# Patient Record
Sex: FEMALE | Race: WHITE | NOT HISPANIC OR LATINO | Employment: FULL TIME | URBAN - METROPOLITAN AREA
[De-identification: names, ages, dates, MRNs, and addresses within clinical notes are randomized per-mention and may not be internally consistent; named-entity substitution may affect disease eponyms.]

---

## 2017-02-24 ENCOUNTER — TRANSCRIBE ORDERS (OUTPATIENT)
Dept: ADMINISTRATIVE | Facility: HOSPITAL | Age: 22
End: 2017-02-24

## 2017-02-24 DIAGNOSIS — R10.11 ABDOMINAL PAIN, RIGHT UPPER QUADRANT: Primary | ICD-10-CM

## 2017-02-24 DIAGNOSIS — R10.12 ABDOMINAL PAIN, LEFT UPPER QUADRANT: ICD-10-CM

## 2017-02-27 ENCOUNTER — HOSPITAL ENCOUNTER (OUTPATIENT)
Dept: RADIOLOGY | Age: 22
Discharge: HOME | End: 2017-02-27
Payer: COMMERCIAL

## 2017-02-27 DIAGNOSIS — R10.12 ABDOMINAL PAIN, LEFT UPPER QUADRANT: ICD-10-CM

## 2017-02-27 DIAGNOSIS — R10.11 ABDOMINAL PAIN, RIGHT UPPER QUADRANT: ICD-10-CM

## 2017-02-27 PROCEDURE — 76700 US EXAM ABDOM COMPLETE: CPT

## 2017-02-27 PROCEDURE — 74000 HB X-RAY EXAM OF ABDOMEN (SINGLE ANTEROPOSTERIOR VIEW): CPT

## 2017-06-22 ENCOUNTER — TRANSCRIBE ORDERS (OUTPATIENT)
Dept: LAB | Facility: HOSPITAL | Age: 22
End: 2017-06-22

## 2017-06-22 ENCOUNTER — APPOINTMENT (OUTPATIENT)
Dept: LAB | Facility: HOSPITAL | Age: 22
End: 2017-06-22
Attending: INTERNAL MEDICINE
Payer: COMMERCIAL

## 2017-06-22 DIAGNOSIS — R10.12 ABDOMINAL PAIN, LEFT UPPER QUADRANT: ICD-10-CM

## 2017-06-22 DIAGNOSIS — K76.0 FATTY METAMORPHOSIS OF LIVER: ICD-10-CM

## 2017-06-22 DIAGNOSIS — R10.12 ABDOMINAL PAIN, LEFT UPPER QUADRANT: Primary | ICD-10-CM

## 2017-06-22 LAB
ALBUMIN SERPL BCP-MCNC: 3.7 G/DL (ref 3.5–5)
ALP SERPL-CCNC: 69 U/L (ref 46–116)
ALT SERPL W P-5'-P-CCNC: 108 U/L (ref 12–78)
AMYLASE SERPL-CCNC: 38 IU/L (ref 25–115)
ANION GAP SERPL CALCULATED.3IONS-SCNC: 10 MMOL/L (ref 4–13)
AST SERPL W P-5'-P-CCNC: 81 U/L (ref 5–45)
BASOPHILS # BLD AUTO: 0.02 THOUSANDS/ΜL (ref 0–0.1)
BASOPHILS NFR BLD AUTO: 0 % (ref 0–1)
BILIRUB SERPL-MCNC: 0.47 MG/DL (ref 0.2–1)
BUN SERPL-MCNC: 11 MG/DL (ref 5–25)
CALCIUM SERPL-MCNC: 9.2 MG/DL (ref 8.3–10.1)
CHLORIDE SERPL-SCNC: 103 MMOL/L (ref 100–108)
CHOLEST SERPL-MCNC: 214 MG/DL (ref 50–200)
CO2 SERPL-SCNC: 26 MMOL/L (ref 21–32)
CREAT SERPL-MCNC: 0.77 MG/DL (ref 0.6–1.3)
EOSINOPHIL # BLD AUTO: 0.17 THOUSAND/ΜL (ref 0–0.61)
EOSINOPHIL NFR BLD AUTO: 2 % (ref 0–6)
ERYTHROCYTE [DISTWIDTH] IN BLOOD BY AUTOMATED COUNT: 13.2 % (ref 11.6–15.1)
GFR SERPL CREATININE-BSD FRML MDRD: >60 ML/MIN/1.73SQ M
GLUCOSE P FAST SERPL-MCNC: 95 MG/DL (ref 65–99)
HCT VFR BLD AUTO: 39.8 % (ref 34.8–46.1)
HDLC SERPL-MCNC: 33 MG/DL (ref 40–60)
HGB BLD-MCNC: 13.6 G/DL (ref 11.5–15.4)
LDLC SERPL CALC-MCNC: 101 MG/DL (ref 0–100)
LIPASE SERPL-CCNC: 113 U/L (ref 73–393)
LYMPHOCYTES # BLD AUTO: 3.79 THOUSANDS/ΜL (ref 0.6–4.47)
LYMPHOCYTES NFR BLD AUTO: 52 % (ref 14–44)
MCH RBC QN AUTO: 30.8 PG (ref 26.8–34.3)
MCHC RBC AUTO-ENTMCNC: 34.2 G/DL (ref 31.4–37.4)
MCV RBC AUTO: 90 FL (ref 82–98)
MONOCYTES # BLD AUTO: 0.57 THOUSAND/ΜL (ref 0.17–1.22)
MONOCYTES NFR BLD AUTO: 8 % (ref 4–12)
NEUTROPHILS # BLD AUTO: 2.77 THOUSANDS/ΜL (ref 1.85–7.62)
NEUTS SEG NFR BLD AUTO: 38 % (ref 43–75)
NRBC BLD AUTO-RTO: 0 /100 WBCS
PLATELET # BLD AUTO: 290 THOUSANDS/UL (ref 149–390)
PMV BLD AUTO: 9 FL (ref 8.9–12.7)
POTASSIUM SERPL-SCNC: 3.8 MMOL/L (ref 3.5–5.3)
PROT SERPL-MCNC: 7.8 G/DL (ref 6.4–8.2)
RBC # BLD AUTO: 4.42 MILLION/UL (ref 3.81–5.12)
SODIUM SERPL-SCNC: 139 MMOL/L (ref 136–145)
TRIGL SERPL-MCNC: 400 MG/DL
WBC # BLD AUTO: 7.33 THOUSAND/UL (ref 4.31–10.16)

## 2017-06-22 PROCEDURE — 82150 ASSAY OF AMYLASE: CPT

## 2017-06-22 PROCEDURE — 80061 LIPID PANEL: CPT

## 2017-06-22 PROCEDURE — 83690 ASSAY OF LIPASE: CPT

## 2017-06-22 PROCEDURE — 36415 COLL VENOUS BLD VENIPUNCTURE: CPT

## 2017-06-22 PROCEDURE — 80053 COMPREHEN METABOLIC PANEL: CPT

## 2017-06-22 PROCEDURE — 85025 COMPLETE CBC W/AUTO DIFF WBC: CPT

## 2017-10-02 ENCOUNTER — ALLSCRIPTS OFFICE VISIT (OUTPATIENT)
Dept: OTHER | Facility: OTHER | Age: 22
End: 2017-10-02

## 2017-10-11 ENCOUNTER — ALLSCRIPTS OFFICE VISIT (OUTPATIENT)
Dept: OTHER | Facility: OTHER | Age: 22
End: 2017-10-11

## 2017-10-19 ENCOUNTER — GENERIC CONVERSION - ENCOUNTER (OUTPATIENT)
Dept: OTHER | Facility: OTHER | Age: 22
End: 2017-10-19

## 2017-10-30 ENCOUNTER — ALLSCRIPTS OFFICE VISIT (OUTPATIENT)
Dept: OTHER | Facility: OTHER | Age: 22
End: 2017-10-30

## 2017-11-02 ENCOUNTER — GENERIC CONVERSION - ENCOUNTER (OUTPATIENT)
Dept: OTHER | Facility: OTHER | Age: 22
End: 2017-11-02

## 2017-11-09 ENCOUNTER — GENERIC CONVERSION - ENCOUNTER (OUTPATIENT)
Dept: OTHER | Facility: OTHER | Age: 22
End: 2017-11-09

## 2017-11-16 ENCOUNTER — GENERIC CONVERSION - ENCOUNTER (OUTPATIENT)
Dept: OTHER | Facility: OTHER | Age: 22
End: 2017-11-16

## 2017-11-20 ENCOUNTER — GENERIC CONVERSION - ENCOUNTER (OUTPATIENT)
Dept: OTHER | Facility: OTHER | Age: 22
End: 2017-11-20

## 2017-12-07 ENCOUNTER — GENERIC CONVERSION - ENCOUNTER (OUTPATIENT)
Dept: OTHER | Facility: OTHER | Age: 22
End: 2017-12-07

## 2018-01-11 NOTE — PROGRESS NOTES
History of Present Illness  HPI: Fenton is here for f/u  Current wt: 139 7 lbs  Loss of 1 9 lbs x 2 1/2 wks  Tracking and consuming 800-1000 kcal/day  Feels she has more energy with incorporation of snacks  Questions addressed re: dining out, protein bars and upcoming Thanksgiving holiday  Bariatric MNT M[facility]:   Weight Assessment: IBW: 95 lbs, ABW: 106 2 lbs, Weight Change: 1 9 lb loss x 2 wks, Highest Weight: 156 lbs, Lowest Weight: 130 lbs, Goal Weight: 122 lbs  Weight loss attempts: Weight Watchers: lbs  Excess calories come from large portions at mealtimes  Dietary Recall:   Breakfast: oatmeal + skim milk OR egg whites (2-3), veggies, sometimes toast    Snack: cheese stick + fruit  Lunch: salad w/chicken OR ham & cheese on wheat bread  Snack: nuts  Dinner: ~3 oz lean protein, 1/2 cup carb, veggies  Snack: nonfat yogurt   Beverages: water, coffee w/cream & sugar  Estimated fluid intake: 67 oz  Alcohol consumption: rare  Food allergies/intolerances: n/a but must use low or fat free items due to difficulty absorbing fat  Cooking: self & mom  Shopping: self  She dines out 1-2 times per week  Exercise Frequency:  She exercises walking 20k steps/day  Nutrition Prescription: Estimated calories for weight loss: eCalc BMR 1286, wt loss w/o exericse 543-1043, Estimated daily protein needs: 53-65 gm (1 2-1 5 gm/kg IBW) and Estimated daily fluid needs: 50 oz (35 cc/kg IBW)  Nutrition Intervention: Counseling provided with emphasis on portion sizes, healthy snack choices, food journaling and dining out, protein bars  Education materials provided: [employer] manual and dining out, protein bars  Comprehension: good  Expected Compliance: good     Goals: follow meal plan prescribed, reduce portion sizes at mealtimes, plan meals and snacks daily, Choose lower-calorie, lower-fat meal options at home and when dining out, food journal, do not skip meals or snacks and 825-1000 calories  Monitor/Evaluation: weekly weight, food journal, fluid intake and exercise level  Active Problems    1  Fatty liver (571 8) (K76 0)   2  Obesity due to excess calories, unspecified obesity severity (278 00) (E66 09)   3  Overweight (BMI 25 0-29 9) (278 02) (E66 3)   4  Weight gain (783 1) (R63 5)    Past Medical History    1  History of muscle weakness (V13 59) (Z87 39)   2  History of renal calculi (V13 01) (C05 944)    Surgical History    1  History of Gallbladder Surgery   2  History of Oral Surgery Tooth Extraction    Family History  Mother    1  Family history of Unobtainable family history due to adoption  Father    2  Family history of Unobtainable family history due to adoption    Social History    · Consumes alcohol occasionally (V49 89) (Z78 9)   · Never a smoker   · No illicit drug use    Current Meds   1  Junel FE 1 5/30 1 5-30 MG-MCG Oral Tablet; Therapy: (Recorded:97Tik2831) to Recorded   2  [facility] Colon Health Oral Capsule; Therapy: (Recorded:80Hca8467) to Recorded   3  Vitamin D 1000 UNIT Oral Tablet; Therapy: (Recorded:34Vhj4381) to Recorded    Allergies    1  No Known Drug Allergies    Vitals  Signs   Recorded: 34JSE2691 09:21AM   Height: 4 ft 10 5 in  Weight: 139 lb 11 2 oz  BMI Calculated: 28 7  BSA Calculated: 1 57    Future Appointments    Date/Time Provider Specialty Site   01/16/2018 08:00 AM MAYRA Tovar  Bariatric Medicine [facility]     Signatures   Electronically signed by : Joey White, ; Oct 30 2017  9:33AM EST                       (Author)    Electronically signed by :  Joey White, ; Oct 30 2017  9:50AM EST                       (Author)    Electronically signed by : MAYRA Soria ; Nov 6 2017 12:29PM EST                       (Co-author)

## 2018-01-13 VITALS — HEIGHT: 59 IN | BODY MASS INDEX: 27.94 KG/M2 | WEIGHT: 138.6 LBS

## 2018-01-15 VITALS — HEIGHT: 59 IN | BODY MASS INDEX: 28.16 KG/M2 | WEIGHT: 139.7 LBS

## 2018-01-16 ENCOUNTER — GENERIC CONVERSION - ENCOUNTER (OUTPATIENT)
Dept: OTHER | Facility: OTHER | Age: 23
End: 2018-01-16

## 2018-01-17 NOTE — CONSULTS
Chief Complaint  Chief Complaint Free Text Note Form: New patient here for MWM consult; Stop Bang 1/8  History of Present Illness  Free Text HPI: Excess weight:  Severity: mild  Onset: 2-3 years  Modifiers: college-gained 20+lbs, eating healthier, less fried food  goes to gym 3-4 times per week, recently lost 15 pounds with diet and exercise  Assoc conditions: abd pain, fatty liver    B-yogurt and fruit  S-Ensure  L-salad-no fat ranch and some cheese and veggies or sandwich-lunchmeat on wheat  D-soups- w/ bread  S-little bit of ice cream  Drinks: water-2-3 large bottles of water, coffee 2-3 cups w/ no fat creamer or milk and a bit of sugar  Dines out: 1-2 times per week    Exercise: 2-3 times per week 45-60 min  Past Medical History    1  History of muscle weakness (V13 59) (Z87 39)   2  History of renal calculi (V13 01) (N26 206)  Active Problems And Past Medical History Reviewed: The active problems and past medical history were reviewed and updated today  Assessment    1  Weight gain (783 1) (R63 5)   2  Fatty liver (571 8) (K76 0)   3  Overweight (BMI 25 0-29 9) (278 02) (E66 3)    Discussion/Summary  Discussion Summary:     20-year-old female with fatty liver disease and excess weight here to pursue medical weight management to improve weight and health  Excess weight/weight gain/overweight:  -discussed options of conservative vs LARRY program +/- meal replacement vs VLCD  -discuss the role of weight loss medications  -initial focus of 5-10% weight loss over 3-6 mos for improved health  -screening labs-request    Fatty liver:  -should improve with 5-10 percent weight loss  -ALT-108, AST-81    Patient wishes to start healthy core intensive left-sided mention program  We will set up a visit with dietitian and I will see her at program end in about 12-16 weeks  Patient's Capacity to Self-Care: Patient is able to Self-Care  Understands and agrees with treatment plan:  The treatment plan was reviewed with the patient/guardian  The patient/guardian understands and agrees with the treatment plan   Self Referrals:   Self Referrals: No      Review of Systems  Focused-Female:   Constitutional: no fever  ENT: no sore throat  Cardiovascular: no chest pain and no palpitations  Respiratory: no shortness of breath and no wheezing  Gastrointestinal: constipation and +heartburn, but no abdominal pain  Genitourinary: no dysuria  Musculoskeletal: arthralgias  Integumentary: no rashes  Neurological: no headache  Other Symptoms: Psych:denies depression/anxiety  Active Problems    1  Fatty liver (571 8) (K76 0)   2  Obesity due to excess calories, unspecified obesity severity (278 00) (E66 09)    Surgical History    1  History of Gallbladder Surgery   2  History of Oral Surgery Tooth Extraction  Surgical History Reviewed: The surgical history was reviewed and updated today  Family History  Mother    1  Family history of Unobtainable family history due to adoption  Father    2  Family history of Unobtainable family history due to adoption  Family History Reviewed: The family history was reviewed and updated today  Social History    · Consumes alcohol occasionally (V49 89) (Z78 9)   · Never a smoker   · No illicit drug use  Social History Reviewed: The social history was reviewed and updated today  Current Meds   1  Junel FE 1 5/30 1 5-30 MG-MCG Oral Tablet; Therapy: (Recorded:46Hgm5703) to Recorded   2  [facility] Colon Health Oral Capsule; Therapy: (Recorded:80Oec6752) to Recorded   3  Vitamin D 1000 UNIT Oral Tablet; Therapy: (031 [id number] 63 15) to Recorded  Medication List Reviewed: The medication list was reviewed and updated today  Allergies    1   No Known Drug Allergies    Vitals  Vital Signs    Recorded: 29JRJ2212 02:07PM   Temperature 99 F   Heart Rate 104   Respiration 15   Systolic 924   Diastolic 68   Height 4 ft 10 5 in   Weight 143 lb 6 4 oz   BMI Calculated 29 46   BSA Calculated 1 59   Waist Circumference 36   Neck Circumference 15     Physical Exam    Constitutional   General appearance: Abnormal   well developed and overweight  Eyes No conjunctival pallor  Ears, Nose, Mouth, and Throat Moist oral mucosa  Pulmonary   Respiratory effort: No increased work of breathing or signs of respiratory distress  Auscultation of lungs: Clear to auscultation  Cardiovascular   Auscultation of heart: Normal rate and rhythm, normal S1 and S2, without murmurs  Abdomen   Abdomen: Abnormal   The abdomen was obese  The abdomen was soft and nontender     Musculoskeletal   Gait and station: Normal     Psychiatric   Orientation to person, place, and time: Normal          Results/Data  STOP BANG Questionnaire 02Oct2017 02:14PM User, s     Test Name Result Flag Reference   STOP BANG Questionnaire Risk of ALLISON Low Risk     Snoring: Yes  Tired: No  Observed: No  Blood Pressure: No  BMI: No  Age: No  Neck Circumference: No  Gender: No   STOP BANG Questionnaire ALLISON Total Score 1     Snoring: Yes  Tired: No  Observed: No  Blood Pressure: No  BMI: No  Age: No  Neck Circumference: No  Gender: No       Signatures   Electronically signed by : MAYRA Martini ; Oct  2 2017  2:59PM EST                       (Author)    Electronically signed by : MAYRA Martini ; Oct  2 2017  3:00PM EST                       (Author)

## 2018-01-18 NOTE — PROGRESS NOTES
History of Present Illness  HPI: Jennifer De Leon is here for initial RD session for Healthy Core  Current wt 141 6 lbs  Wanting to lose weight to a healthier range due to fatty liver  She has difficulty absorbing fat and does choose all low fat or fat free items  Currently with no routine exercise however with her job she is very mobile, walking about 20k steps per day  Based on food recall she appears to have long stretches between meals and lacking in protein  She did not wish to use replacements at this time but samples were provided  Bariatric MNT [facility]:   Weight Assessment: IBW: 95 lbs, ABW: 106 6 lbs, Highest Weight: 156 lbs, Lowest Weight: 130 lbs, Goal Weight: 122 lbs  Weight loss attempts: Weight Watchers: lbs  Excess calories come from large portions at mealtimes  Dietary Recall:   Breakfast: wake 6:30-7  eat 7:30: Nonfat Yoplait yogurt (90, 5) + fruit (strawberries, melon)  Snack: skip  Lunch: 12-1: salad w/nonfat ranch, veggies, ~3 oz chicken, sometimes cheese  Snack: skip  Dinner: 6:00: 3 oz lean protein, veggies, 1/2 cup carb  Snack: skip or yogurt or freeze pop  bed at 11   Beverages: water, coffee w/skim&sugar  Estimated fluid intake: 80 oz  Alcohol consumption: rare  Food allergies/intolerances: n/a  Cooking: self & mom  Shopping: self  She dines out 1-2 times per week  Exercise Frequency:  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)  Nutrition Prescription: Estimated calories for weight loss: Hand 1297x1  4-2250=514-7167, [place] BMR 1295x1  4-5126=161-9177, Estimated daily protein needs: 53-65 gm (1 2-1 5 gm/kg IBW) and Estimated daily fluid needs: 50 oz (35 cc/kg IBW)  Breakfast: 150-200, 10-15: 3/4 cup high pro cereal + 4 oz skim OR yoplait nonfat, fruit, 2 egg white OR yopalit, fruit, 2 tbsp pb powder  Snack: food snack  Lunch: 220-280: salad w/2 TBSP FF dressing, 1-2 tbsp cheese, 3 oz chicken, fruit  Snack: food snack     Dinner: 522-262, 21: 3 oz lean pro, 1/3 -1/2 cup carb, veggies, 1 fat  Snack: skip or food snack   Nutrition Intervention: Counseling provided with emphasis on meal planning, portion sizes, healthy snack choices, hydration, fiber intake, protein intake, exercise and food journaling  Education materials provided: [employer] manual and calorie controlled menus  Comprehension: good  Expected Compliance: good  Goals: follow meal plan prescribed, reduce portion sizes at mealtimes, plan meals and snacks daily, Choose lower-calorie, lower-fat meal options at home and when dining out, food journal, do not skip meals or snacks and 825-1210 calories  Monitor/Evaluation: weekly weight, food journal, fluid intake and exercise level  Active Problems    1  Fatty liver (571 8) (K76 0)   2  Obesity due to excess calories, unspecified obesity severity (278 00) (E66 09)   3  Overweight (BMI 25 0-29 9) (278 02) (E66 3)   4  Weight gain (783 1) (R63 5)    Past Medical History    1  History of muscle weakness (V13 59) (Z87 39)   2  History of renal calculi (V13 01) (V47 392)    Surgical History    1  History of Gallbladder Surgery   2  History of Oral Surgery Tooth Extraction    Family History  Mother    1  Family history of Unobtainable family history due to adoption  Father    2  Family history of Unobtainable family history due to adoption    Social History    · Consumes alcohol occasionally (V49 89) (Z78 9)   · Never a smoker   · No illicit drug use    Current Meds   1  Junel FE 1 5/30 1 5-30 MG-MCG Oral Tablet; Therapy: (Recorded:91Hen0348) to Recorded   2  [facility] Colon Health Oral Capsule; Therapy: (Recorded:02Oct2017) to Recorded   3  Vitamin D 1000 UNIT Oral Tablet; Therapy: (Recorded:02Oct2017) to Recorded    Allergies    1   No Known Drug Allergies    Vitals  Signs   Recorded: 32FDR2653 08:55AM   Height: 4 ft 10 5 in  Weight: 141 lb 9 6 oz  BMI Calculated: 29 09  BSA Calculated: 1 58    Results/Data  [facility] Flowsheet [id number] 08:56AM Perfecto Craze     Test Name Result Flag Reference   Height 58 5     Weight 141 6     Body Fat % 37 3     Fat Mass 52 8     FFM ( Fat Free Mass) 88 8     Muscle Mass 84 2     TBW ( Total Body Water) 61 0     TBW % 43 1     Bone Mass 4 6     BMR ( Basal Metabolic Rate) 0673     Metabolic Age 43     Visceral Fat Rating 5     BMI 29 1         Future Appointments    Date/Time Provider Specialty Site   10/30/2017 09:00 AM Kasi Omalley  [facility]   01/16/2018 08:00 AM MAYRA Holt  Bariatric Medicine [facility]     Signatures   Electronically signed by :  Brendon Phillips, ; Oct 11 2017  8:55AM EST                       (Author)    Electronically signed by : MAYRA Desir ; Oct 11 2017  9:52AM EST                       (Co-author)

## 2018-01-22 VITALS — WEIGHT: 140.4 LBS | HEIGHT: 59 IN | BODY MASS INDEX: 28.3 KG/M2

## 2018-01-22 VITALS
TEMPERATURE: 99 F | HEIGHT: 59 IN | BODY MASS INDEX: 28.91 KG/M2 | DIASTOLIC BLOOD PRESSURE: 68 MMHG | SYSTOLIC BLOOD PRESSURE: 114 MMHG | HEART RATE: 104 BPM | WEIGHT: 143.4 LBS | RESPIRATION RATE: 15 BRPM

## 2018-01-22 VITALS — HEIGHT: 59 IN | WEIGHT: 141.6 LBS | BODY MASS INDEX: 28.55 KG/M2

## 2018-01-22 VITALS — WEIGHT: 137 LBS | BODY MASS INDEX: 27.62 KG/M2 | HEIGHT: 59 IN

## 2018-01-22 VITALS — HEIGHT: 59 IN | BODY MASS INDEX: 27.58 KG/M2 | WEIGHT: 136.8 LBS

## 2018-01-22 VITALS — WEIGHT: 137.8 LBS | BODY MASS INDEX: 27.78 KG/M2 | HEIGHT: 59 IN

## 2018-01-22 VITALS — BODY MASS INDEX: 27.78 KG/M2 | WEIGHT: 137.8 LBS | HEIGHT: 59 IN

## 2018-01-23 NOTE — MISCELLANEOUS
Provider Comments  Provider Comments:   Dear Susan Villanueva,    We called you about your scheduled appointment for today but were unable to reach you  It is very important that you follow up with us so that we can assess your physical and nutritional safety  Please call our office at [phone] to reschedule your appointment       Sincerely,     Ji Baker [facility]          Signatures   Electronically signed by : Tacho Mercado, ; Jan 16 2018  8:34AM EST                       (Author)

## 2018-02-19 ENCOUNTER — HOSPITAL ENCOUNTER (EMERGENCY)
Facility: HOSPITAL | Age: 23
Discharge: HOME | End: 2018-02-19
Attending: EMERGENCY MEDICINE | Admitting: EMERGENCY MEDICINE
Payer: COMMERCIAL

## 2018-02-19 ENCOUNTER — APPOINTMENT (EMERGENCY)
Dept: RADIOLOGY | Facility: HOSPITAL | Age: 23
End: 2018-02-19
Payer: COMMERCIAL

## 2018-02-19 VITALS
WEIGHT: 130 LBS | BODY MASS INDEX: 26.21 KG/M2 | HEART RATE: 79 BPM | OXYGEN SATURATION: 97 % | HEIGHT: 59 IN | RESPIRATION RATE: 18 BRPM | SYSTOLIC BLOOD PRESSURE: 123 MMHG | TEMPERATURE: 98 F | DIASTOLIC BLOOD PRESSURE: 75 MMHG

## 2018-02-19 DIAGNOSIS — R10.9 ABDOMINAL PAIN: Primary | ICD-10-CM

## 2018-02-19 DIAGNOSIS — N39.0 UTI (URINARY TRACT INFECTION): ICD-10-CM

## 2018-02-19 LAB
ALBUMIN SERPL BCP-MCNC: 3.7 G/DL (ref 3.5–5)
ALP SERPL-CCNC: 76 U/L (ref 46–116)
ALT SERPL W P-5'-P-CCNC: 81 U/L (ref 12–78)
ANION GAP SERPL CALCULATED.3IONS-SCNC: 8 MMOL/L (ref 4–13)
AST SERPL W P-5'-P-CCNC: 35 U/L (ref 5–45)
BACTERIA UR QL AUTO: ABNORMAL /HPF
BASOPHILS # BLD AUTO: 0.03 THOUSANDS/ΜL (ref 0–0.1)
BASOPHILS NFR BLD AUTO: 0 % (ref 0–1)
BILIRUB SERPL-MCNC: 0.31 MG/DL (ref 0.2–1)
BILIRUB UR QL STRIP: NEGATIVE
BUN SERPL-MCNC: 7 MG/DL (ref 5–25)
CALCIUM SERPL-MCNC: 8.6 MG/DL (ref 8.3–10.1)
CHLORIDE SERPL-SCNC: 108 MMOL/L (ref 100–108)
CLARITY UR: CLEAR
CO2 SERPL-SCNC: 24 MMOL/L (ref 21–32)
COLOR UR: YELLOW
COLOR, POC: NORMAL
CREAT SERPL-MCNC: 0.7 MG/DL (ref 0.6–1.3)
EOSINOPHIL # BLD AUTO: 0.07 THOUSAND/ΜL (ref 0–0.61)
EOSINOPHIL NFR BLD AUTO: 1 % (ref 0–6)
ERYTHROCYTE [DISTWIDTH] IN BLOOD BY AUTOMATED COUNT: 12.7 % (ref 11.6–15.1)
EXT PREG TEST URINE: NEGATIVE
GFR SERPL CREATININE-BSD FRML MDRD: 122 ML/MIN/1.73SQ M
GLUCOSE SERPL-MCNC: 95 MG/DL (ref 65–140)
GLUCOSE UR STRIP-MCNC: NEGATIVE MG/DL
HCT VFR BLD AUTO: 37.3 % (ref 34.8–46.1)
HGB BLD-MCNC: 13.1 G/DL (ref 11.5–15.4)
HGB UR QL STRIP.AUTO: ABNORMAL
HYALINE CASTS #/AREA URNS LPF: ABNORMAL /LPF
KETONES UR STRIP-MCNC: NEGATIVE MG/DL
LEUKOCYTE ESTERASE UR QL STRIP: NEGATIVE
LIPASE SERPL-CCNC: 138 U/L (ref 73–393)
LYMPHOCYTES # BLD AUTO: 4.87 THOUSANDS/ΜL (ref 0.6–4.47)
LYMPHOCYTES NFR BLD AUTO: 58 % (ref 14–44)
MCH RBC QN AUTO: 30.4 PG (ref 26.8–34.3)
MCHC RBC AUTO-ENTMCNC: 35.1 G/DL (ref 31.4–37.4)
MCV RBC AUTO: 87 FL (ref 82–98)
MONOCYTES # BLD AUTO: 0.45 THOUSAND/ΜL (ref 0.17–1.22)
MONOCYTES NFR BLD AUTO: 5 % (ref 4–12)
NEUTROPHILS # BLD AUTO: 2.99 THOUSANDS/ΜL (ref 1.85–7.62)
NEUTS SEG NFR BLD AUTO: 36 % (ref 43–75)
NITRITE UR QL STRIP: NEGATIVE
NON-SQ EPI CELLS URNS QL MICRO: ABNORMAL /HPF
NRBC BLD AUTO-RTO: 0 /100 WBCS
PH UR STRIP.AUTO: 5.5 [PH] (ref 4.5–8)
PLATELET # BLD AUTO: 309 THOUSANDS/UL (ref 149–390)
PMV BLD AUTO: 8.9 FL (ref 8.9–12.7)
POTASSIUM SERPL-SCNC: 3.8 MMOL/L (ref 3.5–5.3)
PROT SERPL-MCNC: 7.6 G/DL (ref 6.4–8.2)
PROT UR STRIP-MCNC: NEGATIVE MG/DL
RBC # BLD AUTO: 4.31 MILLION/UL (ref 3.81–5.12)
RBC #/AREA URNS AUTO: ABNORMAL /HPF
SODIUM SERPL-SCNC: 140 MMOL/L (ref 136–145)
SP GR UR STRIP.AUTO: 1.02 (ref 1–1.03)
UROBILINOGEN UR QL STRIP.AUTO: 0.2 E.U./DL
WBC # BLD AUTO: 8.43 THOUSAND/UL (ref 4.31–10.16)
WBC #/AREA URNS AUTO: ABNORMAL /HPF

## 2018-02-19 PROCEDURE — 36415 COLL VENOUS BLD VENIPUNCTURE: CPT | Performed by: EMERGENCY MEDICINE

## 2018-02-19 PROCEDURE — 81002 URINALYSIS NONAUTO W/O SCOPE: CPT | Performed by: EMERGENCY MEDICINE

## 2018-02-19 PROCEDURE — 80053 COMPREHEN METABOLIC PANEL: CPT | Performed by: EMERGENCY MEDICINE

## 2018-02-19 PROCEDURE — 99284 EMERGENCY DEPT VISIT MOD MDM: CPT

## 2018-02-19 PROCEDURE — 85025 COMPLETE CBC W/AUTO DIFF WBC: CPT | Performed by: EMERGENCY MEDICINE

## 2018-02-19 PROCEDURE — 74177 CT ABD & PELVIS W/CONTRAST: CPT

## 2018-02-19 PROCEDURE — 96374 THER/PROPH/DIAG INJ IV PUSH: CPT

## 2018-02-19 PROCEDURE — 83690 ASSAY OF LIPASE: CPT | Performed by: EMERGENCY MEDICINE

## 2018-02-19 PROCEDURE — 81001 URINALYSIS AUTO W/SCOPE: CPT

## 2018-02-19 PROCEDURE — 81025 URINE PREGNANCY TEST: CPT | Performed by: EMERGENCY MEDICINE

## 2018-02-19 PROCEDURE — 96361 HYDRATE IV INFUSION ADD-ON: CPT

## 2018-02-19 RX ORDER — NORETHINDRONE ACETATE AND ETHINYL ESTRADIOL 1.5-30(21)
1 KIT ORAL DAILY
COMMUNITY

## 2018-02-19 RX ORDER — DICYCLOMINE HCL 20 MG
20 TABLET ORAL 2 TIMES DAILY
Qty: 20 TABLET | Refills: 0 | Status: SHIPPED | OUTPATIENT
Start: 2018-02-19

## 2018-02-19 RX ORDER — CEPHALEXIN 250 MG/1
500 CAPSULE ORAL EVERY 12 HOURS SCHEDULED
Qty: 12 CAPSULE | Refills: 0 | Status: SHIPPED | OUTPATIENT
Start: 2018-02-19 | End: 2018-02-22

## 2018-02-19 RX ORDER — CEPHALEXIN 250 MG/1
500 CAPSULE ORAL ONCE
Status: COMPLETED | OUTPATIENT
Start: 2018-02-19 | End: 2018-02-19

## 2018-02-19 RX ORDER — KETOROLAC TROMETHAMINE 30 MG/ML
15 INJECTION, SOLUTION INTRAMUSCULAR; INTRAVENOUS ONCE
Status: COMPLETED | OUTPATIENT
Start: 2018-02-19 | End: 2018-02-19

## 2018-02-19 RX ADMIN — KETOROLAC TROMETHAMINE 15 MG: 30 INJECTION, SOLUTION INTRAMUSCULAR at 07:52

## 2018-02-19 RX ADMIN — CEPHALEXIN 500 MG: 250 CAPSULE ORAL at 10:27

## 2018-02-19 RX ADMIN — SODIUM CHLORIDE 1000 ML: 0.9 INJECTION, SOLUTION INTRAVENOUS at 07:47

## 2018-02-19 RX ADMIN — IOHEXOL 100 ML: 350 INJECTION, SOLUTION INTRAVENOUS at 09:26

## 2018-02-19 NOTE — DISCHARGE INSTRUCTIONS

## 2018-02-19 NOTE — ED ATTENDING ATTESTATION
Ibrahima Rodriguez MD, saw and evaluated the patient  I have discussed the patient with the resident/non-physician practitioner and agree with the resident's/non-physician practitioner's findings, Plan of Care, and MDM as documented in the resident's/non-physician practitioner's note, except where noted  All available labs and Radiology studies were reviewed  At this point I agree with the current assessment done in the Emergency Department  I have conducted an independent evaluation of this patient a history and physical is as follows:      Critical Care Time  CritCare Time  OA: 22 y/o f with h/o IBS and previous renal stone who presents with abdominal pain since Tuesday  Pain is sharp and stabbing and radiates from umbilical region to RLQ  + nausea/vomiting  + loose, non-bloody stools and now with no BM since Friday  Decreased PO intake  No urinary sxms  No vaginal dc  No fevers/chills  Previous cholecystectomy, no other surgeries  PE, well developed f in NAD, VSS, NC/AT, MMM, anicteric sclera/conjunctiva, neck supple/FROM, RR, lungs CTAB, abd soft, +ttp over RUQ/LLQ with voluntary guarding, - LE edema/calf ttp, + distal pulses and capillary refill < 2 sec, AAOx3   A/p abd pain with h/o IBS, labs, u/a, urine preg, imaging, sxm control and re-eval    Procedures

## 2018-02-19 NOTE — ED PROVIDER NOTES
History  Chief Complaint   Patient presents with    Abdominal Pain     Pt c/o NVD tuesday, worsening abdominal pain RLQ to umbilicus       History provided by:  Patient   used: No    Abdominal Pain   Pain location:  Periumbilical and RLQ  Pain radiates to:  RLQ  Pain severity:  Mild  Duration:  2 days  Timing:  Constant  Progression:  Worsening  Chronicity:  New  Relieved by:  Nothing  Worsened by:  Eating  Ineffective treatments:  None tried  Associated symptoms: diarrhea, nausea and vomiting    Associated symptoms: no chills, no constipation, no dysuria, no fatigue, no fever, no hematuria, no vaginal bleeding and no vaginal discharge    Risk factors: multiple surgeries and obesity    Risk factors: no alcohol abuse, no aspirin use, not elderly, no NSAID use, not pregnant and no recent hospitalization        Prior to Admission Medications   Prescriptions Last Dose Informant Patient Reported? Taking?   norethindrone-ethinyl estradiol-iron (Cristina Nicki FE 1 5/30) 1 5-30 MG-MCG tablet   Yes No   Sig: Take 1 tablet by mouth daily      Facility-Administered Medications: None       Past Medical History:   Diagnosis Date    IBS (irritable bowel syndrome)     Kidney stones        Past Surgical History:   Procedure Laterality Date    CHOLECYSTECTOMY         History reviewed  No pertinent family history  I have reviewed and agree with the history as documented  Social History   Substance Use Topics    Smoking status: Never Smoker    Smokeless tobacco: Never Used    Alcohol use Yes      Comment: very little        Review of Systems   Constitutional: Negative  Negative for appetite change, chills, diaphoresis, fatigue and fever  HENT: Negative  Eyes: Negative  Respiratory: Negative  Cardiovascular: Negative  Gastrointestinal: Positive for abdominal pain, diarrhea, nausea and vomiting  Negative for blood in stool and constipation  Endocrine: Negative      Genitourinary: Negative for decreased urine volume, difficulty urinating, dyspareunia, dysuria, flank pain, frequency, hematuria, pelvic pain, urgency, vaginal bleeding, vaginal discharge and vaginal pain  Musculoskeletal: Negative  Skin: Negative  Allergic/Immunologic: Negative  Neurological: Negative  Psychiatric/Behavioral: Negative  All other systems reviewed and are negative  Physical Exam  ED Triage Vitals [02/19/18 0715]   Temperature Pulse Respirations Blood Pressure SpO2   98 °F (36 7 °C) 88 16 131/89 98 %      Temp Source Heart Rate Source Patient Position - Orthostatic VS BP Location FiO2 (%)   Oral Monitor Lying Right arm --      Pain Score       4           Orthostatic Vital Signs  Vitals:    02/19/18 0815 02/19/18 0838 02/19/18 0900 02/19/18 1040   BP:  117/75 113/74 123/75   Pulse: 88 80 82 79   Patient Position - Orthostatic VS:    Standing       Physical Exam   Constitutional: She is oriented to person, place, and time  She appears well-developed and well-nourished  HENT:   Head: Normocephalic and atraumatic  Right Ear: External ear normal    Left Ear: External ear normal    Nose: Nose normal    Mouth/Throat: Oropharynx is clear and moist    Eyes: Conjunctivae and EOM are normal  Pupils are equal, round, and reactive to light  Neck: Normal range of motion  Neck supple  No JVD present  No tracheal deviation present  No thyromegaly present  Cardiovascular: Normal rate, regular rhythm, normal heart sounds and intact distal pulses  Exam reveals no gallop and no friction rub  No murmur heard  Pulmonary/Chest: Effort normal and breath sounds normal  No stridor  No respiratory distress  She has no wheezes  She has no rales  She exhibits no tenderness  Abdominal: Soft  Bowel sounds are normal  She exhibits no distension and no mass  There is tenderness in the right lower quadrant, epigastric area, suprapubic area and left lower quadrant   There is no rigidity, no rebound, no guarding and no CVA tenderness  No hernia  Musculoskeletal: Normal range of motion  She exhibits no edema, tenderness or deformity  Lymphadenopathy:     She has no cervical adenopathy  Neurological: She is alert and oriented to person, place, and time  She has normal reflexes  She displays normal reflexes  No cranial nerve deficit  She exhibits normal muscle tone  Coordination normal    Skin: Skin is warm  No rash noted  No erythema  No pallor  Psychiatric: She has a normal mood and affect  Her behavior is normal  Judgment and thought content normal    Nursing note and vitals reviewed        ED Medications  Medications   sodium chloride 0 9 % bolus 1,000 mL (0 mL Intravenous Stopped 2/19/18 1028)   ketorolac (TORADOL) injection 15 mg (15 mg Intravenous Given 2/19/18 0752)   iohexol (OMNIPAQUE) 350 MG/ML injection (MULTI-DOSE) 100 mL (100 mL Intravenous Given 2/19/18 0926)   cephalexin (KEFLEX) capsule 500 mg (500 mg Oral Given 2/19/18 1027)       Diagnostic Studies  Results Reviewed     Procedure Component Value Units Date/Time    Urine Microscopic [[id number]]  (Abnormal) Collected:  02/19/18 0841    Lab Status:  Final result Specimen:  Urine from Urine, Clean Catch Updated:  02/19/18 0903     RBC, UA 2-4 (A) /hpf      WBC, UA 2-4 (A) /hpf      Epithelial Cells None Seen /hpf      Bacteria, UA Occasional /hpf      Hyaline Casts, UA None Seen /lpf     POCT urinalysis dipstick [[id number]]  (Normal) Resulted:  02/19/18 0835    Lab Status:  Final result Specimen:  Urine Updated:  02/19/18 0859     Color, UA -    POCT pregnancy, urine [[id number]]  (Normal) Resulted:  02/19/18 0835    Lab Status:  Final result Updated:  02/19/18 0859     EXT PREG TEST UR (Ref: Negative) NEGATIVE    ED Urine Macroscopic [[id number]]  (Abnormal) Collected:  02/19/18 0841    Lab Status:  Final result Specimen:  Urine Updated:  02/19/18 0835     Color, UA Yellow     Clarity, UA Clear     pH, UA 5 5     Leukocytes, UA Negative     Nitrite, UA Negative Protein, UA Negative mg/dl      Glucose, UA Negative mg/dl      Ketones, UA Negative mg/dl      Urobilinogen, UA 0 2 E U /dl      Bilirubin, UA Negative     Blood, UA Trace (A)     Specific [place], UA 1 020    Narrative:       CLINITEK RESULT    Comprehensive metabolic panel [[id number]]  (Abnormal) Collected:  02/19/18 0752    Lab Status:  Final result Specimen:  Blood from Arm, Right Updated:  02/19/18 1275     Sodium 140 mmol/L      Potassium 3 8 mmol/L      Chloride 108 mmol/L      CO2 24 mmol/L      Anion Gap 8 mmol/L      BUN 7 mg/dL      Creatinine 0 70 mg/dL      Glucose 95 mg/dL      Calcium 8 6 mg/dL      AST 35 U/L      ALT 81 (H) U/L      Alkaline Phosphatase 76 U/L      Total Protein 7 6 g/dL      Albumin 3 7 g/dL      Total Bilirubin 0 31 mg/dL      eGFR 122 ml/min/1 73sq m     Narrative:         National Kidney Disease Education Program recommendations are as follows:  GFR calculation is accurate only with a steady state creatinine  Chronic Kidney disease less than 60 ml/min/1 73 sq  meters  Kidney failure less than 15 ml/min/1 73 sq  meters      Lipase [[id number]]  (Normal) Collected:  02/19/18 0752    Lab Status:  Final result Specimen:  Blood from Arm, Right Updated:  02/19/18 0819     Lipase 138 u/L     CBC and differential [[id number]]  (Abnormal) Collected:  02/19/18 0752    Lab Status:  Final result Specimen:  Blood from Arm, Right Updated:  02/19/18 0759     WBC 8 43 Thousand/uL      RBC 4 31 Million/uL      Hemoglobin 13 1 g/dL      Hematocrit 37 3 %      MCV 87 fL      MCH 30 4 pg      MCHC 35 1 g/dL      RDW 12 7 %      MPV 8 9 fL      Platelets 524 Thousands/uL      nRBC 0 /100 WBCs      Neutrophils Relative 36 (L) %      Lymphocytes Relative 58 (H) %      Monocytes Relative 5 %      Eosinophils Relative 1 %      Basophils Relative 0 %      Neutrophils Absolute 2 99 Thousands/µL      Lymphocytes Absolute 4 87 (H) Thousands/µL      Monocytes Absolute 0 45 Thousand/µL      Eosinophils Absolute 0 07 Thousand/µL      Basophils Absolute 0 03 Thousands/µL                  CT abdomen pelvis with contrast   Final Result by Antonio Wright MD (02/19 3980)      No acute intra-abdominal abnormality  No free air or free fluid  Normal appendix visualized  Fatty infiltration of the liver  Workstation performed: [id number]               Procedures  Procedures      Phone Consults  ED Phone Contact    ED Course  ED Course as of Feb 20 0858   Mon Feb 19, 2018   0908 Bacteria, UA: Occasional   9093 Will give Keflex       7043 On re-evaluation Patient states her pain is improved  No tenderness in the lower abdominal quadrants is noted now  Will p o  Challenge and re-evaluate/     1024  Patient tolerated [place] [employer] crackers  Continues to have benign exam   Will discharge at this time                                MDM  Number of Diagnoses or Management Options  Abdominal pain: new and requires workup  UTI (urinary tract infection): new and requires workup  Diagnosis management comments: Assessment:  -Abdominal pain  -TTP RLQ, LLQ, Epigastric   -Hx IBS  -Hx of cholecystectomy  -hx of fatty liver disease  Plan:   -Differential includes    -appendicitis is high on differential given pain in the epigastrium radiating to the RLQ       -diverticulitis, also possible given LLQ pain to palpation   -cholecystitis, although this is unlikely given normal LFTs and no right upper quadrant pain, additionally hx of cholecystectomy    -pancreatitis is a possibility given epigastric tenderness but unlikely given normal lipase   -cystitis/pyelonephritis, however unlikely given normal urinalysis   -ectopic pregnancy, unlikely given negative beta-hCG   -less likely ovarian cyst or torsion  -will obtain CBC throughout leukocytosis and anemia  -will obtain CMP to rule out electrolyte abnormality, to assess renal function, to assess for hepatobiliary dysfunction  -urinalysis to assess for UTI to assess for pregnancy status  -will treat patient's symptoms with Zofran, IV or p o  pain medication  -Imaging in the form of ultrasound or CT scan       Amount and/or Complexity of Data Reviewed  Clinical lab tests: ordered and reviewed  Tests in the radiology section of CPT®: ordered and reviewed  Tests in the medicine section of CPT®: reviewed and ordered  Decide to obtain previous medical records or to obtain history from someone other than the patient: yes  Independent visualization of images, tracings, or specimens: yes    Patient Progress  Patient progress: stable    CritCare Time    Disposition  Final diagnoses:   Abdominal pain   UTI (urinary tract infection)     Time reflects when diagnosis was documented in both MDM as applicable and the Disposition within this note     Time User Action Codes Description Comment    2/19/2018 10:25 AM Carron Begun Add [R10 9] Abdominal pain     2/19/2018 10:25 AM Elliot Garnett Add [N39 0] UTI (urinary tract infection)       ED Disposition     ED Disposition Condition Comment    Discharge  BERTHA Walker discharge to home/self care  Condition at discharge: Good        Follow-up Information     Follow up With Specialties Details Why Senia Hamilton MD Pediatrics Schedule an appointment as soon as possible for a visit  [address]  [address] [place] [address]          Discharge Medication List as of 2/19/2018 10:32 AM      START taking these medications    Details   cephalexin (KEFLEX) 250 mg capsule Take 2 capsules (500 mg total) by mouth every 12 (twelve) hours for 3 days, Starting Mon 2/19/2018, Until Thu 2/22/2018, Print      dicyclomine (BENTYL) 20 mg tablet Take 1 tablet (20 mg total) by mouth 2 (two) times a day, Starting Mon 2/19/2018, Print           No discharge procedures on file  ED Provider  Attending physically available and evaluated BERTHA Mendenhall 99  I managed the patient along with the ED Attending      Electronically Signed by         Aga Chamber, DO  02/20/18 7837

## 2018-12-28 ENCOUNTER — TRANSCRIBE ORDERS (OUTPATIENT)
Dept: ADMINISTRATIVE | Facility: HOSPITAL | Age: 23
End: 2018-12-28

## 2018-12-28 ENCOUNTER — TRANSCRIBE ORDERS (OUTPATIENT)
Dept: LAB | Facility: HOSPITAL | Age: 23
End: 2018-12-28

## 2018-12-28 ENCOUNTER — APPOINTMENT (OUTPATIENT)
Dept: LAB | Facility: HOSPITAL | Age: 23
End: 2018-12-28
Attending: INTERNAL MEDICINE
Payer: COMMERCIAL

## 2018-12-28 DIAGNOSIS — R10.12 ABDOMINAL PAIN, LEFT UPPER QUADRANT: ICD-10-CM

## 2018-12-28 DIAGNOSIS — R10.12 ABDOMINAL PAIN, LEFT UPPER QUADRANT: Primary | ICD-10-CM

## 2018-12-28 DIAGNOSIS — K58.8 OTHER IRRITABLE BOWEL SYNDROME: ICD-10-CM

## 2018-12-28 DIAGNOSIS — K58.9 IRRITABLE BOWEL SYNDROME, UNSPECIFIED TYPE: ICD-10-CM

## 2018-12-28 LAB — CRP SERPL QL: 7.9 MG/L

## 2018-12-28 PROCEDURE — 36415 COLL VENOUS BLD VENIPUNCTURE: CPT

## 2018-12-28 PROCEDURE — 86140 C-REACTIVE PROTEIN: CPT

## 2019-01-04 ENCOUNTER — HOSPITAL ENCOUNTER (OUTPATIENT)
Dept: RADIOLOGY | Facility: HOSPITAL | Age: 24
Discharge: HOME | End: 2019-01-04
Attending: INTERNAL MEDICINE
Payer: COMMERCIAL

## 2019-01-04 DIAGNOSIS — K58.9 IRRITABLE BOWEL SYNDROME, UNSPECIFIED TYPE: ICD-10-CM

## 2019-01-04 DIAGNOSIS — R10.12 ABDOMINAL PAIN, LEFT UPPER QUADRANT: ICD-10-CM

## 2019-01-04 PROCEDURE — 74150 CT ABDOMEN W/O CONTRAST: CPT

## 2019-02-28 ENCOUNTER — HOSPITAL ENCOUNTER (EMERGENCY)
Facility: HOSPITAL | Age: 24
Discharge: HOME | End: 2019-02-28
Attending: EMERGENCY MEDICINE | Admitting: EMERGENCY MEDICINE
Payer: COMMERCIAL

## 2019-02-28 VITALS
DIASTOLIC BLOOD PRESSURE: 90 MMHG | TEMPERATURE: 98 F | SYSTOLIC BLOOD PRESSURE: 133 MMHG | HEART RATE: 101 BPM | WEIGHT: 141 LBS | OXYGEN SATURATION: 98 % | BODY MASS INDEX: 28.43 KG/M2 | RESPIRATION RATE: 18 BRPM | HEIGHT: 59 IN

## 2019-02-28 DIAGNOSIS — R19.7 DIARRHEA: ICD-10-CM

## 2019-02-28 DIAGNOSIS — R10.10 PAIN OF UPPER ABDOMEN: Primary | ICD-10-CM

## 2019-02-28 DIAGNOSIS — R11.0 NAUSEA: ICD-10-CM

## 2019-02-28 LAB
ALBUMIN SERPL BCP-MCNC: 4 G/DL (ref 3.5–5)
ALP SERPL-CCNC: 75 U/L (ref 46–116)
ALT SERPL W P-5'-P-CCNC: 51 U/L (ref 12–78)
ANION GAP SERPL CALCULATED.3IONS-SCNC: 6 MMOL/L (ref 4–13)
AST SERPL W P-5'-P-CCNC: 20 U/L (ref 5–45)
BACTERIA UR QL AUTO: ABNORMAL /HPF
BASOPHILS # BLD AUTO: 0.02 THOUSANDS/ΜL (ref 0–0.1)
BASOPHILS NFR BLD AUTO: 0 % (ref 0–1)
BILIRUB SERPL-MCNC: 0.43 MG/DL (ref 0.2–1)
BILIRUB UR QL STRIP: NEGATIVE
BUN SERPL-MCNC: 11 MG/DL (ref 5–25)
CALCIUM SERPL-MCNC: 8.7 MG/DL (ref 8.3–10.1)
CHLORIDE SERPL-SCNC: 106 MMOL/L (ref 100–108)
CLARITY UR: CLEAR
CLARITY, POC: CLEAR
CO2 SERPL-SCNC: 24 MMOL/L (ref 21–32)
COLOR UR: YELLOW
COLOR, POC: YELLOW
CREAT SERPL-MCNC: 0.74 MG/DL (ref 0.6–1.3)
EOSINOPHIL # BLD AUTO: 0.08 THOUSAND/ΜL (ref 0–0.61)
EOSINOPHIL NFR BLD AUTO: 1 % (ref 0–6)
ERYTHROCYTE [DISTWIDTH] IN BLOOD BY AUTOMATED COUNT: 12.4 % (ref 11.6–15.1)
EXT PREG TEST URINE: NEGATIVE
GFR SERPL CREATININE-BSD FRML MDRD: 113 ML/MIN/1.73SQ M
GLUCOSE SERPL-MCNC: 98 MG/DL (ref 65–140)
GLUCOSE UR STRIP-MCNC: NEGATIVE MG/DL
HCT VFR BLD AUTO: 41 % (ref 34.8–46.1)
HGB BLD-MCNC: 13.7 G/DL (ref 11.5–15.4)
HGB UR QL STRIP.AUTO: ABNORMAL
IMM GRANULOCYTES # BLD AUTO: 0.02 THOUSAND/UL (ref 0–0.2)
IMM GRANULOCYTES NFR BLD AUTO: 0 % (ref 0–2)
KETONES UR STRIP-MCNC: NEGATIVE MG/DL
LEUKOCYTE ESTERASE UR QL STRIP: NEGATIVE
LIPASE SERPL-CCNC: 93 U/L (ref 73–393)
LYMPHOCYTES # BLD AUTO: 3.4 THOUSANDS/ΜL (ref 0.6–4.47)
LYMPHOCYTES NFR BLD AUTO: 34 % (ref 14–44)
MCH RBC QN AUTO: 30.1 PG (ref 26.8–34.3)
MCHC RBC AUTO-ENTMCNC: 33.4 G/DL (ref 31.4–37.4)
MCV RBC AUTO: 90 FL (ref 82–98)
MONOCYTES # BLD AUTO: 0.67 THOUSAND/ΜL (ref 0.17–1.22)
MONOCYTES NFR BLD AUTO: 7 % (ref 4–12)
MUCOUS THREADS UR QL AUTO: ABNORMAL
NEUTROPHILS # BLD AUTO: 5.89 THOUSANDS/ΜL (ref 1.85–7.62)
NEUTS SEG NFR BLD AUTO: 58 % (ref 43–75)
NITRITE UR QL STRIP: NEGATIVE
NON-SQ EPI CELLS URNS QL MICRO: ABNORMAL /HPF
NRBC BLD AUTO-RTO: 0 /100 WBCS
PH UR STRIP.AUTO: 5.5 [PH] (ref 4.5–8)
PLATELET # BLD AUTO: 356 THOUSANDS/UL (ref 149–390)
PMV BLD AUTO: 9 FL (ref 8.9–12.7)
POTASSIUM SERPL-SCNC: 3.7 MMOL/L (ref 3.5–5.3)
PROT SERPL-MCNC: 8.1 G/DL (ref 6.4–8.2)
PROT UR STRIP-MCNC: NEGATIVE MG/DL
RBC # BLD AUTO: 4.55 MILLION/UL (ref 3.81–5.12)
RBC #/AREA URNS AUTO: ABNORMAL /HPF
SODIUM SERPL-SCNC: 136 MMOL/L (ref 136–145)
SP GR UR STRIP.AUTO: >=1.03 (ref 1–1.03)
UROBILINOGEN UR QL STRIP.AUTO: 0.2 E.U./DL
WBC # BLD AUTO: 10.08 THOUSAND/UL (ref 4.31–10.16)
WBC #/AREA URNS AUTO: ABNORMAL /HPF

## 2019-02-28 PROCEDURE — 81003 URINALYSIS AUTO W/O SCOPE: CPT

## 2019-02-28 PROCEDURE — 85025 COMPLETE CBC W/AUTO DIFF WBC: CPT | Performed by: EMERGENCY MEDICINE

## 2019-02-28 PROCEDURE — 36415 COLL VENOUS BLD VENIPUNCTURE: CPT | Performed by: EMERGENCY MEDICINE

## 2019-02-28 PROCEDURE — 96374 THER/PROPH/DIAG INJ IV PUSH: CPT

## 2019-02-28 PROCEDURE — 83690 ASSAY OF LIPASE: CPT | Performed by: EMERGENCY MEDICINE

## 2019-02-28 PROCEDURE — 81001 URINALYSIS AUTO W/SCOPE: CPT

## 2019-02-28 PROCEDURE — 80053 COMPREHEN METABOLIC PANEL: CPT | Performed by: EMERGENCY MEDICINE

## 2019-02-28 PROCEDURE — 81025 URINE PREGNANCY TEST: CPT | Performed by: EMERGENCY MEDICINE

## 2019-02-28 PROCEDURE — 96361 HYDRATE IV INFUSION ADD-ON: CPT

## 2019-02-28 PROCEDURE — 99284 EMERGENCY DEPT VISIT MOD MDM: CPT

## 2019-02-28 RX ORDER — DICYCLOMINE HCL 20 MG
20 TABLET ORAL ONCE
Status: COMPLETED | OUTPATIENT
Start: 2019-02-28 | End: 2019-02-28

## 2019-02-28 RX ORDER — ONDANSETRON 2 MG/ML
4 INJECTION INTRAMUSCULAR; INTRAVENOUS ONCE
Status: COMPLETED | OUTPATIENT
Start: 2019-02-28 | End: 2019-02-28

## 2019-02-28 RX ORDER — ONDANSETRON 4 MG/1
4 TABLET, FILM COATED ORAL EVERY 8 HOURS PRN
Qty: 8 TABLET | Refills: 0 | Status: SHIPPED | OUTPATIENT
Start: 2019-02-28 | End: 2021-09-28

## 2019-02-28 RX ORDER — DICYCLOMINE HCL 20 MG
20 TABLET ORAL 2 TIMES DAILY
Qty: 14 TABLET | Refills: 0 | Status: SHIPPED | OUTPATIENT
Start: 2019-02-28 | End: 2019-03-07

## 2019-02-28 RX ORDER — MAGNESIUM HYDROXIDE/ALUMINUM HYDROXICE/SIMETHICONE 120; 1200; 1200 MG/30ML; MG/30ML; MG/30ML
30 SUSPENSION ORAL ONCE
Status: COMPLETED | OUTPATIENT
Start: 2019-02-28 | End: 2019-02-28

## 2019-02-28 RX ADMIN — ALUMINUM HYDROXIDE, MAGNESIUM HYDROXIDE, AND SIMETHICONE 30 ML: 200; 200; 20 SUSPENSION ORAL at 05:45

## 2019-02-28 RX ADMIN — LIDOCAINE HYDROCHLORIDE 15 ML: 20 SOLUTION ORAL; TOPICAL at 05:45

## 2019-02-28 RX ADMIN — DICYCLOMINE HYDROCHLORIDE 20 MG: 20 TABLET ORAL at 05:47

## 2019-02-28 RX ADMIN — SODIUM CHLORIDE 1000 ML: 0.9 INJECTION, SOLUTION INTRAVENOUS at 05:45

## 2019-02-28 RX ADMIN — ONDANSETRON 4 MG: 2 INJECTION INTRAMUSCULAR; INTRAVENOUS at 05:46

## 2019-02-28 NOTE — ED ATTENDING ATTESTATION
Cece Noe DO, saw and evaluated the patient  I have discussed the patient with the resident/non-physician practitioner and agree with the resident's/non-physician practitioner's findings, Plan of Care, and MDM as documented in the resident's/non-physician practitioner's note, except where noted  All available labs and Radiology studies were reviewed  I was present for key portions of any procedure(s) performed by the resident/non-physician practitioner and I was immediately available to provide assistance  At this point I agree with the current assessment done in the Emergency Department  I have conducted an independent evaluation of this patient a history and physical is as follows: The patient is a 68-year-old female with history of irritable bowel syndrome presenting with generalized abdominal pain that began approximately 8 hours ago  The no other sick contacts at home  No recent travel or recent antibiotics  Patient states pain began in her right lower quadrant but is brought across the lower abdomen in the left lower quadrant  Mild nausea but she states that is associated when the pain increases  No zoster rash present  Denies any chest pain, shortness of breath, of fever, chills, headaches, dysuria, hematuria, urinary frequency, vaginal bleeding  Patient states her last menses was at the beginning of the month and she normally gets her menses in the beginning of each month  Patient states she had previous history of gallstones status post cholecystectomy  Patient is afebrile, anicteric sclera, moist mucous membranes, heart is regular without murmurs rubs gallops, lungs are clear, abdomen has mild subjective tenderness diffusely but no rebound rigidity or guarding, no rash, no lower extremity edema or calf tenderness  Will check labs, urinalysis, administer GI cocktail, Zofran, IV fluids, p o  Bentyl and reassess    Labs and urinalysis are unremarkable, repeat exam is improved there is no abdominal tenderness on repeat exam   Patient remains afebrile  Reviewed return precautions with patient  Will give prescription for Zofran  [place] diet and advanced gently as tolerated, return if worsens      Critical Care Time  Procedures

## 2019-02-28 NOTE — ED PROVIDER NOTES
History  Chief Complaint   Patient presents with    Abdominal Pain     Patient reports abdominal pain and diarrhea beginning last night at 2200  Patient reports that the pain now starts on the RLQ and radiates to LLQ  Patient is a 40-year-old female with a past medical history significant for IBS, history of kidney stones, s/p cholecystectomy who presents with abdominal pain that started at 10:00 p m  Aga Emelia Patient reports that she started with right upper quadrant abdominal pain radiating to the epigastrium and the left upper quadrant, constant with waxing and waning, progressively worsening that is now 10/10 intensity, crampy in nature associated with a few episodes of nonbloody, nonmucoid diarrhea when the pain first started as well as nausea  Denies any fevers, chills, dysuria, hematuria, vaginal discharge, vaginal bleeding, vomiting  No recent travel or antibiotic use  No known sick contacts  Prior to Admission Medications   Prescriptions Last Dose Informant Patient Reported? Taking?   dicyclomine (BENTYL) 20 mg tablet   No Yes   Sig: Take 1 tablet (20 mg total) by mouth 2 (two) times a day   norethindrone-ethinyl estradiol-iron (JUNEL FE 1 5/30) 1 5-30 MG-MCG tablet   Yes Yes   Sig: Take 1 tablet by mouth daily      Facility-Administered Medications: None       Past Medical History:   Diagnosis Date    IBS (irritable bowel syndrome)     Kidney stones        Past Surgical History:   Procedure Laterality Date    CHOLECYSTECTOMY         History reviewed  No pertinent family history  I have reviewed and agree with the history as documented  Social History     Tobacco Use    Smoking status: Never Smoker    Smokeless tobacco: Never Used   Substance Use Topics    Alcohol use: Yes     Comment: social    Drug use: Never        Review of Systems   Constitutional: Negative for chills and fever  HENT: Negative for congestion and rhinorrhea      Eyes: Negative for photophobia and visual disturbance  Respiratory: Negative for cough and shortness of breath  Cardiovascular: Negative for chest pain and palpitations  Gastrointestinal: Positive for abdominal pain, diarrhea and nausea  Negative for abdominal distention, blood in stool, constipation and vomiting  Genitourinary: Negative for dysuria, hematuria, vaginal bleeding and vaginal discharge  Musculoskeletal: Negative for back pain and neck pain  Skin: Negative for pallor and rash  Neurological: Negative for dizziness, light-headedness and headaches  Physical Exam  ED Triage Vitals [02/28/19 0525]   Temperature Pulse Respirations Blood Pressure SpO2   98 °F (36 7 °C) 101 18 133/90 98 %      Temp Source Heart Rate Source Patient Position - Orthostatic VS BP Location FiO2 (%)   Oral Monitor Sitting Left arm --      Pain Score       Worst Possible Pain           Orthostatic Vital Signs  Vitals:    02/28/19 0525   BP: 133/90   Pulse: 101   Patient Position - Orthostatic VS: Sitting       Physical Exam   Constitutional: She is oriented to person, place, and time  She appears well-developed and well-nourished  Non-toxic appearance  She does not appear ill  No distress  HENT:   Head: Normocephalic and atraumatic  Right Ear: External ear normal    Left Ear: External ear normal    Nose: Nose normal    Mouth/Throat: Oropharynx is clear and moist    Eyes: Pupils are equal, round, and reactive to light  Conjunctivae and EOM are normal    Neck: Normal range of motion  Neck supple  No JVD present  No tracheal deviation present  Cardiovascular: Regular rhythm and normal heart sounds  No murmur heard  Mildly tachycardic   Pulmonary/Chest: Effort normal and breath sounds normal  No stridor  No respiratory distress  She has no wheezes  She has no rhonchi  She has no rales  She exhibits no tenderness  Abdominal: Soft  Normal appearance and bowel sounds are normal  She exhibits no distension and no mass   There is tenderness in the right upper quadrant, epigastric area and left upper quadrant  There is no rigidity, no rebound, no guarding and no tenderness at McBurney's point  Musculoskeletal: Normal range of motion  She exhibits no edema  Lymphadenopathy:     She has no cervical adenopathy  Neurological: She is alert and oriented to person, place, and time  No cranial nerve deficit  Coordination normal    Skin: Skin is warm and dry  No rash noted  No erythema  No pallor  Psychiatric: She has a normal mood and affect  Her behavior is normal    Nursing note and vitals reviewed        ED Medications  Medications   ondansetron (ZOFRAN) injection 4 mg (4 mg Intravenous Given 2/28/19 0546)   sodium chloride 0 9 % bolus 1,000 mL (0 mL Intravenous Stopped 2/28/19 0631)   lidocaine viscous (XYLOCAINE) 2 % mucosal solution 15 mL (15 mL Swish & Spit Given 2/28/19 0545)   aluminum-magnesium hydroxide-simethicone (MYLANTA) 200-200-20 mg/5 mL oral suspension 30 mL (30 mL Oral Given 2/28/19 0545)   dicyclomine (BENTYL) tablet 20 mg (20 mg Oral Given 2/28/19 0547)       Diagnostic Studies  Results Reviewed     Procedure Component Value Units Date/Time    Urine Microscopic [[id number]] Collected:  02/28/19 0628    Lab Status:  No result Specimen:  Urine, Clean Catch     POCT pregnancy, urine [[id number]]  (Normal) Resulted:  02/28/19 0627    Lab Status:  Final result Updated:  02/28/19 0627     EXT PREG TEST UR (Ref: Negative) negative    POCT urinalysis dipstick [[id number]]  (Normal) Resulted:  02/28/19 0626    Lab Status:  Final result Specimen:  Urine Updated:  02/28/19 0627     Color, UA yellow     Clarity, UA clear    ED Urine Macroscopic [[id number]]  (Abnormal) Collected:  02/28/19 0628    Lab Status:  Final result Specimen:  Urine Updated:  02/28/19 0625     Color, UA Yellow     Clarity, UA Clear     pH, UA 5 5     Leukocytes, UA Negative     Nitrite, UA Negative     Protein, UA Negative mg/dl      Glucose, UA Negative mg/dl      Ketones, UA Negative mg/dl      Urobilinogen, UA 0 2 E U /dl      Bilirubin, UA Negative     Blood, UA Trace     Specific Gravity, UA >=1 030    Narrative:       CLINITEK RESULT    Comprehensive metabolic panel [[id number]] Collected:  02/28/19 0541    Lab Status:  Final result Specimen:  Blood from Arm, Right Updated:  02/28/19 1466     Sodium 136 mmol/L      Potassium 3 7 mmol/L      Chloride 106 mmol/L      CO2 24 mmol/L      ANION GAP 6 mmol/L      BUN 11 mg/dL      Creatinine 0 74 mg/dL      Glucose 98 mg/dL      Calcium 8 7 mg/dL      AST 20 U/L      ALT 51 U/L      Alkaline Phosphatase 75 U/L      Total Protein 8 1 g/dL      Albumin 4 0 g/dL      Total Bilirubin 0 43 mg/dL      eGFR 113 ml/min/1 73sq m     Narrative:       National Kidney Disease Education Program recommendations are as follows:  GFR calculation is accurate only with a steady state creatinine  Chronic Kidney disease less than 60 ml/min/1 73 sq  meters  Kidney failure less than 15 ml/min/1 73 sq  meters      Lipase [[id number]]  (Normal) Collected:  02/28/19 0541    Lab Status:  Final result Specimen:  Blood from Arm, Right Updated:  02/28/19 0621     Lipase 93 u/L     CBC and differential [[id number]] Collected:  02/28/19 0541    Lab Status:  Final result Specimen:  Blood from Arm, Right Updated:  02/28/19 0557     WBC 10 08 Thousand/uL      RBC 4 55 Million/uL      Hemoglobin 13 7 g/dL      Hematocrit 41 0 %      MCV 90 fL      MCH 30 1 pg      MCHC 33 4 g/dL      RDW 12 4 %      MPV 9 0 fL      Platelets 077 Thousands/uL      nRBC 0 /100 WBCs      Neutrophils Relative 58 %      Immat GRANS % 0 %      Lymphocytes Relative 34 %      Monocytes Relative 7 %      Eosinophils Relative 1 %      Basophils Relative 0 %      Neutrophils Absolute 5 89 Thousands/µL      Immature Grans Absolute 0 02 Thousand/uL      Lymphocytes Absolute 3 40 Thousands/µL      Monocytes Absolute 0 67 Thousand/µL      Eosinophils Absolute 0 08 Thousand/µL      Basophils Absolute 0 02 Thousands/µL No orders to display         Procedures  Procedures      Phone Consults  ED Phone Contact    ED Course  ED Course as of Feb 28 0632   Thu Feb 28, 2019   0607 Patient reassessed s/p med administration- feels much improved, still some residual pain, but almost completely resolved  MDM  Number of Diagnoses or Management Options  Diagnosis management comments: Assessment and Plan:   Upper abdominal pain w/ nausea and diarrhea  Likely viral- will treat symptomatically, check labs, reassess  Disposition  Final diagnoses:   Pain of upper abdomen   Diarrhea   Nausea     Time reflects when diagnosis was documented in both MDM as applicable and the Disposition within this note     Time User Action Codes Description Comment    2/28/2019  6:25 AM Laurell Billing Add [R10 10] Pain of upper abdomen     2/28/2019  6:25 AM Laurell Billing Add [R19 7] Diarrhea     2/28/2019  6:25 AM Laurell Billing Add [R11 0] Nausea       ED Disposition     ED Disposition Condition Date/Time Comment    Discharge Stable Thu Feb 28, 2019  6:25 AM Bandar Catherine discharge to home/self care              Follow-up Information     Follow up With Specialties Details Why Contact Info Additional Information    Johanna Ballard MD Pediatrics Schedule an appointment as soon as possible for a visit in 3 days for re-evaluation [address]  [facility] Emergency Department Emergency Medicine Go to  As needed, If symptoms worsen, for re-evaluation [address] [place] [address], [address] [place] [address], [id number]          Patient's Medications   Discharge Prescriptions    DICYCLOMINE (BENTYL) 20 MG TABLET    Take 1 tablet (20 mg total) by mouth 2 (two) times a day for 7 days       Start Date: 2/28/2019 End Date: 3/7/2019       Order Dose: 20 mg       Quantity: 14 tablet    Refills: 0 ONDANSETRON (ZOFRAN) 4 MG TABLET    Take 1 tablet (4 mg total) by mouth every 8 (eight) hours as needed for nausea or vomiting for up to 5 days       Start Date: 2/28/2019 End Date: 3/5/2019       Order Dose: 4 mg       Quantity: 8 tablet    Refills: 0     No discharge procedures on file  ED Provider  Attending physically available and evaluated Chandler Mojica I managed the patient along with the ED Attending      Electronically Signed by         Epi Kulkarni DO  02/28/19 5210

## 2019-05-20 ENCOUNTER — TRANSCRIBE ORDERS (OUTPATIENT)
Dept: ADMINISTRATIVE | Facility: HOSPITAL | Age: 24
End: 2019-05-20

## 2019-05-20 ENCOUNTER — APPOINTMENT (OUTPATIENT)
Dept: RADIOLOGY | Facility: CLINIC | Age: 24
End: 2019-05-20
Payer: COMMERCIAL

## 2019-05-20 DIAGNOSIS — Z00.00 ROUTINE GENERAL MEDICAL EXAMINATION AT A HEALTH CARE FACILITY: ICD-10-CM

## 2019-05-20 DIAGNOSIS — Z00.00 ROUTINE GENERAL MEDICAL EXAMINATION AT A HEALTH CARE FACILITY: Primary | ICD-10-CM

## 2019-05-20 PROCEDURE — 71046 X-RAY EXAM CHEST 2 VIEWS: CPT

## 2019-08-30 ENCOUNTER — APPOINTMENT (OUTPATIENT)
Dept: RADIOLOGY | Facility: CLINIC | Age: 24
End: 2019-08-30
Payer: OTHER MISCELLANEOUS

## 2019-08-30 ENCOUNTER — APPOINTMENT (OUTPATIENT)
Dept: URGENT CARE | Facility: CLINIC | Age: 24
End: 2019-08-30
Payer: OTHER MISCELLANEOUS

## 2019-08-30 DIAGNOSIS — S69.92XA INJURY OF LEFT WRIST, INITIAL ENCOUNTER: Primary | ICD-10-CM

## 2019-08-30 DIAGNOSIS — S69.92XA INJURY OF LEFT WRIST, INITIAL ENCOUNTER: ICD-10-CM

## 2019-08-30 PROCEDURE — 73110 X-RAY EXAM OF WRIST: CPT

## 2019-08-30 PROCEDURE — G0382 LEV 3 HOSP TYPE B ED VISIT: HCPCS

## 2019-08-30 PROCEDURE — 99283 EMERGENCY DEPT VISIT LOW MDM: CPT

## 2019-09-04 ENCOUNTER — APPOINTMENT (OUTPATIENT)
Dept: URGENT CARE | Facility: CLINIC | Age: 24
End: 2019-09-04
Payer: OTHER MISCELLANEOUS

## 2019-09-04 PROCEDURE — 99213 OFFICE O/P EST LOW 20 MIN: CPT

## 2019-09-17 ENCOUNTER — APPOINTMENT (OUTPATIENT)
Dept: URGENT CARE | Facility: CLINIC | Age: 24
End: 2019-09-17
Payer: OTHER MISCELLANEOUS

## 2019-09-17 PROCEDURE — 99213 OFFICE O/P EST LOW 20 MIN: CPT | Performed by: PHYSICIAN ASSISTANT

## 2019-10-24 ENCOUNTER — APPOINTMENT (OUTPATIENT)
Dept: URGENT CARE | Facility: CLINIC | Age: 24
End: 2019-10-24
Payer: OTHER MISCELLANEOUS

## 2019-10-24 PROCEDURE — 99213 OFFICE O/P EST LOW 20 MIN: CPT | Performed by: NURSE PRACTITIONER

## 2019-11-04 ENCOUNTER — OCCMED (OUTPATIENT)
Dept: URGENT CARE | Facility: CLINIC | Age: 24
End: 2019-11-04
Payer: OTHER MISCELLANEOUS

## 2019-11-04 DIAGNOSIS — S63.502D SPRAIN OF LEFT FOREARM, SUBSEQUENT ENCOUNTER: Primary | ICD-10-CM

## 2019-11-04 PROCEDURE — 99213 OFFICE O/P EST LOW 20 MIN: CPT | Performed by: PHYSICIAN ASSISTANT

## 2019-12-20 ENCOUNTER — OCCMED (OUTPATIENT)
Dept: URGENT CARE | Facility: CLINIC | Age: 24
End: 2019-12-20
Payer: OTHER MISCELLANEOUS

## 2019-12-20 ENCOUNTER — APPOINTMENT (OUTPATIENT)
Dept: RADIOLOGY | Facility: CLINIC | Age: 24
End: 2019-12-20
Payer: OTHER MISCELLANEOUS

## 2019-12-20 DIAGNOSIS — R07.81 RIB PAIN ON RIGHT SIDE: ICD-10-CM

## 2019-12-20 DIAGNOSIS — R07.81 RIB PAIN ON RIGHT SIDE: Primary | ICD-10-CM

## 2019-12-20 PROCEDURE — 99283 EMERGENCY DEPT VISIT LOW MDM: CPT | Performed by: FAMILY MEDICINE

## 2019-12-20 PROCEDURE — 71101 X-RAY EXAM UNILAT RIBS/CHEST: CPT

## 2019-12-20 PROCEDURE — G0382 LEV 3 HOSP TYPE B ED VISIT: HCPCS | Performed by: FAMILY MEDICINE

## 2019-12-24 ENCOUNTER — APPOINTMENT (OUTPATIENT)
Dept: URGENT CARE | Facility: CLINIC | Age: 24
End: 2019-12-24
Payer: OTHER MISCELLANEOUS

## 2019-12-24 PROCEDURE — 99213 OFFICE O/P EST LOW 20 MIN: CPT | Performed by: NURSE PRACTITIONER

## 2019-12-30 ENCOUNTER — APPOINTMENT (OUTPATIENT)
Dept: URGENT CARE | Facility: CLINIC | Age: 24
End: 2019-12-30
Payer: OTHER MISCELLANEOUS

## 2019-12-30 PROCEDURE — 99213 OFFICE O/P EST LOW 20 MIN: CPT | Performed by: PHYSICIAN ASSISTANT

## 2020-02-07 ENCOUNTER — APPOINTMENT (OUTPATIENT)
Dept: URGENT CARE | Facility: CLINIC | Age: 25
End: 2020-02-07
Payer: OTHER MISCELLANEOUS

## 2020-02-07 ENCOUNTER — APPOINTMENT (OUTPATIENT)
Dept: RADIOLOGY | Facility: CLINIC | Age: 25
End: 2020-02-07
Payer: OTHER MISCELLANEOUS

## 2020-02-07 DIAGNOSIS — S39.92XA INJURY OF LOW BACK, INITIAL ENCOUNTER: Primary | ICD-10-CM

## 2020-02-07 DIAGNOSIS — S39.92XA INJURY OF LOW BACK, INITIAL ENCOUNTER: ICD-10-CM

## 2020-02-07 PROCEDURE — 72100 X-RAY EXAM L-S SPINE 2/3 VWS: CPT

## 2020-02-07 PROCEDURE — G0382 LEV 3 HOSP TYPE B ED VISIT: HCPCS | Performed by: PHYSICIAN ASSISTANT

## 2020-02-07 PROCEDURE — 99283 EMERGENCY DEPT VISIT LOW MDM: CPT | Performed by: PHYSICIAN ASSISTANT

## 2020-02-13 ENCOUNTER — APPOINTMENT (OUTPATIENT)
Dept: URGENT CARE | Facility: CLINIC | Age: 25
End: 2020-02-13
Payer: OTHER MISCELLANEOUS

## 2020-02-13 PROCEDURE — 99213 OFFICE O/P EST LOW 20 MIN: CPT | Performed by: PHYSICIAN ASSISTANT

## 2021-01-22 DIAGNOSIS — Z23 ENCOUNTER FOR IMMUNIZATION: ICD-10-CM

## 2021-01-26 ENCOUNTER — IMMUNIZATIONS (OUTPATIENT)
Dept: FAMILY MEDICINE CLINIC | Facility: HOSPITAL | Age: 26
End: 2021-01-26

## 2021-01-26 DIAGNOSIS — Z23 ENCOUNTER FOR IMMUNIZATION: Primary | ICD-10-CM

## 2021-01-26 PROCEDURE — 91301 SARS-COV-2 / COVID-19 MRNA VACCINE (MODERNA) 100 MCG: CPT

## 2021-01-26 PROCEDURE — 0011A SARS-COV-2 / COVID-19 MRNA VACCINE (MODERNA) 100 MCG: CPT

## 2021-02-22 ENCOUNTER — IMMUNIZATIONS (OUTPATIENT)
Dept: FAMILY MEDICINE CLINIC | Facility: HOSPITAL | Age: 26
End: 2021-02-22

## 2021-02-22 DIAGNOSIS — Z23 ENCOUNTER FOR IMMUNIZATION: Primary | ICD-10-CM

## 2021-02-22 PROCEDURE — 91301 SARS-COV-2 / COVID-19 MRNA VACCINE (MODERNA) 100 MCG: CPT

## 2021-02-22 PROCEDURE — 0012A SARS-COV-2 / COVID-19 MRNA VACCINE (MODERNA) 100 MCG: CPT

## 2021-03-23 ENCOUNTER — TELEPHONE (OUTPATIENT)
Dept: NEUROLOGY | Facility: CLINIC | Age: 26
End: 2021-03-23

## 2021-03-23 NOTE — TELEPHONE ENCOUNTER
Best contact number for patient: [phone]    Emergency Contact name and number:  Alexis Tee [phone]       Referring provider and telephone number:  Klarissa Hopper [phone]    Primary Care Provider Name and if affiliated with [facility]:   NO    Reason for Appointment/Dx:  ABNORMAL FINDINGS IN RADIOLOGY EXAM    Have you seen and followed up with a pediatric Neurologist for this disease in the past? NO  Neurology Location patient would like to be seen: Xenia Butt received? YES                                                Records Received? YES    Have you ever seen another Neurologist?     [address] [place] [address] Name: Deb Coffman    ID/Policy #: [id number]          Workman's Comp/ Accident/ School  Information      Workman's Comp/Accident/School related?    NO               Appointment date:   05/04/2021   8AM  DR HILARIO

## 2021-03-24 ENCOUNTER — TELEPHONE (OUTPATIENT)
Dept: NEUROLOGY | Facility: CLINIC | Age: 26
End: 2021-03-24

## 2021-03-24 ENCOUNTER — CONSULT (OUTPATIENT)
Dept: NEUROLOGY | Facility: CLINIC | Age: 26
End: 2021-03-24
Payer: COMMERCIAL

## 2021-03-24 VITALS
BODY MASS INDEX: 21.93 KG/M2 | HEIGHT: 59 IN | WEIGHT: 108.8 LBS | DIASTOLIC BLOOD PRESSURE: 74 MMHG | HEART RATE: 80 BPM | SYSTOLIC BLOOD PRESSURE: 123 MMHG

## 2021-03-24 DIAGNOSIS — I95.9 HYPOTENSION, UNSPECIFIED HYPOTENSION TYPE: ICD-10-CM

## 2021-03-24 DIAGNOSIS — R00.0 TACHYCARDIA: ICD-10-CM

## 2021-03-24 DIAGNOSIS — G62.9 SMALL FIBER NEUROPATHY: Primary | ICD-10-CM

## 2021-03-24 DIAGNOSIS — E23.6 PITUITARY CYST (HCC): ICD-10-CM

## 2021-03-24 PROCEDURE — 99245 OFF/OP CONSLTJ NEW/EST HI 55: CPT | Performed by: PSYCHIATRY & NEUROLOGY

## 2021-03-24 PROCEDURE — 3008F BODY MASS INDEX DOCD: CPT | Performed by: PSYCHIATRY & NEUROLOGY

## 2021-03-24 PROCEDURE — 1036F TOBACCO NON-USER: CPT | Performed by: PSYCHIATRY & NEUROLOGY

## 2021-03-24 RX ORDER — L.RHAMNOSUS/B.ANIMALIS(LACTIS) 3B CELL
CAPSULE ORAL
COMMUNITY

## 2021-03-24 RX ORDER — METOCLOPRAMIDE 5 MG/1
5 TABLET ORAL 4 TIMES DAILY
COMMUNITY
Start: 2021-03-09

## 2021-03-24 RX ORDER — DICYCLOMINE HYDROCHLORIDE 10 MG/1
10 CAPSULE ORAL
COMMUNITY
Start: 2020-08-21 | End: 2021-08-21

## 2021-03-24 RX ORDER — POLYETHYLENE GLYCOL 3350 17 G/17G
17 POWDER, FOR SOLUTION ORAL AS NEEDED
COMMUNITY

## 2021-03-24 RX ORDER — ESOMEPRAZOLE MAGNESIUM 20 MG/1
40 TABLET, DELAYED RELEASE ORAL
COMMUNITY

## 2021-03-24 RX ORDER — GABAPENTIN 100 MG/1
100 CAPSULE ORAL EVERY EVENING
COMMUNITY
Start: 2021-02-03 | End: 2022-06-29 | Stop reason: SDUPTHER

## 2021-03-24 NOTE — PROGRESS NOTES
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[address] )    Relevant Orders    MRI brain pituitary wo and w contrast    BUN    Creatinine, serum       Cardiovascular and Mediastinum    Hypotension       Nervous and Auditory    Small fiber neuropathy - Primary    Relevant Orders    MISCELLANEOUS LAB TEST       Other    Tachycardia         Mrs Srinivasa Harris was referred to [facility] sclerosis [facility] to rule out multiple sclerosis  We reviewed patient reports of the imaging involving brain and cervical spine, with no signs of CNS demyelination has been appreciated  Patient will bring CD with her imaging for my personal review  Considering constellation of patient clinical presentation including gastric paresis, intermittent tachycardia with hypotension, visual blurriness, and diffuse sensory dysfunction involving upper extremities in the face, considerations come for autonomic nervous dysfunction  Small fiber neuropathy might be explaining patient tingling sensation in the face as well as pains involving her arms and legs  Patient has preserved reflexes involving upper lower extremity, no large fiber neuropathy has been noted  Patient will be advised to do very basic blood work, prior serological workup completed at [facility] been reviewed with the patient, result discussed  Patient is to continue gabapentin to sensory dysfunction  She is to follow with Cardiology for blood pressure and heart rate dysfunction  Patient imaging studies suggested patient likely has pituitary cyst, we will consider repeating imaging with pituitary protocol in 3 months to comment on patient radiographic findings    Patient is to follow with primary care physician for pituitary gland hormonal panel, with treatment required if pathology noted  Patient is to follow with [facility] multiple sclerosis Center on as-needed basis  Patient is to continue practicing safety measures during the novel coronovirus public health emergency  Subjective:  Sensory dysfunction of the face and upper extremities, headaches, blurriness  HPI  Mrs Rolan Tavarez is a 25-year-old female who presented to [facility] multiple [address] for evaluation of multiple neurological complaints  Patient described intermittent headaches predominantly in right occipital region current on all of 2- 3 times a day, with longus headache lasting 45 minutes and shortness 1 is up to 10 minutes  Patient also reported blurriness every morning upon awaking and her blurriness last up to 2 minutes and then completely resolves with no visual dysfunction reported for the day  Patient described no double vision or vision loss  Patient also recognized sensory dysfunction involving predominantly right side of her face on an off, with single event of tingling sensation left side of her face has been noted  Patient also stated she has sensory disturbances involving her extremities from the elbows down to her fingers, in addition to sensory dysfunction in the legs in the form of 'legs are falling asleep', as per the patient  Patient recently was evaluated in the hospital for dehydration with known history of gastric paresis  Patient has inflammatory bowel syndrome since young age and recently evaluation was concluded gastric paresis, started 1 year ago  Patient has no remote history of diabetes or celiac disease  Patient also recalls having pains in the arms legs including muscles and nerves previously described as fibromyalgia  Patient has been taking probiotics  Patient had completed imaging studies of the brain and cervical spine we personally reviewed her report, patient has no signs of ischemic or hemorrhagic changes, no signs of CNS demyelination, or stroke    Patient also follows with cardiology for hypertension and tachycardia  No focal weakness has been noted on today's examination  No family history available, patient is adopted, as per the patient words  The following portions of the patient's history were reviewed and updated as appropriate: She  has a past medical history of IBS (irritable bowel syndrome) and Kidney stones  She   Patient Active Problem List    Diagnosis Date Noted    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[address] ) 03/24/2021    Tachycardia 03/24/2021    Hypotension 03/24/2021    Small fiber neuropathy 03/24/2021     She  has a past surgical history that includes Cholecystectomy  Her family history is not on file  She  reports that she has never smoked  She has never used smokeless tobacco  She reports current alcohol use  She reports that she does not use drugs  Current Outpatient Medications   Medication Sig Dispense Refill    dicyclomine (BENTYL) 10 mg capsule Take 10 mg by mouth      Esomeprazole Magnesium 20 MG TBEC Take 40 mg by mouth      gabapentin (NEURONTIN) 100 mg capsule Take 100 mg by mouth every evening      hyoscyamine (LEVSIN/SL) 0 125 mg SL tablet Take 0 125 mg by mouth 4 (four) times a day as needed      metoclopramide (REGLAN) 5 mg tablet Take 5 mg by mouth 2 (two) times a day      Motegrity 1 MG TABS Take 1 tablet by mouth daily      norethindrone-ethinyl estradiol-iron (JUNEL FE 1 5/30) 1 5-30 MG-MCG tablet Take 1 tablet by mouth daily      ondansetron (ZOFRAN) 4 mg tablet Take 1 tablet (4 mg total) by mouth every 8 (eight) hours as needed for nausea or vomiting for up to 5 days 8 tablet 0    polyethylene glycol (MIRALAX) 17 g packet Take 17 g by mouth daily      Probiotic Product ([facility]) CAPS Take by mouth      dicyclomine (BENTYL) 20 mg tablet Take 1 tablet (20 mg total) by mouth 2 (two) times a day (Patient not taking: Reported on 3/24/2021) 20 tablet 0     No current facility-administered medications for this visit  Current Outpatient Medications on File Prior to Visit   Medication Sig    dicyclomine (BENTYL) 10 mg capsule Take 10 mg by mouth    Esomeprazole Magnesium 20 MG TBEC Take 40 mg by mouth    gabapentin (NEURONTIN) 100 mg capsule Take 100 mg by mouth every evening    hyoscyamine (LEVSIN/SL) 0 125 mg SL tablet Take 0 125 mg by mouth 4 (four) times a day as needed    metoclopramide (REGLAN) 5 mg tablet Take 5 mg by mouth 2 (two) times a day    Motegrity 1 MG TABS Take 1 tablet by mouth daily    norethindrone-ethinyl estradiol-iron (JUNEL FE 1 5/30) 1 5-30 MG-MCG tablet Take 1 tablet by mouth daily    ondansetron (ZOFRAN) 4 mg tablet Take 1 tablet (4 mg total) by mouth every 8 (eight) hours as needed for nausea or vomiting for up to 5 days    polyethylene glycol (MIRALAX) 17 g packet Take 17 g by mouth daily    Probiotic Product ([address] [place] [address]) CAPS Take by mouth    dicyclomine (BENTYL) 20 mg tablet Take 1 tablet (20 mg total) by mouth 2 (two) times a day (Patient not taking: Reported on 3/24/2021)     No current facility-administered medications on file prior to visit  She is allergic to cyclobenzaprine; diazepam; fentanyl; medical tape; and ginger            Objective:    Blood pressure 123/74, pulse 80, height 4' 11" (1 499 m), weight 49 4 kg (108 lb 12 8 oz)  Physical Exam    Neurological Exam  CONSTITUTIONAL: NAD, pleasant  NECK: supple, no lymphadenopathy, no thyromegaly, no JVD  CARDIOVASCULAR: RRR, normal S1S2, no murmurs, no rubs  RESP: clear to auscultation bilaterally, no wheezes/rhonchi/rales  ABDOMEN: soft, non tender, non distended  SKIN: no rash or skin lesions  EXTREMITIES: no edema, pulses 2+bilaterally  PSYCH: appropriate mood and affect  NEUROLOGIC COMPREHENSIVE EXAM: Patient is oriented to person, place and time, NAD; appropriate affect   CN II, III, IV, V, VI, VII,VIII,IX,X,XI-XII intact with EOMI, PERRLA, OKN intact, VF grossly intact, fundi poorly visualized secondary to pupillary constriction; symmetric face noted  Motor: 5/5 UE/LE bilateral symmetric; Sensory: intact to light touch and pinprick bilaterally; normal vibration sensation feet bilaterally; Coordination within normal limits on FTN and XIOMARA testing; DTR: 2+/4 through, no Babinski, no clonus  Tandem gait is intact  Romberg: positive  ROS:  12 points of review of system was reviewed with the patient and was unremarkable with exception: see HPI  Review of Systems   Constitutional: Positive for fatigue  Negative for appetite change and fever  HENT: Negative  Negative for hearing loss, tinnitus, trouble swallowing and voice change  Eyes: Positive for visual disturbance (right worse than left)  Negative for photophobia and pain  Respiratory: Negative  Negative for shortness of breath  Cardiovascular: Negative  Negative for palpitations  Gastrointestinal: Negative  Negative for nausea and vomiting  Endocrine: Negative  Negative for cold intolerance  Genitourinary: Negative  Negative for dysuria, frequency and urgency  Musculoskeletal: Negative  Negative for myalgias and neck pain  Skin: Negative  Negative for rash  Allergic/Immunologic: Negative  Neurological: Positive for weakness (arms and legs), numbness (tingling, pins and needles right side of face) and headaches  Negative for dizziness, tremors, seizures, syncope, facial asymmetry, speech difficulty and light-headedness  Hematological: Negative  Does not bruise/bleed easily  Psychiatric/Behavioral: Positive for sleep disturbance  Negative for confusion and hallucinations

## 2021-03-24 NOTE — TELEPHONE ENCOUNTER
Spoke with patient to see if she wanted to come in today at 1pm with Dr Winsome Domínguez  Patient accepted

## 2021-03-30 ENCOUNTER — APPOINTMENT (OUTPATIENT)
Dept: LAB | Facility: CLINIC | Age: 26
End: 2021-03-30
Payer: COMMERCIAL

## 2021-03-30 DIAGNOSIS — G62.9 SMALL FIBER NEUROPATHY: ICD-10-CM

## 2021-03-30 DIAGNOSIS — E23.6 PITUITARY CYST (HCC): ICD-10-CM

## 2021-03-30 LAB
BUN SERPL-MCNC: 11 MG/DL (ref 5–25)
CREAT SERPL-MCNC: 0.76 MG/DL (ref 0.6–1.3)
GFR SERPL CREATININE-BSD FRML MDRD: 108 ML/MIN/1.73SQ M

## 2021-03-30 PROCEDURE — 82542 COL CHROMOTOGRAPHY QUAL/QUAN: CPT

## 2021-03-30 PROCEDURE — 84520 ASSAY OF UREA NITROGEN: CPT

## 2021-03-30 PROCEDURE — 36415 COLL VENOUS BLD VENIPUNCTURE: CPT

## 2021-03-30 PROCEDURE — 82565 ASSAY OF CREATININE: CPT

## 2021-04-08 ENCOUNTER — HOSPITAL ENCOUNTER (OUTPATIENT)
Dept: MRI IMAGING | Facility: HOSPITAL | Age: 26
Discharge: HOME | End: 2021-04-08
Attending: PSYCHIATRY & NEUROLOGY
Payer: COMMERCIAL

## 2021-04-08 DIAGNOSIS — E23.6 PITUITARY CYST (HCC): ICD-10-CM

## 2021-04-08 PROCEDURE — 70553 MRI BRAIN STEM W/O & W/DYE: CPT

## 2021-04-08 PROCEDURE — G1004 CDSM NDSC: HCPCS

## 2021-04-08 PROCEDURE — A9585 GADOBUTROL INJECTION: HCPCS | Performed by: PSYCHIATRY & NEUROLOGY

## 2021-04-08 RX ADMIN — GADOBUTROL 5 ML: 604.72 INJECTION INTRAVENOUS at 10:51

## 2021-04-09 ENCOUNTER — TELEPHONE (OUTPATIENT)
Dept: NEUROLOGY | Facility: CLINIC | Age: 26
End: 2021-04-09

## 2021-04-09 NOTE — TELEPHONE ENCOUNTER
If sensory dysfunction comes with headaches, then yes, Aki Dye will be likely triggering this neurologic condition and we advise progesterone only BCP

## 2021-04-09 NOTE — TELEPHONE ENCOUNTER
Pt left voicemail asking for call regarding recent lab results  Called pt, reviewed Cr and BUN results  Pt then states she saw her gynecologist and they wanted her to confirm if her facial tingling and blurred vision could be related to her birth control pill Junel? Pt notes her gynecologist said it is unlikely but it does rarely occur  Pt notes she has been on Junel for 10 years  Please advise  [phone]  Okay to leave detailed message

## 2021-04-12 ENCOUNTER — TELEPHONE (OUTPATIENT)
Dept: NEUROLOGY | Facility: CLINIC | Age: 26
End: 2021-04-12

## 2021-04-12 NOTE — TELEPHONE ENCOUNTER
Patient called to report a "seizure" this morning  Patient states this is the first time anything like this has happened  Patient states her whole body stiffened, her arms and legs were shaking and she could not talk  Patient tried to speak but she states only grunts came out  Patient reports she had head pain after this occurrence for appx 5 minutes and then it stopped  Patient states the seizure lasted for 30 seconds to 1 minutes  She did not go to the ER  Patient had consult on 3/24 for a pituitary cyst      MRI performed on 4/8  Results are still pending  Follow up eliane/Altagracia 7/6     [phone] to leave detailed msg  I advised patient to call PCP  Dr Leo Feldman - Please advise

## 2021-04-12 NOTE — TELEPHONE ENCOUNTER
Patient imaging reviewed and patient has normal findings, no CVA/ICH/MS/neoplasm ( will wait for a formal reports)  Patient will be advised to follow with ER  Dr Garett Frazier - do you have protocol or guidelines your team has been using in circumstances first "seizure" episode? If I am ordering EEG - who will be the best person to see the patient for follow up?   Thank you in advance

## 2021-04-12 NOTE — TELEPHONE ENCOUNTER
With new onset seizure, she should go to the ED for urgent evaluation for provoking causes  If this seizure was deemed to be unprovoked, then start with routine EEG, if normal then 24 hours ambulatory EEG study  She already has an MRI brain study  Generally, first seizure evaluation should include a history of other prior episodes of altered awareness, loss of consciousness, or recurrent motor activity that would be suspicious for seizure  If no other seizure is identified, I hold off on starting an AED (unless I am convinced that there is a strong risk for recurrent unprovoked seizures)

## 2021-04-19 LAB — MISCELLANEOUS LAB TEST RESULT: NORMAL

## 2021-04-22 NOTE — TELEPHONE ENCOUNTER
Pt made aware, she verbalizes understanding  Pt reports she was recently admitted to [facility] for new onset seizures  She has a referral to see neuro for seizures  Advised her to contact scheduling to be scheduled with a provider specializing in this  She is agreeable

## 2021-05-17 ENCOUNTER — TELEPHONE (OUTPATIENT)
Dept: NEUROLOGY | Facility: CLINIC | Age: 26
End: 2021-05-17

## 2021-05-17 NOTE — TELEPHONE ENCOUNTER
Pt calling, states she was originally referred to our office by her endocrinologist for something on her pituitary  States She has a repeat MRI and was told this was normal  States her endocrinologist is again stating she needs to see neurology to r/o MS  States her blurry vision is getting worse  Pt did clarify that she is not interested in continuing to see [facility] neurology  States the upcoming appts on 5/19 and 5/20 are for an EEG so she will keep these  When attempting to discuss pt's plan of care or questions she may have, she states "I just want an appointment, this is ridiculous " Pt is requesting an appt sooner than current appt with Nilton Rojo in July  Offered pt appt with Nilton Rojo on 5/20/21, pt accepted this  Transferred call to Sammie Chaudhary to confirm pt's information

## 2021-05-20 ENCOUNTER — OFFICE VISIT (OUTPATIENT)
Dept: NEUROLOGY | Facility: CLINIC | Age: 26
End: 2021-05-20
Payer: COMMERCIAL

## 2021-05-20 VITALS
WEIGHT: 115.6 LBS | BODY MASS INDEX: 23.31 KG/M2 | HEIGHT: 59 IN | HEART RATE: 77 BPM | SYSTOLIC BLOOD PRESSURE: 120 MMHG | DIASTOLIC BLOOD PRESSURE: 78 MMHG

## 2021-05-20 DIAGNOSIS — R56.9 SEIZURE-LIKE ACTIVITY (HCC): ICD-10-CM

## 2021-05-20 DIAGNOSIS — H53.8 BLURRED VISION, BILATERAL: ICD-10-CM

## 2021-05-20 DIAGNOSIS — R90.89 ABNORMAL BRAIN MRI: ICD-10-CM

## 2021-05-20 DIAGNOSIS — R20.2 PARESTHESIAS: Primary | ICD-10-CM

## 2021-05-20 PROCEDURE — 3008F BODY MASS INDEX DOCD: CPT | Performed by: PHYSICIAN ASSISTANT

## 2021-05-20 PROCEDURE — 1036F TOBACCO NON-USER: CPT | Performed by: PHYSICIAN ASSISTANT

## 2021-05-20 PROCEDURE — 99215 OFFICE O/P EST HI 40 MIN: CPT | Performed by: PHYSICIAN ASSISTANT

## 2021-05-20 RX ORDER — GABAPENTIN 100 MG/1
CAPSULE ORAL
COMMUNITY
Start: 2021-04-22 | End: 2021-08-31 | Stop reason: SDUPTHER

## 2021-05-20 RX ORDER — ONDANSETRON 4 MG/1
TABLET, FILM COATED ORAL
COMMUNITY
Start: 2021-04-01

## 2021-05-20 RX ORDER — ACETAMINOPHEN AND CODEINE PHOSPHATE 120; 12 MG/5ML; MG/5ML
SOLUTION ORAL DAILY
COMMUNITY
Start: 2021-04-22

## 2021-05-20 NOTE — PROGRESS NOTES
Patient ID: Joya Wilkinson is a 30 y o  female  Assessment/Plan:    Paresthesias  · Reviewed labs with pt  · Vitamin B12 was on the lower end  She will take vitamin B12 1000mcg and see if she has any improvement  · SPEP and UPEP will be ordered  · An EMG will be ordered as well  I have spent 45 minutes with Patient  today in which greater than 50% of this time was spent in counseling/coordination of care regarding Diagnostic results, Prognosis, Risks and benefits of tx options, Intructions for management, Patient and family education, Importance of tx compliance, Risk factor reductions and Impressions  She will follow-up in 4 months  Abnormal brain MRI  · Pt had a normal brain MRI at [facility]  She was then seen at [facility] and a repeated brain MRI again showed a pituitary abnormality  · She will bring the CD to the office so it can be reviewed  Seizure-like activity ([facility] [address] )  · Pt had a normal routine EEG and 24 hour EEG  · She did have a second 24 hour EEG through [facility] which is pending  · No further events  · Observe off AEDs  Blurred vision, bilateral  · Pt will be seeing an eye specialist in July  Problem List Items Addressed This Visit        Other    Paresthesias - Primary     · Reviewed labs with pt  · Vitamin B12 was on the lower end  She will take vitamin B12 1000mcg and see if she has any improvement  · SPEP and UPEP will be ordered  · An EMG will be ordered as well  I have spent 45 minutes with Patient  today in which greater than 50% of this time was spent in counseling/coordination of care regarding Diagnostic results, Prognosis, Risks and benefits of tx options, Intructions for management, Patient and family education, Importance of tx compliance, Risk factor reductions and Impressions  She will follow-up in 4 months           Relevant Orders    EMG 1 Upper/1 Lower Neuropathy    Protein electrophoresis, serum    Protein electrophoresis, urine    Blurred vision, bilateral · Pt will be seeing an eye specialist in July  Seizure-like activity ([facility] [address] )     · Pt had a normal routine EEG and 24 hour EEG  · She did have a second 24 hour EEG through [facility] which is pending  · No further events  · Observe off AEDs  Abnormal brain MRI     · Pt had a normal brain MRI at [facility]  She was then seen at [facility] and a repeated brain MRI again showed a pituitary abnormality  · She will bring the CD to the office so it can be reviewed  Subjective:    ALISON Guerra is a 41-year-old female, with IBS, who follows in the office due to paresthesias and blurred vision  There was a question of a pituitary cyst but repeat imaging at [place] [address] did not show any abnormality  She presented to [facility] in April due to limb shaking and hallucinations  History from [facility]: Patient states the event started around 2AM, when she woke up, she saw a dark shadow passing through the doorway and went into the other room, she thought she was hallucinating, for her  checked on the other room and found nothing  She didn't fell asleep, but rather, she had an abnormal sensation in the back of the head, followed immediately w/ whole body tightening up, and rhythmic shaking of the arms  She was trying to move but unable to move her arms or body, she was not able to call her , only making grunts  She was fully coherent during the event, but unable to control her body  The event lasted for about 30 seconds and resolved, she felt completely drained after that, and had a posterior headache that lasted about 5 minutes  She denies tongue biting or urinary incontinence  She had another MRI at [facility] which again showed a question of an abnormality  A routine EEG and 24 hour EEG were negative  She had a 24 hour EEG repeated which is pending  She has not had any further events  Patient continues to report blurred vision every morning upon awaking  It can return throughout the day   Patient described no double vision or vision loss  She did see an Ophthalmologist and no abnormalities were found  She has an appt with a specialist in June  She continues to report paresthesias in her legs and arms  She has also now been having the same paresthesias in the face  She reports carry a diagnosis of fibromyalgia but was told these symptoms were not due to fibromyalgia  The following portions of the patient's history were reviewed and updated as appropriate: allergies, current medications, past family history, past medical history, past social history, past surgical history and problem list          Objective:    Blood pressure 120/78, pulse 77, height 4' 11" (1 499 m), weight 52 4 kg (115 lb 9 6 oz)  Physical Exam  Vitals signs reviewed  Eyes:      General: Lids are normal       Extraocular Movements: Extraocular movements intact  Pupils: Pupils are equal, round, and reactive to light  Neurological:      Coordination: Coordination is intact  Deep Tendon Reflexes: Strength normal and reflexes are normal and symmetric  Psychiatric:         Speech: Speech normal          Neurological Exam  Mental Status   Oriented to person, place, time and situation  Recent and remote memory are intact  Speech is normal  Language is fluent with no aphasia  Attention and concentration are normal  Fund of knowledge is appropriate for level of education  Apraxia absent  Cranial Nerves  CN II: Visual acuity is normal  Visual fields full to confrontation  CN III, IV, VI: Extraocular movements intact bilaterally  Normal lids and orbits bilaterally  Pupils equal round and reactive to light bilaterally  CN V: Facial sensation is normal   CN VII: Full and symmetric facial movement  CN VIII: Hearing is normal   CN IX, X: Palate elevates symmetrically  Normal gag reflex  CN XI: Shoulder shrug strength is normal   CN XII: Tongue midline without atrophy or fasciculations      Motor   Strength is 5/5 throughout all four extremities  Sensory  Sensation is intact to light touch, pinprick, vibration and proprioception in all four extremities  Reflexes  Deep tendon reflexes are 2+ and symmetric in all four extremities with downgoing toes bilaterally  Coordination  Finger-to-nose, rapid alternating movements and heel-to-shin normal bilaterally without dysmetria  Gait  Normal casual, toe, heel and tandem gait  ROS:    Review of Systems   Constitutional: Negative  Negative for appetite change and fever  HENT: Negative  Negative for hearing loss, tinnitus, trouble swallowing and voice change  Eyes: Positive for visual disturbance (blurr vision)  Negative for photophobia and pain  Respiratory: Negative  Negative for shortness of breath  Cardiovascular: Negative  Negative for palpitations  Gastrointestinal: Negative  Negative for nausea and vomiting  Endocrine: Negative  Negative for cold intolerance  Genitourinary: Negative  Negative for dysuria, frequency and urgency  Musculoskeletal: Negative  Negative for myalgias and neck pain  Skin: Negative  Negative for rash  Allergic/Immunologic: Negative  Neurological: Negative  Negative for dizziness, tremors, seizures, syncope, facial asymmetry, speech difficulty, weakness, light-headedness, numbness and headaches  Patient states sxs facial tingles  Low blood pressure on and off   Hematological: Negative  Does not bruise/bleed easily  Psychiatric/Behavioral: Negative  Negative for confusion, hallucinations and sleep disturbance  Review of systems personally reviewed

## 2021-05-20 NOTE — PATIENT INSTRUCTIONS
Paresthesias  · Reviewed labs with pt  · Vitamin B12 was on the lower end  She will take vitamin B12 1000mcg and see if she has any improvement  · SPEP and UPEP will be ordered  · An EMG will be ordered as well  I have spent 45 minutes with Patient  today in which greater than 50% of this time was spent in counseling/coordination of care regarding Diagnostic results, Prognosis, Risks and benefits of tx options, Intructions for management, Patient and family education, Importance of tx compliance, Risk factor reductions and Impressions  She will follow-up in 4 months  Abnormal brain MRI  · Pt had a [facility] brain MRI at [facility]  She was then seen at [facility] and a repeated brain MRI again showed a pituitary abnormality  · She will bring the CD to the office so it can be reviewed  Seizure-like activity ([facility] [address] )  · Pt had a normal routine EEG and 24 hour EEG  · She did have a second 24 hour EEG through [facility] which is pending  · No further events  · Observe off AEDs  Blurred vision, bilateral  · Pt will be seeing an eye specialist in July

## 2021-05-20 NOTE — ASSESSMENT & PLAN NOTE
· Pt had a normal routine EEG and 24 hour EEG  · She did have a second 24 hour EEG through [facility] which is pending  · No further events  · Observe off AEDs

## 2021-05-20 NOTE — ASSESSMENT & PLAN NOTE
· Reviewed labs with pt  · Vitamin B12 was on the lower end  She will take vitamin B12 1000mcg and see if she has any improvement  · SPEP and UPEP will be ordered  · An EMG will be ordered as well  I have spent 45 minutes with Patient  today in which greater than 50% of this time was spent in counseling/coordination of care regarding Diagnostic results, Prognosis, Risks and benefits of tx options, Intructions for management, Patient and family education, Importance of tx compliance, Risk factor reductions and Impressions  She will follow-up in 4 months

## 2021-05-20 NOTE — ASSESSMENT & PLAN NOTE
· Pt had a normal brain MRI at [facility]  She was then seen at [facility] and a repeated brain MRI again showed a pituitary abnormality  · She will bring the CD to the office so it can be reviewed

## 2021-08-27 ENCOUNTER — TELEPHONE (OUTPATIENT)
Dept: NEUROLOGY | Facility: CLINIC | Age: 26
End: 2021-08-27

## 2021-08-27 NOTE — TELEPHONE ENCOUNTER
LMOM to confirm appointment for 08/31/2021 @ 8am with Dr Zhang Drilling in the [facility] location

## 2021-08-31 ENCOUNTER — OFFICE VISIT (OUTPATIENT)
Dept: NEUROLOGY | Facility: CLINIC | Age: 26
End: 2021-08-31
Payer: COMMERCIAL

## 2021-08-31 VITALS
HEART RATE: 77 BPM | DIASTOLIC BLOOD PRESSURE: 61 MMHG | BODY MASS INDEX: 25.12 KG/M2 | WEIGHT: 124.6 LBS | SYSTOLIC BLOOD PRESSURE: 97 MMHG | HEIGHT: 59 IN

## 2021-08-31 DIAGNOSIS — I95.9 HYPOTENSION, UNSPECIFIED HYPOTENSION TYPE: ICD-10-CM

## 2021-08-31 DIAGNOSIS — E23.6 PITUITARY CYST (HCC): ICD-10-CM

## 2021-08-31 DIAGNOSIS — G62.9 SMALL FIBER NEUROPATHY: Primary | ICD-10-CM

## 2021-08-31 DIAGNOSIS — H90.3 SENSORINEURAL HEARING LOSS (SNHL) OF BOTH EARS: ICD-10-CM

## 2021-08-31 DIAGNOSIS — H53.2 DOUBLE VISION WITH BOTH EYES OPEN: ICD-10-CM

## 2021-08-31 DIAGNOSIS — R42 VERTIGO: ICD-10-CM

## 2021-08-31 DIAGNOSIS — G43.809 VESTIBULAR MIGRAINE: ICD-10-CM

## 2021-08-31 PROCEDURE — 1036F TOBACCO NON-USER: CPT | Performed by: PSYCHIATRY & NEUROLOGY

## 2021-08-31 PROCEDURE — 99215 OFFICE O/P EST HI 40 MIN: CPT | Performed by: PSYCHIATRY & NEUROLOGY

## 2021-08-31 PROCEDURE — 3008F BODY MASS INDEX DOCD: CPT | Performed by: PSYCHIATRY & NEUROLOGY

## 2021-08-31 RX ORDER — TOPIRAMATE 25 MG/1
TABLET ORAL
Qty: 120 TABLET | Refills: 2 | Status: SHIPPED | OUTPATIENT
Start: 2021-08-31 | End: 2021-09-28

## 2021-08-31 RX ORDER — MECLIZINE HCL 12.5 MG/1
12.5 TABLET ORAL EVERY 12 HOURS PRN
Qty: 60 TABLET | Refills: 1 | Status: SHIPPED | OUTPATIENT
Start: 2021-08-31 | End: 2022-01-30

## 2021-08-31 NOTE — PROGRESS NOTES
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[facility])       Cardiovascular and Mediastinum    Hypotension    Vestibular migraine    Relevant Medications    topiramate (TOPAMAX) 25 mg tablet    meclizine (ANTIVERT) 12 5 MG tablet    Other Relevant Orders    MRI angiogram head without contrast    Sedimentation rate, automated    C-reactive protein       Nervous and Auditory    Small fiber neuropathy - Primary    Sensorineural hearing loss (SNHL) of both ears       Other    Vertigo    Double vision with both eyes open    Relevant Orders    MRI angiogram head without contrast    Sedimentation rate, automated    C-reactive protein          Ms Ann Marie Fisher has presented for follow-up on blurred vision and paresthesias with no new focal neurological deficit has been described:  - patient described vertigo started in July and Vertigo has been triggering headaches - ENT evaluation concluded patient has vestibular migraine; Patient has been performing Epley maneuver with her vertigo improves after the procedure at home; Patient has SNHL b/l which has progressed   - topamax 25 mg and meclizine 12 5 mg were provided for 1 month trial;  - Cardiology and Ophthalmology evaluation has been completed  - patient has left eye weakness in adduction with MRA head advised in the light of SNHL and recent vertigo  ESR/CRP advised   - known small fiber neuropathy is stable, may worsen with initiation of topamax    - no new focal neurologic dysfunction reported    FU in 4 weeks     Subjective:  Bouts of vertigo    HPI    Mrs Ann Marie Fisher was referred to [address] [place] [address] multiple sclerosis Center to rule out multiple sclerosis    We reviewed patient reports completing imaging involving brain and cervical spine, with no signs of CNS demyelination has been appreciated, no consideration of MS at this point  Patient will bring CD with her imaging for my personal review    Kelsi Haynes described having developing abruptly vertigo in July 2021 when she was lying in the bed has required changing position to the sitting up which prompted room spinning sensation, with event lasted for hours  Patient had 5 other bouts of vertigo and with using neck and had maneuvers seen in her father, vertigo has been subsiding within 1 hour  Patient also described ringing in the ears and pressure with the left side predominantly, patient has longstanding history of sensorineural hearing loss  Patient was seen by Cardiology, and Ophthalmology  Patient also described having headache in the right occipital region starts usually after bout of vertigo and photophobia is usually seen with her headaches  No focal neurological dysfunction has been reported during those events  Considering constellation of patient clinical presentation including gastric paresis, intermittent tachycardia with hypotension, visual blurriness, and diffuse sensory dysfunction involving upper extremities in the face, considerations come for autonomic nervous dysfunction  Small fiber neuropathy might be explaining patient tingling sensation in the face as well as pains involving her arms and legs  Patient has preserved reflexes involving upper lower extremity, no large fiber neuropathy has been noted  Patient will be advised to do very basic blood work, prior serological workup completed at [facility] been reviewed with the patient, result discussed  Patient is to continue gabapentin to sensory dysfunction  She is to follow with Cardiology for blood pressure and heart rate dysfunction  Patient imaging studies suggested patient likely has pituitary cyst, we will consider repeating imaging with pituitary protocol in 3 months to comment on patient radiographic findings    Patient is to follow with primary care physician for pituitary gland hormonal panel, with treatment required if pathology noted  The following portions of the patient's history were reviewed and updated as appropriate:   She  has a past medical history of IBS (irritable bowel syndrome) and Kidney stones  She   Patient Active Problem List    Diagnosis Date Noted    Sensorineural hearing loss (SNHL) of both ears 08/31/2021    Vertigo 08/31/2021    Vestibular migraine 08/31/2021    Double vision with both eyes open 08/31/2021    Paresthesias 05/20/2021    Blurred vision, bilateral 05/20/2021    Seizure-like activity ([facility] [address] ) 05/20/2021    Abnormal brain MRI 05/20/2021    Pituitary cyst ([facility] [address] ) 03/24/2021    Tachycardia 03/24/2021    Hypotension 03/24/2021    Small fiber neuropathy 03/24/2021     She  has a past surgical history that includes Cholecystectomy  Her family history is not on file  She  reports that she has never smoked  She has never used smokeless tobacco  She reports current alcohol use  She reports that she does not use drugs  Current Outpatient Medications   Medication Sig Dispense Refill    ACIDOPHILUS LACTOBACILLUS PO Take by mouth prn      Esomeprazole Magnesium 20 MG TBEC Take 40 mg by mouth      gabapentin (NEURONTIN) 100 mg capsule Take 100 mg by mouth every evening      hyoscyamine (LEVSIN/SL) 0 125 mg SL tablet Take 0 125 mg by mouth 4 (four) times a day as needed      metoclopramide (REGLAN) 5 mg tablet Take 5 mg by mouth 2 (two) times a day Takes 4 times a day   Motegrity 1 MG TABS Take 1 tablet by mouth daily      norethindrone (Incassia) 0 35 MG tablet daily      ondansetron (ZOFRAN) 4 mg tablet TAKE 1 TABLET (4 MG TOTAL) BY MOUTH NIGHTLY AS NEEDED FOR NAUSEA OR VOMITING        polyethylene glycol (MIRALAX) 17 g packet Take 17 g by mouth as needed       Probiotic Product ([address]) CAPS Take by mouth      dicyclomine (BENTYL) 10 mg capsule Take 10 mg by mouth      dicyclomine (BENTYL) 20 mg tablet Take 1 tablet (20 mg total) by mouth 2 (two) times a day (Patient not taking: Reported on 3/24/2021) 20 tablet 0    meclizine (ANTIVERT) 12 5 MG tablet Take 1 tablet (12 5 mg total) by mouth every 12 (twelve) hours as needed for dizziness (headache) 60 tablet 1    norethindrone-ethinyl estradiol-iron (JUNEL FE 1 5/30) 1 5-30 MG-MCG tablet Take 1 tablet by mouth daily (Patient not taking: Reported on 8/31/2021)      ondansetron (ZOFRAN) 4 mg tablet Take 1 tablet (4 mg total) by mouth every 8 (eight) hours as needed for nausea or vomiting for up to 5 days 8 tablet 0    topiramate (TOPAMAX) 25 mg tablet Take 1 tab at night for 3 nights, then 1 tab bid x 3days, then 1 tab in am and 2 tabs at night x 3 nights, then 2 tabs bid 120 tablet 2     No current facility-administered medications for this visit  Current Outpatient Medications on File Prior to Visit   Medication Sig    ACIDOPHILUS LACTOBACILLUS PO Take by mouth prn    Esomeprazole Magnesium 20 MG TBEC Take 40 mg by mouth    gabapentin (NEURONTIN) 100 mg capsule Take 100 mg by mouth every evening    hyoscyamine (LEVSIN/SL) 0 125 mg SL tablet Take 0 125 mg by mouth 4 (four) times a day as needed    metoclopramide (REGLAN) 5 mg tablet Take 5 mg by mouth 2 (two) times a day Takes 4 times a day   Motegrity 1 MG TABS Take 1 tablet by mouth daily    norethindrone (Incassia) 0 35 MG tablet daily    ondansetron (ZOFRAN) 4 mg tablet TAKE 1 TABLET (4 MG TOTAL) BY MOUTH NIGHTLY AS NEEDED FOR NAUSEA OR VOMITING      polyethylene glycol (MIRALAX) 17 g packet Take 17 g by mouth as needed     Probiotic Product ([facility]) CAPS Take by mouth    dicyclomine (BENTYL) 10 mg capsule Take 10 mg by mouth    dicyclomine (BENTYL) 20 mg tablet Take 1 tablet (20 mg total) by mouth 2 (two) times a day (Patient not taking: Reported on 3/24/2021)    norethindrone-ethinyl estradiol-iron (JUNEL FE 1 5/30) 1 5-30 MG-MCG tablet Take 1 tablet by mouth daily (Patient not taking: Reported on 8/31/2021)    ondansetron (ZOFRAN) 4 mg tablet Take 1 tablet (4 mg total) by mouth every 8 (eight) hours as needed for nausea or vomiting for up to 5 days    [DISCONTINUED] gabapentin (NEURONTIN) 100 mg capsule TAKE 1 CAPSULE BY MOUTH EVERY DAY IN THE EVENING (Patient not taking: Reported on 8/31/2021)     No current facility-administered medications on file prior to visit  She is allergic to cyclobenzaprine, diazepam, fentanyl, medical tape, prednisone, and ginger - food allergy            Objective:    Blood pressure 97/61, pulse 77, height 4' 11" (1 499 m), weight 56 5 kg (124 lb 9 6 oz)  Physical Exam    Neurological Exam  CONSTITUTIONAL: NAD, pleasant  NECK: supple, no lymphadenopathy, no thyromegaly, no JVD  CARDIOVASCULAR: RRR, normal S1S2, no murmurs, no rubs  RESP: clear to auscultation bilaterally, no wheezes/rhonchi/rales  ABDOMEN: soft, non tender, non distended  SKIN: no rash or skin lesions  EXTREMITIES: no edema, pulses 2+bilaterally  PSYCH: appropriate mood and affect  NEUROLOGIC COMPREHENSIVE EXAM: Patient is oriented to person, place and time, NAD; appropriate affect  CN II, III, IV, V, VI, VII,VIII,IX,X,XI-XII intact with EOMI with double vision on horizontal gaze to the right with limited movement of OS, PERRLA, OKN intact, VF grossly intact, fundi poorly visualized secondary to pupillary constriction; symmetric face noted  Motor: 5/5 UE/LE bilateral symmetric; Sensory: intact to light touch and pinprick bilaterally; normal vibration sensation feet bilaterally; Coordination within normal limits on FTN and XIOMARA testing; DTR: 2/4 through, no Babinski, no clonus  Tandem gait is intact  Romberg: negative  ROS:  12 points of review of system was reviewed with the patient and was unremarkable with exception: see HPI  Review of Systems   Constitutional: Negative  Negative for appetite change and fever  HENT: Positive for tinnitus (from vertigo pt has been having )   Negative for hearing loss, trouble swallowing and voice change  Eyes: Positive for pain (feels like lighting bolt coming through her eye  )  Negative for photophobia  Respiratory: Negative  Negative for shortness of breath  Cardiovascular: Negative  Negative for palpitations  Gastrointestinal: Positive for constipation  Negative for nausea and vomiting  Endocrine: Negative  Negative for cold intolerance  Genitourinary: Negative  Negative for dysuria, frequency and urgency  Musculoskeletal: Positive for neck pain and neck stiffness  Negative for myalgias  Skin: Negative  Negative for rash  Neurological: Positive for dizziness and numbness (tingles down her arms and hands, sometimes the legs  )  Negative for tremors, seizures, syncope, facial asymmetry, speech difficulty, weakness, light-headedness and headaches  Hematological: Negative  Does not bruise/bleed easily  Psychiatric/Behavioral: Negative  Negative for confusion, hallucinations and sleep disturbance

## 2021-09-10 ENCOUNTER — TELEPHONE (OUTPATIENT)
Dept: NEUROLOGY | Facility: CLINIC | Age: 26
End: 2021-09-10

## 2021-09-10 NOTE — TELEPHONE ENCOUNTER
Received VM from patient @ 110 pm reporting a reaction to topiramate  Called patient, states she has not had any issues with meclizine since starting it, though she started Topamax today at 12 pm (before taking her lunch)  Approx 30 min after taking the med, she developed face and tough numbness and tingling  No throat or tongue swelling, no trouble with swallowing  Able to move her face/not fixed or drooping  She states that will no longer be taking this medication  Best call back [phone] okay with detailed messages

## 2021-09-14 ENCOUNTER — HOSPITAL ENCOUNTER (OUTPATIENT)
Dept: MRI IMAGING | Facility: HOSPITAL | Age: 26
Discharge: HOME | End: 2021-09-14
Attending: PSYCHIATRY & NEUROLOGY
Payer: COMMERCIAL

## 2021-09-14 DIAGNOSIS — G43.809 VESTIBULAR MIGRAINE: ICD-10-CM

## 2021-09-14 DIAGNOSIS — H53.2 DOUBLE VISION WITH BOTH EYES OPEN: ICD-10-CM

## 2021-09-14 PROCEDURE — G1004 CDSM NDSC: HCPCS

## 2021-09-14 PROCEDURE — 70544 MR ANGIOGRAPHY HEAD W/O DYE: CPT

## 2021-09-21 ENCOUNTER — APPOINTMENT (OUTPATIENT)
Dept: LAB | Facility: CLINIC | Age: 26
End: 2021-09-21
Payer: COMMERCIAL

## 2021-09-21 DIAGNOSIS — H53.2 DOUBLE VISION WITH BOTH EYES OPEN: ICD-10-CM

## 2021-09-21 DIAGNOSIS — G43.809 VESTIBULAR MIGRAINE: ICD-10-CM

## 2021-09-21 DIAGNOSIS — R20.2 PARESTHESIAS: ICD-10-CM

## 2021-09-21 LAB
CRP SERPL QL: <3 MG/L
ERYTHROCYTE [SEDIMENTATION RATE] IN BLOOD: 16 MM/HOUR (ref 0–19)

## 2021-09-21 PROCEDURE — 84165 PROTEIN E-PHORESIS SERUM: CPT

## 2021-09-21 PROCEDURE — 86140 C-REACTIVE PROTEIN: CPT

## 2021-09-21 PROCEDURE — 84165 PROTEIN E-PHORESIS SERUM: CPT | Performed by: PATHOLOGY

## 2021-09-21 PROCEDURE — 84166 PROTEIN E-PHORESIS/URINE/CSF: CPT | Performed by: PATHOLOGY

## 2021-09-21 PROCEDURE — 85652 RBC SED RATE AUTOMATED: CPT

## 2021-09-21 PROCEDURE — 36415 COLL VENOUS BLD VENIPUNCTURE: CPT

## 2021-09-21 PROCEDURE — 84166 PROTEIN E-PHORESIS/URINE/CSF: CPT | Performed by: PHYSICIAN ASSISTANT

## 2021-09-22 LAB
ALBUMIN SERPL ELPH-MCNC: 4.56 G/DL (ref 3.5–5)
ALBUMIN SERPL ELPH-MCNC: 60.8 % (ref 52–65)
ALBUMIN UR ELPH-MCNC: 100 %
ALPHA1 GLOB MFR UR ELPH: 0 %
ALPHA1 GLOB SERPL ELPH-MCNC: 0.25 G/DL (ref 0.1–0.4)
ALPHA1 GLOB SERPL ELPH-MCNC: 3.3 % (ref 2.5–5)
ALPHA2 GLOB MFR UR ELPH: 0 %
ALPHA2 GLOB SERPL ELPH-MCNC: 0.74 G/DL (ref 0.4–1.2)
ALPHA2 GLOB SERPL ELPH-MCNC: 9.8 % (ref 7–13)
B-GLOBULIN MFR UR ELPH: 0 %
BETA GLOB ABNORMAL SERPL ELPH-MCNC: 0.44 G/DL (ref 0.4–0.8)
BETA1 GLOB SERPL ELPH-MCNC: 5.8 % (ref 5–13)
BETA2 GLOB SERPL ELPH-MCNC: 5 % (ref 2–8)
BETA2+GAMMA GLOB SERPL ELPH-MCNC: 0.38 G/DL (ref 0.2–0.5)
GAMMA GLOB ABNORMAL SERPL ELPH-MCNC: 1.15 G/DL (ref 0.5–1.6)
GAMMA GLOB MFR UR ELPH: 0 %
GAMMA GLOB SERPL ELPH-MCNC: 15.3 % (ref 12–22)
IGG/ALB SER: 1.55 {RATIO} (ref 1.1–1.8)
PROT PATTERN SERPL ELPH-IMP: NORMAL
PROT PATTERN UR ELPH-IMP: NORMAL
PROT SERPL-MCNC: 7.5 G/DL (ref 6.4–8.2)
PROT UR-MCNC: 8 MG/DL

## 2021-09-28 ENCOUNTER — TELEMEDICINE (OUTPATIENT)
Dept: NEUROLOGY | Facility: CLINIC | Age: 26
End: 2021-09-28
Payer: COMMERCIAL

## 2021-09-28 ENCOUNTER — TELEPHONE (OUTPATIENT)
Dept: NEUROLOGY | Facility: CLINIC | Age: 26
End: 2021-09-28

## 2021-09-28 DIAGNOSIS — G43.809 VESTIBULAR MIGRAINE: Primary | ICD-10-CM

## 2021-09-28 DIAGNOSIS — H53.2 DOUBLE VISION WITH BOTH EYES OPEN: ICD-10-CM

## 2021-09-28 PROCEDURE — 99214 OFFICE O/P EST MOD 30 MIN: CPT | Performed by: PSYCHIATRY & NEUROLOGY

## 2021-09-28 RX ORDER — ZONISAMIDE 25 MG/1
25 CAPSULE ORAL DAILY
Qty: 14 CAPSULE | Refills: 0 | Status: SHIPPED | OUTPATIENT
Start: 2021-09-28 | End: 2022-06-29

## 2021-09-28 NOTE — PROGRESS NOTES
Patient ID: Dylan Jolley is a 30 y o  female  Assessment/Plan:    No problem-specific Assessment & Plan notes found for this encounter  {Assess/PlanSmartLinks:60708}       Subjective:    HPI    {[facility] HPI texts:35542}    {Common ambulatory SmartLinks:46570}         Objective: There were no vitals taken for this visit  Physical Exam    Neurological Exam      ROS:    Review of Systems   Constitutional: Negative  Negative for appetite change and fever  HENT: Negative  Negative for hearing loss, tinnitus, trouble swallowing and voice change  Eyes: Positive for pain  Negative for photophobia  Respiratory: Negative  Negative for shortness of breath  Cardiovascular: Negative  Negative for palpitations  Gastrointestinal: Positive for constipation  Negative for nausea and vomiting  Endocrine: Negative  Negative for cold intolerance  Genitourinary: Negative  Negative for dysuria, frequency and urgency  Musculoskeletal: Positive for neck pain  Negative for myalgias  Skin: Negative  Negative for rash  Neurological: Positive for dizziness and headaches  Negative for tremors, seizures, syncope, facial asymmetry, speech difficulty, weakness, light-headedness and numbness  Hematological: Negative  Does not bruise/bleed easily  Psychiatric/Behavioral: Negative  Negative for confusion, hallucinations and sleep disturbance

## 2021-09-28 NOTE — PROGRESS NOTES
Virtual Regular Visit    Verification of patient location:    Patient is located in the following state in which I hold an active license [place]      Assessment/Plan:    Problem List Items Addressed This Visit        Cardiovascular and Mediastinum    Vestibular migraine - Primary    Relevant Medications    zonisamide (Zonegran) 25 mg capsule    Other Relevant Orders    Acetylcholine receptor, binding    Acetylcholine receptor, blocking    Acetylcholine receptor, modulating       Other    Double vision with both eyes open    Relevant Orders    Voltage Gated Calcium Channel (VGCC) Ab Assay    MUSK Antibody               Reason for visit is Follow up visit   Chief Complaint   Patient presents with    Virtual Regular Visit        Encounter provider Alexander Mendez MD    Provider located at [address] [facility]  [place] [id number]      Recent Visits  Date Type Provider Dept   09/28/21 Telephone Alexander Mendez MD [facility]   09/28/21 Telemedicine Alexander Mendez MD [facility] [place] [facility]   Showing recent visits within past 7 days and meeting all other requirements  Future Appointments  No visits were found meeting these conditions  Showing future appointments within next 150 days and meeting all other requirements       The patient was identified by name and date of birth  Josseline Poon was informed that this is a telemedicine visit and that the visit is being conducted through Telephone  My office door was closed  No one else was in the room  She acknowledged consent and understanding of privacy and security of the video platform  The patient has agreed to participate and understands they can discontinue the visit at any time  Patient is aware this is a billable service  Subjective  Josseline Poon is a 30 y o  female with visual dysfunction  HPI   Ms   Reba Hubbard has presented for follow-up on blurred vision and paresthesias with no new focal neurological deficit has been described:  Patient described vertigo started in July and Vertigo has been triggering headaches - ENT evaluation concluded patient has vestibular migraine; Patient has been performing Epley maneuver with her vertigo improves after the procedure at home; Patient has SNHL b/l which has progressed  Topamax 25 mg and meclizine 12 5 mg were provided for 1 month trial, patient was not able to tolerate topamax;  Cardiology and Ophthalmology evaluation has been completed; Patient has left eye weakness in adduction with MRA head advised in the light of SNHL and recent vertigo  ESR/CRP advised and negative  Patienknown small fiber neuropathy is stable, may worsen with initiation of topamax, and side effects overwhelmed the patient   - no new focal neurologic dysfunction reported    ASSESSMENT PLAN:    Mr Uday Kan has presented for follow up on headaches and visual dysfunction:   - evaluation for double vision was completed with no brainstem pathology, normal MRA head and negative ESR/CRP; work up for myasthenia gravis advised; Vision therapy might be offered    - transient bouts of paresthesia - MRI brain with no intracranial pathology SPEP was negative;  - zonisamide 25 mg HS will be further advised for headache prevention - if no side effects reported, will be offering increasing dose to 50 mgHS in 7 days;  - patient is to continue meclizine   - patient is to continue working with [employer] neurology to complete her work up for epilepsy  Follow up with [facility] neurology is on as needed bases       Past Medical History:   Diagnosis Date    IBS (irritable bowel syndrome)     Kidney stones        Past Surgical History:   Procedure Laterality Date    CHOLECYSTECTOMY         Current Outpatient Medications   Medication Sig Dispense Refill    ACIDOPHILUS LACTOBACILLUS PO Take by mouth prn      Esomeprazole Magnesium 20 MG TBEC Take 40 mg by mouth      gabapentin (NEURONTIN) 100 mg capsule Take 100 mg by mouth every evening      hyoscyamine (LEVSIN/SL) 0 125 mg SL tablet Take 0 125 mg by mouth 4 (four) times a day as needed      meclizine (ANTIVERT) 12 5 MG tablet Take 1 tablet (12 5 mg total) by mouth every 12 (twelve) hours as needed for dizziness (headache) 60 tablet 1    metoclopramide (REGLAN) 5 mg tablet Take 5 mg by mouth 4 (four) times a day Takes 4 times a day   Motegrity 1 MG TABS Take 1 tablet by mouth daily      norethindrone (Incassia) 0 35 MG tablet daily      ondansetron (ZOFRAN) 4 mg tablet Take 1 tablet (4 mg total) by mouth every 8 (eight) hours as needed for nausea or vomiting for up to 5 days 8 tablet 0    ondansetron (ZOFRAN) 4 mg tablet TAKE 1 TABLET (4 MG TOTAL) BY MOUTH NIGHTLY AS NEEDED FOR NAUSEA OR VOMITING   polyethylene glycol (MIRALAX) 17 g packet Take 17 g by mouth as needed       Probiotic Product ([address] [place] [address]) CAPS Take by mouth      dicyclomine (BENTYL) 10 mg capsule Take 10 mg by mouth      dicyclomine (BENTYL) 20 mg tablet Take 1 tablet (20 mg total) by mouth 2 (two) times a day (Patient not taking: Reported on 3/24/2021) 20 tablet 0    norethindrone-ethinyl estradiol-iron (JUNEL FE 1 5/30) 1 5-30 MG-MCG tablet Take 1 tablet by mouth daily (Patient not taking: Reported on 8/31/2021)      zonisamide (Zonegran) 25 mg capsule Take 1 capsule (25 mg total) by mouth daily 14 capsule 0     No current facility-administered medications for this visit  Allergies   Allergen Reactions    Cyclobenzaprine Other (See Comments)    Diazepam GI Intolerance, Abdominal Pain and Other (See Comments)     Other reaction(s): Dyspepsia    Fentanyl Tachycardia    Medical Tape Other (See Comments)     ekg electrodes- blisters    Prednisone Other (See Comments)     Uncontrolled bladder   Ginger - Food Allergy Rash       Review of Systems   Constitutional: Negative  Negative for appetite change and fever     HENT: Negative  Negative for hearing loss, tinnitus, trouble swallowing and voice change  Eyes: Positive for pain (with migraines)  Negative for photophobia  Respiratory: Negative  Negative for shortness of breath  Cardiovascular: Negative  Negative for palpitations  Gastrointestinal: Positive for constipation  Negative for nausea and vomiting  Endocrine: Negative  Negative for cold intolerance  Genitourinary: Negative  Negative for dysuria, frequency and urgency  Musculoskeletal: Positive for neck pain  Negative for myalgias  Skin: Negative  Negative for rash  Neurological: Positive for dizziness and headaches  Negative for tremors, seizures, syncope, facial asymmetry, speech difficulty, weakness, light-headedness and numbness  Hematological: Negative  Does not bruise/bleed easily  Psychiatric/Behavioral: Negative  Negative for confusion, hallucinations and sleep disturbance  Video Exam    There were no vitals filed for this visit  Physical Exam     I spent 15 minutes with patient today in which greater than 50% of the time was spent in counseling/coordination of care regarding pathophysiology of underlying neurologic dysfunction    VIRTUAL VISIT DISCLAIMER      Chanel Thompson verbally agrees to participate in [employer]  Pt is aware that [employer] could be limited without vital signs or the ability to perform a full hands-on physical exam  Renetta Womack understands she or the provider may request at any time to terminate the video visit and request the patient to seek care or treatment in person

## 2021-11-08 ENCOUNTER — OFFICE VISIT (OUTPATIENT)
Dept: URGENT CARE | Facility: CLINIC | Age: 26
End: 2021-11-08
Payer: COMMERCIAL

## 2021-11-08 ENCOUNTER — APPOINTMENT (OUTPATIENT)
Dept: RADIOLOGY | Facility: CLINIC | Age: 26
End: 2021-11-08
Payer: COMMERCIAL

## 2021-11-08 VITALS
HEART RATE: 66 BPM | DIASTOLIC BLOOD PRESSURE: 68 MMHG | BODY MASS INDEX: 25.2 KG/M2 | HEIGHT: 59 IN | RESPIRATION RATE: 18 BRPM | TEMPERATURE: 97.9 F | SYSTOLIC BLOOD PRESSURE: 106 MMHG | WEIGHT: 125 LBS

## 2021-11-08 DIAGNOSIS — S39.012A STRAIN OF LUMBAR REGION, INITIAL ENCOUNTER: ICD-10-CM

## 2021-11-08 DIAGNOSIS — M54.9 BACK PAIN, UNSPECIFIED BACK LOCATION, UNSPECIFIED BACK PAIN LATERALITY, UNSPECIFIED CHRONICITY: ICD-10-CM

## 2021-11-08 DIAGNOSIS — S39.012A STRAIN OF LUMBAR REGION, INITIAL ENCOUNTER: Primary | ICD-10-CM

## 2021-11-08 PROCEDURE — 72100 X-RAY EXAM L-S SPINE 2/3 VWS: CPT

## 2021-11-08 RX ORDER — SENNOSIDES 8.6 MG
TABLET ORAL
COMMUNITY
Start: 2021-09-13

## 2021-11-09 ENCOUNTER — NURSE TRIAGE (OUTPATIENT)
Dept: PHYSICAL THERAPY | Facility: OTHER | Age: 26
End: 2021-11-09

## 2021-11-09 DIAGNOSIS — M54.50 ACUTE MIDLINE LOW BACK PAIN WITHOUT SCIATICA: Primary | ICD-10-CM

## 2022-01-27 DIAGNOSIS — G43.809 VESTIBULAR MIGRAINE: ICD-10-CM

## 2022-01-30 RX ORDER — MECLIZINE HCL 12.5 MG/1
12.5 TABLET ORAL EVERY 12 HOURS PRN
Qty: 60 TABLET | Refills: 1 | Status: SHIPPED | OUTPATIENT
Start: 2022-01-30

## 2022-05-28 ENCOUNTER — OFFICE VISIT (OUTPATIENT)
Dept: URGENT CARE | Facility: CLINIC | Age: 27
End: 2022-05-28
Payer: COMMERCIAL

## 2022-05-28 VITALS
HEART RATE: 77 BPM | WEIGHT: 145 LBS | HEIGHT: 59 IN | SYSTOLIC BLOOD PRESSURE: 108 MMHG | OXYGEN SATURATION: 98 % | RESPIRATION RATE: 18 BRPM | TEMPERATURE: 98.3 F | DIASTOLIC BLOOD PRESSURE: 70 MMHG | BODY MASS INDEX: 29.23 KG/M2

## 2022-05-28 DIAGNOSIS — H10.12 ALLERGIC CONJUNCTIVITIS OF LEFT EYE: Primary | ICD-10-CM

## 2022-05-28 PROCEDURE — 99213 OFFICE O/P EST LOW 20 MIN: CPT | Performed by: PREVENTIVE MEDICINE

## 2022-05-28 RX ORDER — NEOMYCIN SULFATE, POLYMYXIN B SULFATE AND DEXAMETHASONE 3.5; 10000; 1 MG/ML; [USP'U]/ML; MG/ML
1 SUSPENSION/ DROPS OPHTHALMIC 4 TIMES DAILY
Qty: 5 ML | Refills: 0 | Status: SHIPPED | OUTPATIENT
Start: 2022-05-28

## 2022-05-28 NOTE — PROGRESS NOTES
[email or web address]        NAME: Byron Roa is a 27 y o  female  : 1995    MRN: [id number]  DATE: May 28, 2022  TIME: 9:08 AM    Assessment and Plan   Allergic conjunctivitis of left eye [H10 12]  1  Allergic conjunctivitis of left eye           Patient Instructions       Follow up with PCP in 3-5 days  Proceed to  ER if symptoms worsen  Chief Complaint     Chief Complaint   Patient presents with    Eye Pain     Pt reports left eye redness, itching, and discharge for one day  History of Present Illness       Red itchy left eye times 24 hours  Review of Systems   Review of Systems   Eyes: Positive for discharge, redness and itching           Current Medications       Current Outpatient Medications:     ACIDOPHILUS LACTOBACILLUS PO, Take by mouth prn, Disp: , Rfl:     [facility] Senna 8 6 MG tablet, , Disp: , Rfl:     Esomeprazole Magnesium 20 MG TBEC, Take 40 mg by mouth, Disp: , Rfl:     gabapentin (NEURONTIN) 100 mg capsule, Take 100 mg by mouth every evening, Disp: , Rfl:     hyoscyamine (LEVSIN/SL) 0 125 mg SL tablet, Take 0 125 mg by mouth 4 (four) times a day as needed, Disp: , Rfl:     meclizine (ANTIVERT) 12 5 MG tablet, TAKE 1 TABLET (12 5 MG TOTAL) BY MOUTH EVERY 12 (TWELVE) HOURS AS NEEDED FOR DIZZINESS (HEADACHE), Disp: 60 tablet, Rfl: 1    metoclopramide (REGLAN) 5 mg tablet, Take 5 mg by mouth 4 (four) times a day Takes 4 times a day , Disp: , Rfl:     Motegrity 1 MG TABS, Take 1 tablet by mouth daily, Disp: , Rfl:     norethindrone (MICRONOR) 0 35 MG tablet, daily, Disp: , Rfl:     ondansetron (ZOFRAN) 4 mg tablet, TAKE 1 TABLET (4 MG TOTAL) BY MOUTH NIGHTLY AS NEEDED FOR NAUSEA OR VOMITING , Disp: , Rfl:     polyethylene glycol (MIRALAX) 17 g packet, Take 17 g by mouth as needed , Disp: , Rfl:     Probiotic Product ([facility]) CAPS, Take by mouth, Disp: , Rfl:     zonisamide (Zonegran) 25 mg capsule, Take 1 capsule (25 mg total) by mouth daily, Disp: 14 capsule, Rfl: 0    dicyclomine (BENTYL) 10 mg capsule, Take 10 mg by mouth, Disp: , Rfl:     dicyclomine (BENTYL) 20 mg tablet, Take 1 tablet (20 mg total) by mouth 2 (two) times a day (Patient not taking: No sig reported), Disp: 20 tablet, Rfl: 0    norethindrone-ethinyl estradiol-iron (JUNEL FE 1 5/30) 1 5-30 MG-MCG tablet, Take 1 tablet by mouth daily (Patient not taking: Reported on 8/31/2021), Disp: , Rfl:     ondansetron (ZOFRAN) 4 mg tablet, Take 1 tablet (4 mg total) by mouth every 8 (eight) hours as needed for nausea or vomiting for up to 5 days, Disp: 8 tablet, Rfl: 0    Current Allergies     Allergies as of 05/28/2022 - Reviewed 05/28/2022   Allergen Reaction Noted    Cyclobenzaprine Tachycardia 06/30/2014    Diazepam GI Intolerance, Abdominal Pain, and Other (See Comments) 06/30/2014    Fentanyl Tachycardia 11/11/2019    Medical tape Other (See Comments) 03/19/2020    Prednisone Other (See Comments) 08/31/2021    Ginger - food allergy Rash 06/30/2020            The following portions of the patient's history were reviewed and updated as appropriate: allergies, current medications, past family history, past medical history, past social history, past surgical history and problem list      Past Medical History:   Diagnosis Date    IBS (irritable bowel syndrome)     Kidney stones        Past Surgical History:   Procedure Laterality Date    CHOLECYSTECTOMY         No family history on file  Medications have been verified  Objective   /70   Pulse 77   Temp 98 3 °F (36 8 °C)   Resp 18   Ht 4' 11" (1 499 m)   Wt 65 8 kg (145 lb)   SpO2 98%   BMI 29 29 kg/m²   No LMP recorded  Physical Exam     Physical Exam  Eyes:      Comments:  The sclera is injected the conjunctiva is hyperemic and there is a slight discharge on the lid

## 2022-06-29 ENCOUNTER — OFFICE VISIT (OUTPATIENT)
Dept: NEUROLOGY | Facility: CLINIC | Age: 27
End: 2022-06-29
Payer: COMMERCIAL

## 2022-06-29 VITALS
SYSTOLIC BLOOD PRESSURE: 115 MMHG | HEIGHT: 59 IN | HEART RATE: 80 BPM | DIASTOLIC BLOOD PRESSURE: 69 MMHG | WEIGHT: 142 LBS | BODY MASS INDEX: 28.63 KG/M2 | TEMPERATURE: 98.1 F

## 2022-06-29 DIAGNOSIS — R56.9 SEIZURE-LIKE ACTIVITY (HCC): ICD-10-CM

## 2022-06-29 DIAGNOSIS — G43.109 MIGRAINE WITH AURA AND WITHOUT STATUS MIGRAINOSUS, NOT INTRACTABLE: ICD-10-CM

## 2022-06-29 DIAGNOSIS — G62.9 SMALL FIBER NEUROPATHY: ICD-10-CM

## 2022-06-29 DIAGNOSIS — M79.7 FIBROMYALGIA MUSCLE PAIN: ICD-10-CM

## 2022-06-29 DIAGNOSIS — G43.809 VESTIBULAR MIGRAINE: Primary | ICD-10-CM

## 2022-06-29 PROCEDURE — 99215 OFFICE O/P EST HI 40 MIN: CPT | Performed by: PSYCHIATRY & NEUROLOGY

## 2022-06-29 RX ORDER — GABAPENTIN 100 MG/1
100 CAPSULE ORAL 3 TIMES DAILY
Qty: 90 CAPSULE | Refills: 3 | Status: SHIPPED | OUTPATIENT
Start: 2022-06-29

## 2022-06-29 RX ORDER — PROPRANOLOL HYDROCHLORIDE 10 MG/1
10 TABLET ORAL 2 TIMES DAILY
Qty: 120 TABLET | Refills: 5 | Status: SHIPPED | OUTPATIENT
Start: 2022-06-29 | End: 2022-07-22

## 2022-06-29 RX ORDER — DEXAMETHASONE 2 MG/1
2 TABLET ORAL DAILY PRN
Qty: 5 TABLET | Refills: 2 | Status: SHIPPED | OUTPATIENT
Start: 2022-06-29

## 2022-06-29 NOTE — PROGRESS NOTES
[facility]  PATIENT:  Jerilyn Hernandes  MRN:  [id number]  :  1995  DATE OF SERVICE:  2022    Assessment/Plan:           Problem List Items Addressed This Visit        Cardiovascular and Mediastinum    Vestibular migraine - Primary    Relevant Medications    propranolol (INDERAL) 10 mg tablet    dexamethasone (DECADRON) 2 mg tablet    gabapentin (NEURONTIN) 100 mg capsule    Migraine with aura and without status migrainosus, not intractable    Relevant Medications    propranolol (INDERAL) 10 mg tablet    gabapentin (NEURONTIN) 100 mg capsule       Nervous and Auditory    Small fiber neuropathy       Other    Seizure-like activity (HCC)    Fibromyalgia muscle pain    Relevant Medications    gabapentin (NEURONTIN) 100 mg capsule         Mrs Reina Soria has presented to [facility] multiple [address] for follow-up on visual disturbances  Since last office visit in 2021, patient described worsening headache at this time patient described migraine headaches with visual phenomenon as well as visual disturbances  Previous imaging of the brain including intracranial vascular imaging suggest against intracranial pathology, no consideration for multiple sclerosis  Workup for myasthenia gravis has been pending  Patient was not able to tolerate Zonegran and Topamax previously, as per the patient  Patient had no other bouts of vertigo since 2021  Considering her underlying clinical presentation with no new focal neurological dysfunction, patient was advised to proceed with abortive regimen of her nearly chronic daily headaches with Decadron 2 mg for 5 days, patient was offered preventive regimen with propranolol 10 mg twice daily increasing to 20 mg twice daily within 1 week  Patient has remote history of intermittent the he cardia with no underlying cardiac dysfunction reported otherwise    Abortive regimen of her headache should be considered gabapentin, I may offer Maxalt or sumatriptan as well, patient has been using ibuprofen and Tylenol with no significant benefits  Patient had evaluation by Rheumatology team with diagnosis fibromyalgia was established previously and gabapentin 100 mg at night was offered by Rheumatology for treatment  Patient described no other signs of rheumatological dysfunction today  Patient is to follow with headache team to establish her care for medical regimen adjustment  Subjective:  Headache, blurred vision bilaterally    HPI  Ms Agusto Simmons has presented to [facility] multiple [address] for follow-up on headaches with blurred vision  Patient described no significant improvement her headaches at the time of the Zonegran and Topamax trial   Patient stated ibuprofen Tylenol does not help with abortive regimen of her headache  Patient has blurriness left eye and right eye, no vision loss, no tinnitus, no facial asymmetry has been described otherwise  Patient stated she has intermittent tachycardia, non concerning with cardiology workup was completed and negative for POTS  Patient has established care with Rheumatology with diagnosis of fibromyalgia was established and patient was provided gabapentin 100 mg at night for treatment  Patient completed imaging of the brain in April 2021 with normal pituitary gland with a normal imaging has been described  Patient described vertigo started in July and Vertigo has been triggering headaches - ENT evaluation concluded patient has vestibular migraine; Patient has been performing Epley maneuver with her vertigo improves after the procedure at home; Patient has SNHL b/l which has progressed  Topamax 25 mg and meclizine 12 5 mg were provided for 1 month trial, patient was not able to tolerate topamax;  Cardiology and Ophthalmology evaluation has been completed; Patient has left eye weakness in adduction with MRA head advised in the light of SNHL and recent vertigo   ESR/CRP advised and negative  Patienknown small fiber neuropathy is stable, may worsen with initiation of topamax, and side effects overwhelmed the patient   - no new focal neurologic dysfunction reported  Evaluation for double vision was completed with no brainstem pathology, normal MRA head and negative ESR/CRP; work up for myasthenia gravis advised and negative; Vision therapy might be offered    - transient bouts of paresthesia - MRI brain with no intracranial pathology SPEP was negative;  - zonisamide 25 mg HS will be further advised for headache prevention - if no side effects reported, will be offering increasing dose to 50 mgHS in 7 days;  - patient is to continue meclizine   - patient is to continue working with [employer] to complete her work up for epilepsy  The following portions of the patient's history were reviewed and updated as appropriate:   She  has a past medical history of IBS (irritable bowel syndrome) and Kidney stones  She   Patient Active Problem List    Diagnosis Date Noted    Fibromyalgia muscle pain 06/29/2022    Migraine with aura and without status migrainosus, not intractable 06/29/2022    Sensorineural hearing loss (SNHL) of both ears 08/31/2021    Vertigo 08/31/2021    Vestibular migraine 08/31/2021    Double vision with both eyes open 08/31/2021    Paresthesias 05/20/2021    Blurred vision, bilateral 05/20/2021    Seizure-like activity ([address] ) 05/20/2021    Abnormal brain MRI 05/20/2021    Pituitary cyst ([facility] [address] ) 03/24/2021    Tachycardia 03/24/2021    Hypotension 03/24/2021    Small fiber neuropathy 03/24/2021     She  has a past surgical history that includes Cholecystectomy and Hip surgery (01/2022)  Her family history is not on file  She  reports that she has never smoked  She has never used smokeless tobacco  She reports current alcohol use  She reports that she does not use drugs    Current Outpatient Medications   Medication Sig Dispense Refill    CVS Senna 8 6 MG tablet  dexamethasone (DECADRON) 2 mg tablet Take 1 tablet (2 mg total) by mouth daily as needed (headache) 5 tablet 2    dicyclomine (BENTYL) 20 mg tablet Take 1 tablet (20 mg total) by mouth 2 (two) times a day 20 tablet 0    Esomeprazole Magnesium 20 MG TBEC Take 40 mg by mouth      gabapentin (NEURONTIN) 100 mg capsule Take 1 capsule (100 mg total) by mouth 3 (three) times a day 90 capsule 3    hyoscyamine (LEVSIN/SL) 0 125 mg SL tablet Take 0 125 mg by mouth 4 (four) times a day as needed      meclizine (ANTIVERT) 12 5 MG tablet TAKE 1 TABLET (12 5 MG TOTAL) BY MOUTH EVERY 12 (TWELVE) HOURS AS NEEDED FOR DIZZINESS (HEADACHE) 60 tablet 1    metoclopramide (REGLAN) 5 mg tablet Take 5 mg by mouth 4 (four) times a day Takes 4 times a day   ondansetron (ZOFRAN) 4 mg tablet TAKE 1 TABLET (4 MG TOTAL) BY MOUTH NIGHTLY AS NEEDED FOR NAUSEA OR VOMITING        polyethylene glycol (MIRALAX) 17 g packet Take 17 g by mouth as needed       Probiotic Product ([address] [place] [address]) CAPS Take by mouth      propranolol (INDERAL) 10 mg tablet Take 1 tablet (10 mg total) by mouth 2 (two) times a day In 1 week will increase to 2 tabs twice a day 120 tablet 5    Prucalopride Succinate 2 MG TABS Take 1 tablet by mouth daily      ACIDOPHILUS LACTOBACILLUS PO Take by mouth prn (Patient not taking: Reported on 6/29/2022)      dicyclomine (BENTYL) 10 mg capsule Take 10 mg by mouth      neomycin-polymyxin-dexamethasone (MAXITROL) ophthalmic suspension Administer 1 drop into the left eye 4 (four) times a day (Patient not taking: Reported on 6/29/2022) 5 mL 0    norethindrone (MICRONOR) 0 35 MG tablet daily (Patient not taking: Reported on 6/29/2022)      norethindrone-ethinyl estradiol-iron (MICROGESTIN FE1 5/30) 1 5-30 MG-MCG tablet Take 1 tablet by mouth daily (Patient not taking: No sig reported)      ondansetron (ZOFRAN) 4 mg tablet Take 1 tablet (4 mg total) by mouth every 8 (eight) hours as needed for nausea or vomiting for up to 5 days 8 tablet 0     No current facility-administered medications for this visit  Current Outpatient Medications on File Prior to Visit   Medication Sig    CVS Senna 8 6 MG tablet     dicyclomine (BENTYL) 20 mg tablet Take 1 tablet (20 mg total) by mouth 2 (two) times a day    Esomeprazole Magnesium 20 MG TBEC Take 40 mg by mouth    hyoscyamine (LEVSIN/SL) 0 125 mg SL tablet Take 0 125 mg by mouth 4 (four) times a day as needed    meclizine (ANTIVERT) 12 5 MG tablet TAKE 1 TABLET (12 5 MG TOTAL) BY MOUTH EVERY 12 (TWELVE) HOURS AS NEEDED FOR DIZZINESS (HEADACHE)    metoclopramide (REGLAN) 5 mg tablet Take 5 mg by mouth 4 (four) times a day Takes 4 times a day   ondansetron (ZOFRAN) 4 mg tablet TAKE 1 TABLET (4 MG TOTAL) BY MOUTH NIGHTLY AS NEEDED FOR NAUSEA OR VOMITING      polyethylene glycol (MIRALAX) 17 g packet Take 17 g by mouth as needed     Probiotic Product ([facility]) CAPS Take by mouth    Prucalopride Succinate 2 MG TABS Take 1 tablet by mouth daily    [DISCONTINUED] gabapentin (NEURONTIN) 100 mg capsule Take 100 mg by mouth every evening    [DISCONTINUED] zonisamide (Zonegran) 25 mg capsule Take 1 capsule (25 mg total) by mouth daily    ACIDOPHILUS LACTOBACILLUS PO Take by mouth prn (Patient not taking: Reported on 6/29/2022)    dicyclomine (BENTYL) 10 mg capsule Take 10 mg by mouth    neomycin-polymyxin-dexamethasone (MAXITROL) ophthalmic suspension Administer 1 drop into the left eye 4 (four) times a day (Patient not taking: Reported on 6/29/2022)    norethindrone (MICRONOR) 0 35 MG tablet daily (Patient not taking: Reported on 6/29/2022)    norethindrone-ethinyl estradiol-iron (MICROGESTIN FE1 5/30) 1 5-30 MG-MCG tablet Take 1 tablet by mouth daily (Patient not taking: No sig reported)    ondansetron (ZOFRAN) 4 mg tablet Take 1 tablet (4 mg total) by mouth every 8 (eight) hours as needed for nausea or vomiting for up to 5 days     No current facility-administered medications on file prior to visit  She is allergic to cyclobenzaprine, diazepam, fentanyl, medical tape, midazolam, prednisone, patricia - food allergy, and melatonin            Objective:    Blood pressure 115/69, pulse 80, temperature 98 1 °F (36 7 °C), temperature source Skin, height 4' 11" (1 499 m), weight 64 4 kg (142 lb)  Physical Exam    Neurological Exam  CONSTITUTIONAL: NAD, pleasant  NECK: supple, no lymphadenopathy, no thyromegaly, no JVD  CARDIOVASCULAR: RRR, normal S1S2, no murmurs, no rubs  RESP: clear to auscultation bilaterally, no wheezes/rhonchi/rales  ABDOMEN: soft, non tender, non distended  SKIN: no rash or skin lesions  EXTREMITIES: no edema, pulses 2+bilaterally  PSYCH: appropriate mood and affect  NEUROLOGIC COMPREHENSIVE EXAM: Patient is oriented to person, place and time, NAD; appropriate affect  CN II, III, IV, V, VI, VII,,IX,,X,XI-XII intact with EOMI, hearing loss b/l; PERRLA, OKN intact, VF grossly intact, fundi poorly visualized secondary to pupillary constriction; symmetric face noted  Motor: 5/5 UE/LE bilateral symmetric; Sensory: intact to light touch and pinprick bilaterally; normal vibration sensation feet bilaterally; Coordination within normal limits on FTN and XIOMARA testing; DTR: 2/4 through, no Babinski, no clonus  Tandem gait is intact  Romberg: absent  ROS:  12 points of review of system was reviewed with the patient and was unremarkable with exception: see HPI  Review of Systems   Constitutional: Negative  Negative for appetite change and fever  HENT: Negative  Negative for hearing loss, tinnitus, trouble swallowing and voice change  Eyes: Negative  Negative for photophobia and pain  Respiratory: Negative  Negative for shortness of breath  Cardiovascular: Negative  Negative for palpitations  Gastrointestinal: Negative  Negative for nausea and vomiting  Endocrine: Negative  Negative for cold intolerance     Genitourinary: Negative  Negative for dysuria, frequency and urgency  Musculoskeletal: Negative  Negative for myalgias and neck pain  Skin: Negative  Negative for rash  Neurological: Positive for headaches (worse)  Negative for dizziness, tremors, seizures, syncope, facial asymmetry, speech difficulty, weakness, light-headedness and numbness  Hematological: Negative  Does not bruise/bleed easily  Psychiatric/Behavioral: Negative  Negative for confusion, hallucinations and sleep disturbance

## 2022-07-22 DIAGNOSIS — G43.809 VESTIBULAR MIGRAINE: ICD-10-CM

## 2022-07-22 RX ORDER — PROPRANOLOL HYDROCHLORIDE 10 MG/1
10 TABLET ORAL 2 TIMES DAILY
Qty: 360 TABLET | Refills: 2 | Status: SHIPPED | OUTPATIENT
Start: 2022-07-22

## 2022-08-01 ENCOUNTER — PATIENT MESSAGE (OUTPATIENT)
Dept: NEUROLOGY | Facility: CLINIC | Age: 27
End: 2022-08-01

## 2022-08-03 ENCOUNTER — PATIENT MESSAGE (OUTPATIENT)
Dept: NEUROLOGY | Facility: CLINIC | Age: 27
End: 2022-08-03

## 2022-08-03 DIAGNOSIS — G43.109 MIGRAINE WITH AURA AND WITHOUT STATUS MIGRAINOSUS, NOT INTRACTABLE: Primary | ICD-10-CM

## 2022-08-03 RX ORDER — DIVALPROEX SODIUM 250 MG/1
TABLET, EXTENDED RELEASE ORAL
Qty: 8 TABLET | Refills: 1 | Status: SHIPPED | OUTPATIENT
Start: 2022-08-03

## 2022-08-04 DIAGNOSIS — G43.109 MIGRAINE WITH AURA AND WITHOUT STATUS MIGRAINOSUS, NOT INTRACTABLE: Primary | ICD-10-CM

## 2022-08-04 RX ORDER — KETOROLAC TROMETHAMINE 30 MG/ML
60 INJECTION, SOLUTION INTRAMUSCULAR; INTRAVENOUS ONCE
Status: COMPLETED | OUTPATIENT
Start: 2022-08-04 | End: 2022-08-09

## 2022-08-04 RX ORDER — PROCHLORPERAZINE EDISYLATE 5 MG/ML
10 INJECTION INTRAMUSCULAR; INTRAVENOUS ONCE
Status: COMPLETED | OUTPATIENT
Start: 2022-08-04 | End: 2022-08-09

## 2022-08-04 NOTE — PATIENT COMMUNICATION
Dr Royal Almanza - please place orders per note below  Ms Carlin Second,  Please consider taking Depakote 250 mg 2 tabs tonight, and if headache still ongoing, Ketorolac 60 mg Im injection and Compazine 10 mg IM injection can be done tomorrow at our office  Let me know

## 2022-08-08 NOTE — PATIENT COMMUNICATION
2022    Crawford Apgar, MD  to Me          6:12 PM   Ketorolac 60 mg IM and Compazine 10 mg IM is in Epic- please let the patient come to the office for injection;

## 2022-08-08 NOTE — PATIENT COMMUNICATION
Spoke w/patient  Advised her of ordered injections  Patient verbalized understanding  Scheduled patient for 8/9/22 @ 8am in [facility] office  Confirmed insurance, PCP and demographics        Thomas/Ying - appt < 72 hours

## 2022-08-09 ENCOUNTER — CLINICAL SUPPORT (OUTPATIENT)
Dept: NEUROLOGY | Facility: CLINIC | Age: 27
End: 2022-08-09
Payer: COMMERCIAL

## 2022-08-09 DIAGNOSIS — G43.709 CHRONIC MIGRAINE WITHOUT AURA: Primary | ICD-10-CM

## 2022-08-09 PROCEDURE — 96372 THER/PROPH/DIAG INJ SC/IM: CPT | Performed by: PSYCHIATRY & NEUROLOGY

## 2022-08-09 RX ADMIN — PROCHLORPERAZINE EDISYLATE 10 MG: 5 INJECTION INTRAMUSCULAR; INTRAVENOUS at 08:04

## 2022-08-09 RX ADMIN — KETOROLAC TROMETHAMINE 60 MG: 30 INJECTION, SOLUTION INTRAMUSCULAR; INTRAVENOUS at 08:07

## 2022-08-09 NOTE — PROGRESS NOTES
Patient came in today for a toradol and compazine injection, which both were given in the right deltoid per patient request  Patient tolerated both injections well with no issues

## 2022-09-23 ENCOUNTER — TELEPHONE (OUTPATIENT)
Dept: NEUROLOGY | Facility: CLINIC | Age: 27
End: 2022-09-23

## 2022-09-23 DIAGNOSIS — G43.109 MIGRAINE WITH AURA AND WITHOUT STATUS MIGRAINOSUS, NOT INTRACTABLE: Primary | ICD-10-CM

## 2022-09-23 RX ORDER — KETOROLAC TROMETHAMINE 10 MG/1
10 TABLET, FILM COATED ORAL EVERY 6 HOURS PRN
Qty: 10 TABLET | Refills: 1 | Status: SHIPPED | OUTPATIENT
Start: 2022-09-23

## 2022-09-23 RX ORDER — OLANZAPINE 2.5 MG/1
2.5 TABLET ORAL
Qty: 4 TABLET | Refills: 0 | Status: SHIPPED | OUTPATIENT
Start: 2022-09-23

## 2022-09-23 RX ORDER — PROCHLORPERAZINE MALEATE 10 MG
10 TABLET ORAL EVERY 6 HOURS PRN
Qty: 30 TABLET | Refills: 0 | Status: SHIPPED | OUTPATIENT
Start: 2022-09-23

## 2022-09-23 NOTE — TELEPHONE ENCOUNTER
Spoke w/patient  She reports migraines start in back right of head and migrate to front of head  Describes migraine as pressure and sharp stabbing pain in back of head  Rates pain as a 10/0-10 scale at it's worst   Reports intermittent blurry vision in right eye with migraines  Patient admits to photophobia and phonophobia  Denies nausea or vomiting  Propranolol 10mg - 2 tabs BID  Dexamethasone 2mg - prn (provides some relief for appx 1-2 hours)  Depakote 250mg - 1 @ HS  OTC Tylenol or Aleve - 1x daily    LOV - 6/29/22    Patient received Toradol and compazine injections 8/3/22     [phone] to leave detailed msg  Dr Lopez Repress - please advise

## 2022-09-23 NOTE — TELEPHONE ENCOUNTER
Ketorolac 10 mg and compazine 10 mg tabs provided; Olanzapine 2 5 mg Hs 4 doses provided;   Patient may stop at any office for Ketorolac 60 mg IM  injection in addition to compazine 10 mg IM injection if interested

## 2022-09-23 NOTE — TELEPHONE ENCOUNTER
Spoke w/patient  Advised her of note below  Patient verbalized understanding  She would like to start w/oral medications as she did not experience much relief with the shots last time

## 2022-09-23 NOTE — TELEPHONE ENCOUNTER
September 22, 2022             10:15 AM  Sivakumar Duarte RN routed this conversation to Neurology [facility] Clinical Team 3       Areta Sing  to Colleen Bauer MD          10:00 AM  Hello,     I just wanted to let you know my migraines started again for a three days in a row  Im on the medications you prescribed and their still continuing  Any ideas?

## 2022-09-26 NOTE — TELEPHONE ENCOUNTER
Sukhwinder Chung  to MD MIROSLAVA Bello      10:36 AM  Good morning,     I called and left a message  Im still having headaches  I continued to have them throughout the weekend with the medications prescribed and taken  Any other medications or anything I can try to relief the migraine and symptoms with it?

## 2022-09-26 NOTE — TELEPHONE ENCOUNTER
See below, pt left message stating she is still having migraines  Prescribed medications did not help  Asking if there are any further recommendations

## 2022-09-26 NOTE — TELEPHONE ENCOUNTER
In the light on normal appearing MRI brain and MRA head in 2021, patient would benefit from Migraine infusion treatment, if in-office Ketorolac 60 mg +compazine 10 mg did not work  Solumedrol 500 mg IV +Depakote 500 mg IV over 4 hours would be recommended      Patient is to follow with headache team for Emgality/Aimovig discussion

## 2022-09-27 NOTE — TELEPHONE ENCOUNTER
Pt made aware  She is agreeable to infusion and would prefer [facility] center  Pt has appt with Sharla Marrufo on 11/23/22 and is on wait list for sooner appt  Dr Bourgeois, to confirm- would you like to order IV Depacon (valproate)? If so, I received the following message: "Due to a national shortage, IV valproate is being reserved for treatment of epilepsy  Please consider an alternative agent for treating migraine "    Please advise

## 2022-09-27 NOTE — TELEPHONE ENCOUNTER
Jh Baird MD  to Pedro Gross PA-C  Neurology [facility] [employer] [employer] Team 4  Dewey Cover          9:47 AM   I greatly appreciate recommendations - I would appreciate if patient can be seeing today by Krishan Romano to define severity of her headache

## 2022-09-27 NOTE — TELEPHONE ENCOUNTER
Mo Anna - please guide me with the case as Depakote is not available at this point    What other options do we have in our practice to offer the patient with intractable at this point headache

## 2022-09-27 NOTE — TELEPHONE ENCOUNTER
I would not advise infusion without seeing the patient as risk and benefits of DHE must be discussed in the office and patient is not available for her visit      If headaches are worse - patient is to reach ER for further management

## 2022-09-27 NOTE — TELEPHONE ENCOUNTER
Can d/c depacon and use magnesium, DHE, steroid and PRN meds as noted in infusion which [facility] created as [employer]  I can place orders if you want  Melvin Tavera- if 12PM slot opens today I can see pt  Thanks

## 2022-09-27 NOTE — TELEPHONE ENCOUNTER
Called pt  She is not able to come in for appt today  I scheduled her for Friday 7:30am    Are we moving forward with infusion at this time or waiting until pt is seen on Friday?

## 2022-09-30 ENCOUNTER — OFFICE VISIT (OUTPATIENT)
Dept: NEUROLOGY | Facility: CLINIC | Age: 27
End: 2022-09-30
Payer: COMMERCIAL

## 2022-09-30 VITALS
HEIGHT: 59 IN | DIASTOLIC BLOOD PRESSURE: 62 MMHG | WEIGHT: 147.9 LBS | TEMPERATURE: 98.1 F | BODY MASS INDEX: 29.82 KG/M2 | HEART RATE: 78 BPM | SYSTOLIC BLOOD PRESSURE: 115 MMHG

## 2022-09-30 DIAGNOSIS — G43.709 CHRONIC MIGRAINE WITHOUT AURA WITHOUT STATUS MIGRAINOSUS, NOT INTRACTABLE: Primary | ICD-10-CM

## 2022-09-30 PROCEDURE — 99214 OFFICE O/P EST MOD 30 MIN: CPT | Performed by: PHYSICIAN ASSISTANT

## 2022-09-30 RX ORDER — NORTRIPTYLINE HYDROCHLORIDE 10 MG/1
10 CAPSULE ORAL
COMMUNITY

## 2022-09-30 NOTE — PATIENT INSTRUCTIONS
Wean propranolol now and stop- 1 tab BID x 1 week, then stop  We will call you about botox authorization

## 2022-10-02 PROBLEM — G43.709 CHRONIC MIGRAINE WITHOUT AURA WITHOUT STATUS MIGRAINOSUS, NOT INTRACTABLE: Status: ACTIVE | Noted: 2022-10-02

## 2022-10-03 ENCOUNTER — TELEPHONE (OUTPATIENT)
Dept: NEUROLOGY | Facility: CLINIC | Age: 27
End: 2022-10-03

## 2022-10-03 NOTE — TELEPHONE ENCOUNTER
Patient is a new start for Botox  Patient prior authorization has been initiated via form/fax to insurance  Will continue to follow up for approval/deterimination determination

## 2022-10-05 ENCOUNTER — TELEPHONE (OUTPATIENT)
Dept: NEUROLOGY | Facility: CLINIC | Age: 27
End: 2022-10-05

## 2022-10-05 NOTE — TELEPHONE ENCOUNTER
Contacted patient insurance;[employer]/ [place], spoke to Poonam Long and advised that the Prior Authorization has been approved     Will be in contact with patient and Srinath regarding approval     Approved   Botox 200 UNITS   Auth # [id number]  Valid: 10/4/2022-10/3/2023  Visits 4    [place]

## 2022-10-05 NOTE — TELEPHONE ENCOUNTER
Contacted [place] pharmacy, spoke to [place]; patient account setup, transferred to [facility] (Pharmacist) verbal order given; Botox 200 UNITS, Qty 1, with refills of 3  Will continue follow up upon completion of patient account to set up delivery

## 2022-10-06 NOTE — TELEPHONE ENCOUNTER
Contacted [facility], spoke to [facility] [place] and she advised the pt's current account is under review and has expedited the process and should be completed by Monday  Will follow up on this account on Monday

## 2022-10-11 NOTE — TELEPHONE ENCOUNTER
Contacted [facility], spoke to [facility], she advised that the Botox is ready to ship  May we please contact to schedule KADE appt  Botox 200 UNITS   Scheduled: 10/12/2022  Location: [facility]  Via: [employer]    Please advise if medication does not arrive

## 2022-10-12 NOTE — TELEPHONE ENCOUNTER
Botox number of units: 200  Botox quantity: 1  Arrived at what location: CV  Botox at [facility]: yes  NDC number: 8690-4802-85  Lot number: [id number]   Expiration Date: 2025/03  Appt notes indicate correct medication: yes

## 2022-10-20 ENCOUNTER — TELEPHONE (OUTPATIENT)
Dept: NEUROLOGY | Facility: CLINIC | Age: 27
End: 2022-10-20

## 2022-10-20 NOTE — TELEPHONE ENCOUNTER
Called pt to schedule Botox appointment, no answer  Left voicemail for patient to call back to schedule

## 2022-10-20 NOTE — TELEPHONE ENCOUNTER
Patient left  stating [address] needs new rx for Nurtec  Per chart review, script for [address]  Spoke w/[facility]  He advised that Nurtec requires PA

## 2022-10-20 NOTE — TELEPHONE ENCOUNTER
Patient called is checking if we received the botox medications  She would like to set up an appointment   Please contact patient at [phone]

## 2022-10-28 NOTE — TELEPHONE ENCOUNTER
I called insurance to clarify denial and was told to start new case, which I did verbally, future scripts: [phone]; determination pending

## 2022-11-01 NOTE — TELEPHONE ENCOUNTER
Denial letter recd 10/28 reason:  Less than 15 headache days/month  Called future scripts and resubmitted verbally phone [phone]  Insurance/pharmacist said because patient has greater than 15 headache days/month, nurtec is denied  I scheduled peer to peer and will receive a call from the medical director within 3 business days in the AM     I also called patient do advise and ask if she tried triptans in the past; she does have hx of tachycardia though  Reached , left message tcb

## 2022-11-02 NOTE — TELEPHONE ENCOUNTER
Peer to peer completed with Dr Margaret Lombard, nurtec approved, will be faxing authorization  Patient aware

## 2022-11-03 DIAGNOSIS — G43.809 VESTIBULAR MIGRAINE: ICD-10-CM

## 2022-11-03 DIAGNOSIS — M79.7 FIBROMYALGIA MUSCLE PAIN: ICD-10-CM

## 2022-11-03 DIAGNOSIS — G43.109 MIGRAINE WITH AURA AND WITHOUT STATUS MIGRAINOSUS, NOT INTRACTABLE: ICD-10-CM

## 2022-11-03 RX ORDER — GABAPENTIN 100 MG/1
CAPSULE ORAL
Qty: 90 CAPSULE | Refills: 3 | Status: SHIPPED | OUTPATIENT
Start: 2022-11-03

## 2022-11-23 ENCOUNTER — OFFICE VISIT (OUTPATIENT)
Dept: NEUROLOGY | Facility: CLINIC | Age: 27
End: 2022-11-23

## 2022-11-23 VITALS
SYSTOLIC BLOOD PRESSURE: 117 MMHG | HEART RATE: 73 BPM | TEMPERATURE: 97.3 F | HEIGHT: 59 IN | DIASTOLIC BLOOD PRESSURE: 76 MMHG | BODY MASS INDEX: 28.51 KG/M2 | WEIGHT: 141.4 LBS

## 2022-11-23 DIAGNOSIS — G43.809 VESTIBULAR MIGRAINE: ICD-10-CM

## 2022-11-23 DIAGNOSIS — G43.109 MIGRAINE WITH AURA AND WITHOUT STATUS MIGRAINOSUS, NOT INTRACTABLE: ICD-10-CM

## 2022-11-23 DIAGNOSIS — G43.709 CHRONIC MIGRAINE WITHOUT AURA WITHOUT STATUS MIGRAINOSUS, NOT INTRACTABLE: Primary | ICD-10-CM

## 2022-11-23 RX ORDER — OLANZAPINE 5 MG/1
5 TABLET ORAL
Qty: 5 TABLET | Refills: 0 | Status: SHIPPED | OUTPATIENT
Start: 2022-11-23 | End: 2022-11-28

## 2022-11-23 RX ORDER — LEVOTHYROXINE SODIUM 0.05 MG/1
50 TABLET ORAL DAILY
COMMUNITY
Start: 2022-11-11

## 2022-11-23 NOTE — PATIENT INSTRUCTIONS
Chronic migraine without aura without status migrainosus, not intractable  Pt reports worsening migraines and blurred vision over the past month  She was recently diagnosed with Hashimoto thyroiditis and is taking levothyroxine  She has bloodwork in the coming weeks  She is taking Nurtec as needed for migraines which is helpful but does not fully alleviate the migraine  Taking Nurtec for prevention as well as for acute migraines was discussed  She will take Nurtec every other day  If pt gets a migraine on the days not scheduled to take the pill, pt may take it then skip the next day  She is scheduled for Botox injections in December  Hopefully after receiving Botox injections she will be able to go back to using Nurtec as needed  I have spent 60 minutes with Patient  today in which greater than 50% of this time was spent in counseling/coordination of care regarding Diagnostic results, Prognosis, Risks and benefits of tx options, Intructions for management, Patient and family education, Importance of tx compliance, Risk factor reductions and Impressions  She will follow-up as scheduled with [facility]  Migraine with aura and without status migrainosus, not intractable  Pt has been having more frequent migraines  She has also had an increase in blurred vision  She had a brain MRI in which a pituitary cyst was seen but follow-up imaging did not show the cyst   A brain MRI will be repeated at this time  She will also take olanzapine 5mg at bedtime for 5 nights to break the current migraine  Side effects were reviewed

## 2022-11-23 NOTE — ASSESSMENT & PLAN NOTE
· Pt has been having more frequent migraines  · She has also had an increase in blurred vision  · She had a brain MRI in which a pituitary cyst was seen but follow-up imaging did not show the cyst   · A brain MRI will be repeated at this time  · She will also take olanzapine 5mg at bedtime for 5 nights to break the current migraine  Side effects were reviewed

## 2022-11-23 NOTE — ASSESSMENT & PLAN NOTE
· Pt reports worsening migraines and blurred vision over the past month  She was recently diagnosed with Hashimoto thyroiditis and is taking levothyroxine  She has bloodwork in the coming weeks  · She is taking Nurtec as needed for migraines which is helpful but does not fully alleviate the migraine  · Taking Nurtec for prevention as well as for acute migraines was discussed  She will take Nurtec every other day  If pt gets a migraine on the days not scheduled to take the pill, pt may take it then skip the next day  · She is scheduled for Botox injections in December  Hopefully after receiving Botox injections she will be able to go back to using Nurtec as needed  I have spent 60 minutes with Patient  today in which greater than 50% of this time was spent in counseling/coordination of care regarding Diagnostic results, Prognosis, Risks and benefits of tx options, Intructions for management, Patient and family education, Importance of tx compliance, Risk factor reductions and Impressions  She will follow-up as scheduled with [facility]

## 2022-11-23 NOTE — PROGRESS NOTES
Review of Systems   Constitutional: Negative  Negative for appetite change and fever  HENT: Negative  Negative for hearing loss, tinnitus, trouble swallowing and voice change  Eyes: Positive for visual disturbance  Negative for photophobia and pain  Respiratory: Negative  Negative for shortness of breath  Cardiovascular: Negative  Negative for palpitations  Gastrointestinal: Negative  Negative for nausea and vomiting  Endocrine: Positive for heat intolerance  Negative for cold intolerance  Genitourinary: Negative  Negative for dysuria, frequency and urgency  Musculoskeletal: Negative  Negative for gait problem, myalgias and neck pain  Skin: Negative  Negative for rash  Allergic/Immunologic: Negative  Neurological: Positive for headaches  Negative for dizziness, tremors, seizures, syncope, facial asymmetry, speech difficulty, weakness, light-headedness and numbness  Hematological: Negative  Does not bruise/bleed easily  Psychiatric/Behavioral: Negative  Negative for confusion, hallucinations and sleep disturbance  All other systems reviewed and are negative
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
## 2022-12-19 ENCOUNTER — PROCEDURE VISIT (OUTPATIENT)
Dept: NEUROLOGY | Facility: CLINIC | Age: 27
End: 2022-12-19

## 2022-12-19 VITALS
HEART RATE: 101 BPM | TEMPERATURE: 97.3 F | BODY MASS INDEX: 28.56 KG/M2 | HEIGHT: 59 IN | SYSTOLIC BLOOD PRESSURE: 136 MMHG | DIASTOLIC BLOOD PRESSURE: 80 MMHG

## 2022-12-19 DIAGNOSIS — G43.709 CHRONIC MIGRAINE WITHOUT AURA WITHOUT STATUS MIGRAINOSUS, NOT INTRACTABLE: Primary | ICD-10-CM

## 2022-12-19 NOTE — PROGRESS NOTES
Universal Protocol   Consent: Verbal consent obtained  Written consent obtained    Risks and benefits: risks, benefits and alternatives were discussed  Consent given by: patient  Patient understanding: patient states understanding of the procedure being performed  Patient consent: the patient's understanding of the procedure matches consent given  Procedure consent: procedure consent matches procedure scheduled        Chemodenervation     Date/Time 12/19/2022 9:55 AM     Performed by  Hilton Omalley PA-C     Authorized by Hilton Omalley PA-C        Pre-procedure details      Prepped With: Alcohol     Procedure details     Position:  Upright   Botox     Botox Type:  Type A    Brand:  Botox    mL's of Botulinum Toxin:  175    Final Concentration per CC:  100 units    Needle Gauge:  30 G 2 5 inch   Procedures     Botox Procedures: chronic headache      Indications: migraines     Injection Location      Head / Face:  L superior trapezius, R superior trapezius, L superior cervical paraspinal, R superior cervical paraspinal, L , R , procerus, L temporalis, R temporalis, R frontalis, L frontalis, R medial occipitalis and L medial occipitalis    L  injection amount:  5 unit(s)    R  injection amount:  5 unit(s)    L lateral frontalis:  5 unit(s)    R lateral frontalis:  5 unit(s)    L medial frontalis:  5 unit(s)    R medial frontalis:  5 unit(s)    L temporalis injection amount:  20 unit(s)    R temporalis injection amount:  20 unit(s)    Procerus injection amount:  5 unit(s)    L medial occipitalis injection amount:  15 unit(s)    R medial occipitalis injection amount:  15 unit(s)    L superior cervical paraspinal injection amount:  10 unit(s)    R superior cervical paraspinal injection amount:  10 unit(s)    L superior trapezius injection amount:  15 unit(s)    R superior trapezius injection amount:  15 unit(s)   Total Units     Total units used:  175    Total units discarded: 25   Post-procedure details      Chemodenervation:  Chronic migraine    Facial Nerve Location[de-identified]  Bilateral facial nerve    Patient tolerance of procedure: Tolerated well, no immediate complications   Comments      Extra units medically necessary- 20 R occipitalis (behind ear/ suboccipitalis region)  Lidocaine spray used on traps and forehead  Blood pressure 136/80, pulse 101, temperature (!) 97 3 °F (36 3 °C), temperature source Temporal, height 4' 11" (1 499 m)  This is her first botox, she tolerated this well  No complications  Pt recently dx with hashimoto's thyroiditis, which likely contributes to increase in headaches  Pt to continue f/u with endo, on levothyroxine  Nurtec helps, but we are hoping botox reduces frequency of migraines moving fwd

## 2022-12-23 ENCOUNTER — HOSPITAL ENCOUNTER (OUTPATIENT)
Dept: RADIOLOGY | Facility: HOSPITAL | Age: 27
Discharge: HOME | End: 2022-12-23

## 2022-12-23 DIAGNOSIS — G43.109 MIGRAINE WITH AURA AND WITHOUT STATUS MIGRAINOSUS, NOT INTRACTABLE: ICD-10-CM

## 2022-12-30 ENCOUNTER — CLINICAL SUPPORT (OUTPATIENT)
Dept: NEUROLOGY | Facility: CLINIC | Age: 27
End: 2022-12-30

## 2022-12-30 DIAGNOSIS — G43.909 MIGRAINES: Primary | ICD-10-CM

## 2022-12-30 RX ADMIN — KETOROLAC TROMETHAMINE 60 MG: 30 INJECTION, SOLUTION INTRAMUSCULAR; INTRAVENOUS at 15:44

## 2022-12-30 NOTE — PROGRESS NOTES
Injected patient in the right buttocks with 60mg's of toradol  Patient tolerated it and there was no redness or swelling at the injection site

## 2023-01-06 ENCOUNTER — TELEPHONE (OUTPATIENT)
Dept: NEUROLOGY | Facility: CLINIC | Age: 28
End: 2023-01-06

## 2023-01-06 DIAGNOSIS — G43.709 CHRONIC MIGRAINE WITHOUT AURA WITHOUT STATUS MIGRAINOSUS, NOT INTRACTABLE: Primary | ICD-10-CM

## 2023-01-06 RX ORDER — DIVALPROEX SODIUM 250 MG/1
TABLET, DELAYED RELEASE ORAL
Qty: 12 TABLET | Refills: 1 | Status: SHIPPED | OUTPATIENT
Start: 2023-01-06

## 2023-01-06 NOTE — TELEPHONE ENCOUNTER
Pt contacted me re: ongoing headaches since botox injections  Tried/ failed so far-  Decadron- helps but causing nausea  toradol injection in office last week  Olanzapine  Zofran, reglan  nurtec    Trying to avoid NSAIDs due to GI sxs/ concerns  Sent depakote taper for her to try  Alternative- she can increase gabapentin 300 mg qhs x 5 days

## 2023-01-27 ENCOUNTER — CLINICAL SUPPORT (OUTPATIENT)
Dept: NEUROLOGY | Facility: CLINIC | Age: 28
End: 2023-01-27

## 2023-01-27 DIAGNOSIS — G43.709 CHRONIC MIGRAINE WITHOUT AURA WITHOUT STATUS MIGRAINOSUS, NOT INTRACTABLE: ICD-10-CM

## 2023-01-27 DIAGNOSIS — G43.709 CHRONIC MIGRAINE WITHOUT AURA WITHOUT STATUS MIGRAINOSUS, NOT INTRACTABLE: Primary | ICD-10-CM

## 2023-01-27 RX ORDER — PROCHLORPERAZINE EDISYLATE 5 MG/ML
10 INJECTION INTRAMUSCULAR; INTRAVENOUS ONCE
Status: COMPLETED | OUTPATIENT
Start: 2023-01-27 | End: 2023-01-27

## 2023-01-27 RX ORDER — KETOROLAC TROMETHAMINE 30 MG/ML
60 INJECTION, SOLUTION INTRAMUSCULAR; INTRAVENOUS ONCE
Status: COMPLETED | OUTPATIENT
Start: 2023-01-27 | End: 2023-01-27

## 2023-01-27 RX ADMIN — PROCHLORPERAZINE EDISYLATE 10 MG: 5 INJECTION INTRAMUSCULAR; INTRAVENOUS at 14:56

## 2023-01-27 RX ADMIN — KETOROLAC TROMETHAMINE 60 MG: 30 INJECTION, SOLUTION INTRAMUSCULAR; INTRAVENOUS at 14:55

## 2023-01-27 NOTE — PROGRESS NOTES
Pt presented to the clinic for a Toradol 60mg which was given on the Right buttocks & a 10mg Compazine which was given on the left buttocks  No redness or irritation  Pt will call if needed

## 2023-02-02 ENCOUNTER — TELEPHONE (OUTPATIENT)
Dept: NEUROLOGY | Facility: CLINIC | Age: 28
End: 2023-02-02

## 2023-02-02 NOTE — TELEPHONE ENCOUNTER
Pt dropped off her FMLA forms to be filled out, dropped charges for $30 and collected  Scanned documents into

## 2023-02-08 ENCOUNTER — TELEPHONE (OUTPATIENT)
Dept: NEUROLOGY | Facility: CLINIC | Age: 28
End: 2023-02-08

## 2023-02-08 DIAGNOSIS — G43.709 CHRONIC MIGRAINE WITHOUT AURA WITHOUT STATUS MIGRAINOSUS, NOT INTRACTABLE: Primary | ICD-10-CM

## 2023-02-08 RX ORDER — GALCANEZUMAB 120 MG/ML
INJECTION, SOLUTION SUBCUTANEOUS
Qty: 1 ML | Refills: 11 | Status: SHIPPED | OUTPATIENT
Start: 2023-02-08 | End: 2023-02-15 | Stop reason: CLARIF

## 2023-02-08 RX ORDER — GALCANEZUMAB 120 MG/ML
INJECTION, SOLUTION SUBCUTANEOUS
Qty: 2 ML | Refills: 0 | Status: SHIPPED | OUTPATIENT
Start: 2023-02-08 | End: 2023-02-15 | Stop reason: CLARIF

## 2023-02-08 NOTE — TELEPHONE ENCOUNTER
----- Message from Arnol Elder PA-C sent at 2/8/2023  7:37 AM EST -----  Please start PA for emgality, I placed the orders  Thank you!!  ----- Message -----  From: Flores Zambrano RN  Sent: 1/31/2023   2:28 PM EST  To: Arnol Elder PA-C    Please let me know when you place emgality and we will submit the PA, thanks!  ----- Message -----  From: Arnol Elder PA-C  Sent: 1/30/2023   8:24 AM EST  To: Neurology [place] Clinical Team 4    Good morning,  Wondering if emgality could get approved via PA? Thanks

## 2023-02-09 ENCOUNTER — TELEPHONE (OUTPATIENT)
Dept: NEUROLOGY | Facility: CLINIC | Age: 28
End: 2023-02-09

## 2023-02-09 NOTE — TELEPHONE ENCOUNTER
Message received from Brandon Hunt from [employer] ,looking to get update on prior authorization status for this pt for Nurtec 75 mg     CB# [phone]

## 2023-02-10 NOTE — TELEPHONE ENCOUNTER
PA for nurtec was approved through 10/28/2024  (scanned to media dated 11/2/2022)    I called [employer] but was transferred several times and long hold time; unable to continue to hold    I will check patient's insurance; may have changed insurance

## 2023-02-13 ENCOUNTER — TELEPHONE (OUTPATIENT)
Dept: NEUROLOGY | Facility: CLINIC | Age: 28
End: 2023-02-13

## 2023-02-13 NOTE — TELEPHONE ENCOUNTER
Called pt to verify updated medical insurance information  Pt has confirmed that her new medical insurance is: [employer]  Member ID# [id number]  Bin# [id number]  Ph: [phone]     Pt states that her plan started approx  January 10, 2023

## 2023-02-15 ENCOUNTER — TELEPHONE (OUTPATIENT)
Dept: NEUROLOGY | Facility: CLINIC | Age: 28
End: 2023-02-15

## 2023-02-15 NOTE — TELEPHONE ENCOUNTER
Andre MEJIAS submitted via [facility]    Key: [id number]     Approval pending

## 2023-02-15 NOTE — TELEPHONE ENCOUNTER
Spoke to Oumar Kapoor, aware Dorian Schaumburg was approved as a preventative medication therefore please submit nurtec as abortive, and change qty to 8

## 2023-02-15 NOTE — TELEPHONE ENCOUNTER
PA maryam Powell has been approved via [facility]    Key: [id number]       Approvedtoday  Request Reference Number: [id number]  Mandeep Smith 225/1 5 is approved through 08/15/2023  Please refer to the fax or electronic case notice for further information

## 2023-02-15 NOTE — TELEPHONE ENCOUNTER
Nurtec was denied with [facility] with current med order for the following reasons: We have denied coverage of the following medication that you or your physician requested: Nurtec  We denied the request as not medically necessary because the information provided to us indicates that your request for Nurtec does not meet the applicable medical necessity pharmacy criteria as outlined in our pharmacy policy titled, Migraine and Headache Agents  No documentation is provided of the following:     (1) The medication will not be used for preventative treatment of migraine  (2) You have fewer than 15 headache days per month  (3) You have an inadequate response or inability to tolerate two triptans (for example: eletriptan, rizatriptan, sumatriptan)  This medication is approved when the pharmacy policy medical necessity criteria as listed below are met:     (1) You have a diagnosis of migraine with or without aura  (2) The medication will be used for the acute treatment of migraine  (3) The medication will not be used for preventative treatment of migraine  (4) You have fewer than 15 headache days per month  (5) You are 25years of age or older  [facility]-[id number]     (6) You have an inadequate response or inability to tolerate two triptans (for example: eletriptan, rizatriptan, sumatriptan)  (7) If you have four or more headache days per month, you must be on current treatment or have inability to tolerate one of the following: (A) Elavil (amitriptyline) or Effexor (venlafaxine)  (B) Depakote/Depakote ER (divalproex sodium) or Topamax (topiramate)  (C) Beta blocker (that is, atenolol, propranolol, nadolol, timolol, or metoprolol)       (8) The medication is prescribed by or in consultation with a neurologist, pain specialist, headache specialist certified by the [employer] of Neurologic Subspecialties     (9) Medication will not be used in combination with another oral calcitonin gene-related peptide calcitonin gene-related peptide (CGRP) inhibitor  (10) Lasmiditan (Reyvow) only: inadequate response or inability to tolerate rimegepant (Nurtec ODT) and ubrogepan Carynbrady Castorena)       The decision was made by Cristina LEAL    Please advise, thank you

## 2023-02-17 ENCOUNTER — TELEPHONE (OUTPATIENT)
Dept: NEUROLOGY | Facility: CLINIC | Age: 28
End: 2023-02-17

## 2023-02-17 NOTE — TELEPHONE ENCOUNTER
----- Message from Carmen Ferrell RN sent at 2/17/2023  7:59 AM EST -----  Regarding: FW: STD Forms Completed  Contact: [phone]    ----- Message -----  From: Kimberly Aparicio  Sent: 2/16/2023   6:34 PM EST  To: Neurology [address] Clinical Team 5  Subject: STD Forms Completed                              Hello    Thank you  Can you tell Jonathan escoto needs another pre authorization sent due to new insurance card…  They need a new one sent  Thank you

## 2023-02-17 NOTE — TELEPHONE ENCOUNTER
Determination pending; I called patient to advise; reached vm, left detailed message  I also let her know Martha Joya was approved

## 2023-02-17 NOTE — TELEPHONE ENCOUNTER
I called patient to discuss prior authorizations  Reached vm; left detailed message that her insurance denied emgality as ajovy on formulary; we submitted for ajovy and was approved; is a monthly injectable  I also let her know that nurtec was denied and we resubmitted; waiting determination

## 2023-02-17 NOTE — TELEPHONE ENCOUNTER
I called patient to discuss prior authorizations in response to her my chart message  Nilay Pruitt Reached vm; left detailed message that her insurance denied emgality as ajovy on formulary; we submitted for ajovy and was approved; is a monthly injectable  I also let her know that nurtec was denied and we resubmitted; waiting determination

## 2023-02-20 ENCOUNTER — TELEPHONE (OUTPATIENT)
Dept: NEUROLOGY | Facility: CLINIC | Age: 28
End: 2023-02-20

## 2023-02-20 NOTE — TELEPHONE ENCOUNTER
Karen my chart message:    Gabriel Rojas  to [address] Team 5 (supporting Germain Jon)      11:11 AM  Hello,     I tired to call but the number won’t go through  I need [facility] Em[facility]ality or Whatever  the medication is  [facility] didn[facility]t [facility] until yesterday  That’s why they need new pre authorizes for any medications moving forward they said  I need a pre authorization as well for gabapentin to be able to refill it   Thank you

## 2023-02-20 NOTE — TELEPHONE ENCOUNTER
I spoke to pharmacy who confirmed gabapentin was filled 2/4 and does not need a PA  Patient aware  Evelio Armenta was approved for ID [id number] thrugh 8/15/2023;  [facility]    I spoke to pharmacy again however still rejecting; I called insurance [phone], reference #[id number] and they confirmed Evelio Armenta was approved and was able to run a trial claim; I called the pharmacy again and advised; went through fine; they said patient was in the pharmacy and will update patient

## 2023-02-20 NOTE — TELEPHONE ENCOUNTER
----- Message from Samson Christiansen RN sent at 2/17/2023 11:25 AM EST -----  Regarding: FW: STD Forms Completed  Contact: [phone]    ----- Message -----  From: Jamaal Alvarez  Sent: 2/17/2023  11:11 AM EST  To: Neurology [address] Clinical Team 5  Subject: STD Forms Completed                              Hello,    I tired to call but the number won’t go through  I [facility] or Whatever  the medication is  [facility] until yesterday  That’s why they need new pre authorizes for any medications moving forward they said  I need a pre authorization as well for gabapentin to be able to refill it  Thank you

## 2023-02-22 ENCOUNTER — TELEPHONE (OUTPATIENT)
Dept: NEUROLOGY | Facility: CLINIC | Age: 28
End: 2023-02-22

## 2023-02-22 NOTE — TELEPHONE ENCOUNTER
Pt left message   I took my first shot of Ajovy ,and I have a little bit of swelling around the site,but then now I have a rash starting and it hurts  CB# [phone]    Called pt back  Reported that  she took the  Ajovy at 11am this morning for the first time  Little swelling, rash on that side of the abdomen and it hurts, stated that she took Benadryl 25 mg about a half hour ago,denies any other symptoms    Stated she will send picture via my chart  CB# [phone]    Please advise

## 2023-02-23 NOTE — TELEPHONE ENCOUNTER
I contacted pt and told her to ice, take benadryl, apply hydrocortisone if needed  Call back if not improved after 12-24 hours

## 2023-02-24 ENCOUNTER — OFFICE VISIT (OUTPATIENT)
Dept: NEUROLOGY | Facility: CLINIC | Age: 28
End: 2023-02-24

## 2023-02-24 VITALS
DIASTOLIC BLOOD PRESSURE: 60 MMHG | TEMPERATURE: 97.3 F | WEIGHT: 138 LBS | HEART RATE: 88 BPM | SYSTOLIC BLOOD PRESSURE: 117 MMHG | BODY MASS INDEX: 27.87 KG/M2

## 2023-02-24 DIAGNOSIS — G43.809 VESTIBULAR MIGRAINE: ICD-10-CM

## 2023-02-24 DIAGNOSIS — G47.8 UNREFRESHED BY SLEEP: ICD-10-CM

## 2023-02-24 DIAGNOSIS — G43.709 CHRONIC MIGRAINE WITHOUT AURA WITHOUT STATUS MIGRAINOSUS, NOT INTRACTABLE: Primary | ICD-10-CM

## 2023-02-24 RX ORDER — DEXAMETHASONE 1 MG
TABLET ORAL
Qty: 10 TABLET | Refills: 2 | Status: SHIPPED | OUTPATIENT
Start: 2023-02-24
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
## 2023-02-24 NOTE — PATIENT INSTRUCTIONS
Maxalt/ rizatriptan- consider for as needed  Decadron- 1 mg 1 day prior to botox, day of botox and 1 day after botox  Add benadryl at night on day of botox  Headache management instructions  - When patient has a moderate to severe headache, they should seek rest, initiate relaxation and apply cold compresses to the head  - Maintain regular sleep schedule  Adults need at least 7-8 hours of uninterrupted a night  - Limit over the counter medications such as Tylenol, Ibuprofen, Aleve, Excedrin  (No more than 2- 3 times a week or max 10 a month)  - Maintain headache diary  Free KARLA for a smart phone, which can be used is "Migraine buddy"  - Limit caffeine to 1-2 cups 8 to 16 oz a day or less  - Avoid dietary trigger  (aged cheese, peanuts, MSG, aspartame and nitrates)  - Patient is to have regular frequent meals to prevent headache onset  - Please drink at least 64 ounces of water a day to help remain hydrated

## 2023-02-28 PROBLEM — E23.6 PITUITARY CYST (HCC): Status: RESOLVED | Noted: 2021-03-24 | Resolved: 2023-02-28

## 2023-03-02 DIAGNOSIS — G43.109 MIGRAINE WITH AURA AND WITHOUT STATUS MIGRAINOSUS, NOT INTRACTABLE: ICD-10-CM

## 2023-03-02 DIAGNOSIS — M79.7 FIBROMYALGIA MUSCLE PAIN: ICD-10-CM

## 2023-03-02 DIAGNOSIS — G43.809 VESTIBULAR MIGRAINE: ICD-10-CM

## 2023-03-02 RX ORDER — GABAPENTIN 100 MG/1
CAPSULE ORAL
Qty: 90 CAPSULE | Refills: 3 | Status: SHIPPED | OUTPATIENT
Start: 2023-03-02

## 2023-03-03 ENCOUNTER — TELEPHONE (OUTPATIENT)
Dept: SLEEP CENTER | Facility: CLINIC | Age: 28
End: 2023-03-03

## 2023-03-03 NOTE — TELEPHONE ENCOUNTER
----- Message from Terrell Junior MD sent at 3/2/2023 10:01 PM EST -----  Approved    ----- Message -----  From: Karolina Jaime  Sent: 6/13/4759  10:56 AM EST  To: Sleep Medicine [facility] Provider    This home sleep study needs approval      If approved please sign and return to clerical pool  If denied please include reasons why  Also provide alternative testing if warranted  Please sign and return to clerical pool

## 2023-03-06 ENCOUNTER — TELEPHONE (OUTPATIENT)
Dept: NEUROLOGY | Facility: CLINIC | Age: 28
End: 2023-03-06

## 2023-03-06 NOTE — TELEPHONE ENCOUNTER
Contacted [facility] spoke to Alan that advised there is not PA on file for pt  Writer advised a PA has been on file and initiated with [employer] name on the PA since 10/2/2023, and a delivery has been made on 10/12/2022 by [facility]  She advised will submit the information given and place in ir for [facility] and see what they return with because they contacted them on 2/27 as advised PA not on file  Will follow up with pt plan regarding this information

## 2023-03-08 NOTE — TELEPHONE ENCOUNTER
[address] spoke to Kristi that stated pa is needed pt's current auth on file is under previous plan and needs a new pa under new plan  New PA sent to pt plan via fax/form for patient     Please be advised pt appt is 3/20/2023

## 2023-03-09 ENCOUNTER — TELEPHONE (OUTPATIENT)
Dept: NEUROLOGY | Facility: CLINIC | Age: 28
End: 2023-03-09

## 2023-03-09 NOTE — TELEPHONE ENCOUNTER
Contacted pt plan, spoke to [facility] that advised pt renewal has been denied due to documentation not showing any improvement with Botox that documentation is needed and to be resent in order to renew  Please advise if since starting Botox injections pt has had a positive response and decrease in migraines so I may submit this information to the pt plan       Please be advised pt appt is on 3/20/2023    Thank you

## 2023-03-13 NOTE — TELEPHONE ENCOUNTER
Pt new pa for new insurance sent; denied  All parties aware pt has upcoming appt 3/20/2023 and denial response  No documentation of improvement with first injection, please be advised upon request for assistance and pt in agreeance with resuming treatment  Writer faxed addition documentation for request for botox as written;Patient is new to Botox injections as 12/19/2022 was patients first injection  Patient has been recently diagnosed with Hashimoto’s thyroiditis, which likely contributes to increase in headaches  Patient care plan is to resume Botox tx’s and achieve a positive response with greater than at least 50% reduction of migraine headache hours from baseline and at least 7 migraine days with less abortive medication, decrease in pain as currently she has had less ER visits which correlates to headache diary  As this has been her first does and looking to achieve a greater response with following Botox injections  Do you agree with the information sent? Please advise as will await approval/denial redetermination

## 2023-03-14 NOTE — TELEPHONE ENCOUNTER
That is fine  Also we can see pt in person again to confirm if second injection helped  In general it is more common for second injection to work better

## 2023-03-15 NOTE — TELEPHONE ENCOUNTER
received -Hi, my name is Adan Hobbs, i just got off the phone with the pharmacy for my botox, and they still can't send it because apparently i need a pre authorization sent in for it  If you guys can send it, they can go ahead and try to submit it and get it to the office before my monday morning appointment  If not, they are not gonna be able to send it and it won't be in by Monday for botox  So if you could give me a call back at [phone]  That would be great  Thank you   Jonathan

## 2023-03-15 NOTE — TELEPHONE ENCOUNTER
Contacted perform , VO given to [facility]( Pharmacist)  Botox 200 UNITS  Qty  1  [phone]     Do you authorize Verbal Order  Thank you

## 2023-03-16 NOTE — TELEPHONE ENCOUNTER
0900 contacted [employer],, spoke to Rosemary Garcia that advised pt account is assigned to someone and stat had been placed on it, call back later and check if not done by this afternoon will need to  reschedule patient

## 2023-03-16 NOTE — TELEPHONE ENCOUNTER
Spoke to rep at [facility] and she stated that after talking to insurance review team pt account is one they have to call insurance directly to verify and they are doing so now  Pt Botox will not be here in time for 3/20/2023  Will follow with maryjo

## 2023-03-16 NOTE — TELEPHONE ENCOUNTER
Spoke to pt, perform has not reached out  Asked pt to contact directyly before end of day to give pt consent, she agreed and stated, will try to do in between clients  Will follow up with [facility] today

## 2023-03-22 ENCOUNTER — APPOINTMENT (OUTPATIENT)
Dept: RADIOLOGY | Facility: CLINIC | Age: 28
End: 2023-03-22

## 2023-03-22 ENCOUNTER — APPOINTMENT (OUTPATIENT)
Dept: URGENT CARE | Facility: CLINIC | Age: 28
End: 2023-03-22

## 2023-03-22 DIAGNOSIS — Z02.1 PRE-EMPLOYMENT HEALTH SCREENING EXAMINATION: Primary | ICD-10-CM

## 2023-03-22 DIAGNOSIS — Z02.1 PRE-EMPLOYMENT HEALTH SCREENING EXAMINATION: ICD-10-CM

## 2023-03-22 NOTE — TELEPHONE ENCOUNTER
Josue Shannon called from [facility] wants to set up a delivery date for botox medicine  Please call Josue Shannon at [phone] option 2

## 2023-03-23 ENCOUNTER — TELEPHONE (OUTPATIENT)
Dept: NEUROLOGY | Facility: CLINIC | Age: 28
End: 2023-03-23

## 2023-03-28 ENCOUNTER — TELEPHONE (OUTPATIENT)
Dept: NEUROLOGY | Facility: CLINIC | Age: 28
End: 2023-03-28

## 2023-03-28 ENCOUNTER — PROCEDURE VISIT (OUTPATIENT)
Dept: NEUROLOGY | Facility: CLINIC | Age: 28
End: 2023-03-28

## 2023-03-28 VITALS — DIASTOLIC BLOOD PRESSURE: 90 MMHG | SYSTOLIC BLOOD PRESSURE: 146 MMHG | TEMPERATURE: 97.2 F

## 2023-03-28 DIAGNOSIS — G43.709 CHRONIC MIGRAINE WITHOUT AURA WITHOUT STATUS MIGRAINOSUS, NOT INTRACTABLE: Primary | ICD-10-CM

## 2023-03-28 NOTE — TELEPHONE ENCOUNTER
Received VM Transcription:     Hi This is Uzma Alvarado, patient care coordinator from [place] on a recorded line regarding Rajiv Spikes to find out if her Prior Auth for Nurtec OTD 75 mg has been approved  Please call back at [phone]  Thank you  ___________________________________    Phone call to [phone]  Transferred to [phone]  Spoke with Brown Chaudhary at [employer]  Advised pt's PA was denied 2-15-23  L[place]allero requested a copy of the denial letter faxed to [phone] so pt could obtain a pt care card and have her Nurtec refilled  Faxed denial letter  Fax to be scanned into pt's chart

## 2023-03-28 NOTE — PATIENT INSTRUCTIONS
Botox Therapy  Important Information    Our goal is to make sure you fully understand how Botox Therapy treatment may benefit you and to help you understand how you can play an active role in your treatments and ongoing care  Please review the following information below  Call our office IMMEDIATELY @ [phone] and speak to one of our Botox Coordinators if you have a change in insurance  (a prior authorization is required and accurate information is vital)  Please call at least 24 hours in advance if you can't make your appointment  Appointments are scheduled every 91 days (this will be scheduled in advance before leaving the office)  You must allow for at least 2-3 treatments to determine if Botox is right for you  It may take a few weeks to see a response from treatment  No hair dye or scalp massage or treatment within 24 hours of treatment  We encourage you to use a headache diary or journal to document your headache frequency and severity  You can also utilize the Migraine Khoi karla (downloadable on [employer])  Sign up for the Botox Savings Program  (commercial insurance patients may qualify) Sign up at [employer] what's in store or call [phone] Option: 4  To get more information on Botox therapy for Chronic Migraines and see frequently asked questions, please visit [facility]  If you have any questions or concerns, please speak to one of our Botox Coordinators at [phone]  We look forward to servicing you! Headache management instructions  - When patient has a moderate to severe headache, they should seek rest, initiate relaxation and apply cold compresses to the head  - Maintain regular sleep schedule  Adults need at least 7-8 hours of uninterrupted a night  - Limit over the counter medications such as Tylenol, Ibuprofen, Aleve, Excedrin  (No more than 2- 3 times a week or max 10 a month)  - Maintain headache diary    Free KARLA for a smart phone, which can be "used is \"Migraine ede\"  - Limit caffeine to 1-2 cups 8 to 16 oz a day or less  - Avoid dietary trigger  (aged cheese, peanuts, MSG, aspartame and nitrates)  - Patient is to have regular frequent meals to prevent headache onset  - Please drink at least 64 ounces of water a day to help remain hydrated     "

## 2023-03-28 NOTE — PROGRESS NOTES
Universal Protocol   Consent: Verbal consent obtained  Written consent obtained    Risks and benefits: risks, benefits and alternatives were discussed  Consent given by: patient  Patient understanding: patient states understanding of the procedure being performed  Patient consent: the patient's understanding of the procedure matches consent given  Procedure consent: procedure consent matches procedure scheduled        Chemodenervation     Date/Time 3/28/2023 2:29 PM     Performed by  Heron Zurita PA-C     Authorized by Heron Zurita PA-C        Pre-procedure details      Prepped With: Alcohol     Procedure details     Position:  Upright   Botox     Botox Type:  Type A    Brand:  Botox    mL's of Botulinum Toxin:  190    Final Concentration per CC:  100 units    Needle Gauge:  30 G 2 5 inch   Procedures     Botox Procedures: chronic headache      Indications: migraines     Injection Location      Head / Face:  L superior trapezius, R superior trapezius, L superior cervical paraspinal, R superior cervical paraspinal, L , R , procerus, L temporalis, R temporalis, R frontalis, L frontalis, R medial occipitalis and L medial occipitalis    L  injection amount:  5 unit(s)    R  injection amount:  5 unit(s)    L lateral frontalis:  5 unit(s)    R lateral frontalis:  5 unit(s)    L medial frontalis:  5 unit(s)    R medial frontalis:  5 unit(s)    L temporalis injection amount:  20 unit(s)    R temporalis injection amount:  20 unit(s)    Procerus injection amount:  5 unit(s)    L medial occipitalis injection amount:  15 unit(s)    R medial occipitalis injection amount:  15 unit(s)    L superior cervical paraspinal injection amount:  10 unit(s)    R superior cervical paraspinal injection amount:  10 unit(s)    L superior trapezius injection amount:  15 unit(s)    R superior trapezius injection amount:  15 unit(s)   Total Units     Total units used:  190    Total units discarded: 10   Post-procedure details      Chemodenervation:  Chronic migraine    Facial Nerve Location[de-identified]  Bilateral facial nerve    Patient tolerance of procedure: Tolerated well, no immediate complications   Comments      Extra units medically necessary- 20 R occipitalis (behind ear/ suboccipitalis region); 15 between both suboccipitalis muscles (lateral aspects)    Cold spray used on traps and forehead  Blood pressure 146/90, temperature (!) 97 2 °F (36 2 °C), temperature source Temporal     Pt took decadron 1 mg this am and will use benadryl tonight for insomnia related to decadron  Fortunately decadron seems to be the only thing that helps reduce the headaches post-botox, at least partially  She will take another 1 mg dose tomorrow AM if migraine occurs tomorrow  She does report reduction of migraines for the past 1-2 months, thinks that the botox eventually helped reduce the migraines but it took a while (about a month)  Hoping she will not have s/e of worsening migraines this time around using the decadron  Pt will contact if needed

## 2023-04-05 ENCOUNTER — TELEPHONE (OUTPATIENT)
Dept: NEUROLOGY | Facility: CLINIC | Age: 28
End: 2023-04-05

## 2023-04-05 DIAGNOSIS — G43.709 CHRONIC MIGRAINE WITHOUT AURA WITHOUT STATUS MIGRAINOSUS, NOT INTRACTABLE: Primary | ICD-10-CM

## 2023-04-05 DIAGNOSIS — M79.7 FIBROMYALGIA MUSCLE PAIN: ICD-10-CM

## 2023-04-05 DIAGNOSIS — G43.109 MIGRAINE WITH AURA AND WITHOUT STATUS MIGRAINOSUS, NOT INTRACTABLE: ICD-10-CM

## 2023-04-05 DIAGNOSIS — G43.809 VESTIBULAR MIGRAINE: ICD-10-CM

## 2023-04-05 RX ORDER — KETOROLAC TROMETHAMINE 30 MG/ML
60 INJECTION, SOLUTION INTRAMUSCULAR; INTRAVENOUS ONCE
Status: COMPLETED | OUTPATIENT
Start: 2023-04-05 | End: 2023-04-06

## 2023-04-05 RX ORDER — GABAPENTIN 100 MG/1
CAPSULE ORAL
Qty: 150 CAPSULE | Refills: 3 | Status: SHIPPED | OUTPATIENT
Start: 2023-04-05

## 2023-04-05 NOTE — TELEPHONE ENCOUNTER
Called pt  Made her aware of below  Gabapentin helps dims it a little bit but it does not take it aware  She is currently taking gabapentin 200mg in am and 100mg at bedtime  She will increase hs dose to 300mg    Please enter script for gabapentin as appropriate   [facility] [address] [facility]    I scheduled pt for toradol injection for tomorrow at 7:30am   Order is already in

## 2023-04-05 NOTE — TELEPHONE ENCOUNTER
Receive VM Transcription:    Hi, my name is Jeff Borrero, and I'm calling to see if there's anything else I can get prescribed or come in the office for a shot from migraines  I had Botox last week and I'm going on over 6 days a migraines continuously  I'm just really uncomfortable at this point  I'm not sleeping at all very, very little  If you could give me a call back at [phone]  That would be great  Thanks bye  ___________________________________    Phone call to pt  Advised I will send a message to [facility] for her recommendations  When did migraine start? 6 days ago    Location/Description: Entire head    Pain scale: 10+    Associated symptoms: Vomiting x 1 hour  now resolved), photophobia, blurred vision, dizziness, sound sensitive    Precipitating factors: Began after having Botox injection    Alleviating factors: Decadron helped  Pt was pain free but does not want to stay on it longer than 3 days  Ice packs made the migraine worse  What medicines have you tried for this migraine headache? Nurtec  Gabapentin,   Benadryl,   Decadron x 3 days, helped pt was pain free  Meclizine  Zofran     Current migraine medications are confirmed as:  Nurtec  Gabapentin,   Decadron x 3 days, helped pt was pain free      Medications tried in the past? Ajovy provided some relief  Pt had an allergic rash  Pt stated [facility] told her she could try Ajovy again with Benadryl  Depakote caused racing thoughts  Not taking Zyprexa    Only getting 3 hours of sleep per night  Pt asked if she could come in for a Toradol injection and if there is any other medication she could try  # [id number]  [id number] Carol Metzger to leave a detailed message    [facility]    Last OV 2-24-23  Next OV 6-23-23      [facility], please advise   Thank you!!!

## 2023-04-05 NOTE — TELEPHONE ENCOUNTER
Is the gabpentin helpful for migraines? We could maximize that   I would go ahead and increase the night time dose to 300 mg  She can come in for toradol  Thanks

## 2023-04-26 ENCOUNTER — APPOINTMENT (OUTPATIENT)
Dept: URGENT CARE | Facility: CLINIC | Age: 28
End: 2023-04-26

## 2023-04-28 ENCOUNTER — TELEPHONE (OUTPATIENT)
Dept: NEUROLOGY | Facility: CLINIC | Age: 28
End: 2023-04-28

## 2023-04-28 NOTE — TELEPHONE ENCOUNTER
April 27, 2023  Tere Glez PA-C       11:54 AM  Unsigned Note   Clinical- I will order vyepti for her, placed orders. Thanks.

## 2023-04-28 NOTE — TELEPHONE ENCOUNTER
PA form completed for [facility].    Attempted to fax multiple times but goes to retry mode.   Faxed PA form and office note electronically via EPIC  Placed form at  to be scanned into chart.

## 2023-04-28 NOTE — TELEPHONE ENCOUNTER
Called independence administrators at   [facility].  Gave codes-  , [id number], [id number]   needs PA-can complete form from their website.  [email or web address], under provider, pre-certification then provider fax form  Turn around time 15 days for standard  Admin codes do not require PA  Buy and bill ok    Deductible $3500-met $1650.95  If done at Out pt infusion center it is  Covered at 70% of the cost    Ref#[id number]    Need to know what infusion center pt would prefer for PA form.     [employer] message sent to pt

## 2023-05-02 NOTE — TELEPHONE ENCOUNTER
vyepti connect form completed and faxed electronically through Epic.     vyepti complete will complete benefits investigation and call pt to give a summary of the benefits.    [employer] message sent to pt    Corinne PA still pending

## 2023-05-04 ENCOUNTER — HOSPITAL ENCOUNTER (EMERGENCY)
Facility: HOSPITAL | Age: 28
Discharge: HOME | End: 2023-05-04
Attending: EMERGENCY MEDICINE

## 2023-05-04 ENCOUNTER — TELEPHONE (OUTPATIENT)
Dept: NEUROLOGY | Facility: CLINIC | Age: 28
End: 2023-05-04

## 2023-05-04 VITALS
HEART RATE: 83 BPM | TEMPERATURE: 98.7 F | RESPIRATION RATE: 18 BRPM | WEIGHT: 143.1 LBS | OXYGEN SATURATION: 98 % | DIASTOLIC BLOOD PRESSURE: 81 MMHG | SYSTOLIC BLOOD PRESSURE: 122 MMHG | BODY MASS INDEX: 28.9 KG/M2

## 2023-05-04 DIAGNOSIS — T78.2XXA ANAPHYLAXIS, INITIAL ENCOUNTER: Primary | ICD-10-CM

## 2023-05-04 DIAGNOSIS — R00.0 SINUS TACHYCARDIA: ICD-10-CM

## 2023-05-04 LAB
ALBUMIN SERPL BCP-MCNC: 4.3 G/DL (ref 3.5–5)
ALP SERPL-CCNC: 62 U/L (ref 34–104)
ALT SERPL W P-5'-P-CCNC: 17 U/L (ref 7–52)
ANION GAP SERPL CALCULATED.3IONS-SCNC: 8 MMOL/L (ref 4–13)
AST SERPL W P-5'-P-CCNC: 15 U/L (ref 13–39)
ATRIAL RATE: 133 BPM
ATRIAL RATE: 135 BPM
BASOPHILS # BLD MANUAL: 0 THOUSAND/UL (ref 0–0.1)
BASOPHILS NFR MAR MANUAL: 0 % (ref 0–1)
BILIRUB SERPL-MCNC: 0.21 MG/DL (ref 0.2–1)
BUN SERPL-MCNC: 10 MG/DL (ref 5–25)
CALCIUM SERPL-MCNC: 8.8 MG/DL (ref 8.4–10.2)
CARDIAC TROPONIN I PNL SERPL HS: 4 NG/L
CHLORIDE SERPL-SCNC: 109 MMOL/L (ref 96–108)
CO2 SERPL-SCNC: 21 MMOL/L (ref 21–32)
CREAT SERPL-MCNC: 0.71 MG/DL (ref 0.6–1.3)
D DIMER PPP FEU-MCNC: <0.27 UG/ML FEU
EOSINOPHIL # BLD MANUAL: 0 THOUSAND/UL (ref 0–0.4)
EOSINOPHIL NFR BLD MANUAL: 0 % (ref 0–6)
ERYTHROCYTE [DISTWIDTH] IN BLOOD BY AUTOMATED COUNT: 14.2 % (ref 11.6–15.1)
GFR SERPL CREATININE-BSD FRML MDRD: 115 ML/MIN/1.73SQ M
GLUCOSE SERPL-MCNC: 149 MG/DL (ref 65–140)
HCT VFR BLD AUTO: 35.6 % (ref 34.8–46.1)
HGB BLD-MCNC: 11.8 G/DL (ref 11.5–15.4)
LYMPHOCYTES # BLD AUTO: 0.94 THOUSAND/UL (ref 0.6–4.47)
LYMPHOCYTES # BLD AUTO: 10 % (ref 14–44)
MCH RBC QN AUTO: 29.4 PG (ref 26.8–34.3)
MCHC RBC AUTO-ENTMCNC: 33.1 G/DL (ref 31.4–37.4)
MCV RBC AUTO: 89 FL (ref 82–98)
MONOCYTES # BLD AUTO: 0.19 THOUSAND/UL (ref 0–1.22)
MONOCYTES NFR BLD: 2 % (ref 4–12)
NEUTROPHILS # BLD MANUAL: 8.3 THOUSAND/UL (ref 1.85–7.62)
NEUTS SEG NFR BLD AUTO: 88 % (ref 43–75)
P AXIS: 56 DEGREES
P AXIS: 57 DEGREES
PLATELET # BLD AUTO: 356 THOUSANDS/UL (ref 149–390)
PLATELET BLD QL SMEAR: ADEQUATE
PMV BLD AUTO: 8.6 FL (ref 8.9–12.7)
POTASSIUM SERPL-SCNC: 3.9 MMOL/L (ref 3.5–5.3)
PR INTERVAL: 146 MS
PR INTERVAL: 148 MS
PROT SERPL-MCNC: 7.7 G/DL (ref 6.4–8.4)
QRS AXIS: 19 DEGREES
QRS AXIS: 21 DEGREES
QRSD INTERVAL: 62 MS
QRSD INTERVAL: 64 MS
QT INTERVAL: 288 MS
QT INTERVAL: 288 MS
QTC INTERVAL: 428 MS
QTC INTERVAL: 432 MS
RBC # BLD AUTO: 4.01 MILLION/UL (ref 3.81–5.12)
RBC MORPH BLD: NORMAL
SODIUM SERPL-SCNC: 138 MMOL/L (ref 135–147)
T WAVE AXIS: 11 DEGREES
T WAVE AXIS: 9 DEGREES
VENTRICULAR RATE: 133 BPM
VENTRICULAR RATE: 135 BPM
WBC # BLD AUTO: 9.43 THOUSAND/UL (ref 4.31–10.16)

## 2023-05-04 RX ORDER — EPINEPHRINE 1 MG/ML
0.3 INJECTION, SOLUTION, CONCENTRATE INTRAVENOUS ONCE
Status: COMPLETED | OUTPATIENT
Start: 2023-05-04 | End: 2023-05-04

## 2023-05-04 RX ORDER — FAMOTIDINE 10 MG/ML
20 INJECTION, SOLUTION INTRAVENOUS ONCE
Status: COMPLETED | OUTPATIENT
Start: 2023-05-04 | End: 2023-05-04

## 2023-05-04 RX ORDER — EPINEPHRINE 0.1 MG/ML
SYRINGE (ML) INJECTION
Status: DISCONTINUED
Start: 2023-05-04 | End: 2023-05-04 | Stop reason: WASHOUT

## 2023-05-04 RX ORDER — EPINEPHRINE 1 MG/ML
INJECTION, SOLUTION, CONCENTRATE INTRAVENOUS
Status: COMPLETED
Start: 2023-05-04 | End: 2023-05-04

## 2023-05-04 RX ORDER — EPINEPHRINE 0.3 MG/.3ML
0.3 INJECTION SUBCUTANEOUS ONCE
Qty: 0.6 ML | Refills: 0 | Status: SHIPPED | OUTPATIENT
Start: 2023-05-04 | End: 2023-05-04

## 2023-05-04 RX ORDER — DIPHENHYDRAMINE HYDROCHLORIDE 50 MG/ML
25 INJECTION INTRAMUSCULAR; INTRAVENOUS ONCE
Status: COMPLETED | OUTPATIENT
Start: 2023-05-04 | End: 2023-05-04

## 2023-05-04 RX ADMIN — METOPROLOL TARTRATE 25 MG: 25 TABLET, FILM COATED ORAL at 17:18

## 2023-05-04 RX ADMIN — EPINEPHRINE 0.3 MG: 1 INJECTION, SOLUTION, CONCENTRATE INTRAVENOUS at 12:38

## 2023-05-04 RX ADMIN — DIPHENHYDRAMINE HYDROCHLORIDE 25 MG: 50 INJECTION, SOLUTION INTRAMUSCULAR; INTRAVENOUS at 12:38

## 2023-05-04 RX ADMIN — SODIUM CHLORIDE 1000 ML: 0.9 INJECTION, SOLUTION INTRAVENOUS at 13:55

## 2023-05-04 RX ADMIN — FAMOTIDINE 20 MG: 10 INJECTION INTRAVENOUS at 12:39

## 2023-05-04 NOTE — TELEPHONE ENCOUNTER
JEANM informing pt her BINJ appt scheduled in September was incorrectly scheduled as it is required BINJ are exactly 91 days from the last procedure. Offered 9/29 in the afternoon. Instructed pt to return call asap.

## 2023-05-04 NOTE — ED PROVIDER NOTES
"History  Chief Complaint   Patient presents with    Allergic Reaction     Pt to er with reports of starting prednisone at 0900 today, then noticed at 1100 that her left arm was swollen and painful  Also reports that she is \"having trouble breathing while talking\"  Feels like her throat is tight  Denies chest pain  Patient was evaluated for URI yesterday and started on inhaler and prescribed prednisone  Patient states that she took the first dose of prednisone at 9 AM and at 11 AM she developed trouble breathing, throat tightness, lightheadedness and felt like her left arm was starting to become swollen  She denies any facial swelling or tongue swelling, states like she felt her throat was tight and it was making it hard to speak  Nothing else new aside from the prednisone  Prior to Admission Medications   Prescriptions Last Dose Informant Patient Reported? Taking? [facility] 8 6 MG tablet   Yes No   Sig: Take 1 tablet by mouth if needed   OLANZapine (ZyPREXA) 5 mg tablet   No No   Sig: Take 1 tablet (5 mg total) by mouth daily at bedtime for 5 days   Prucalopride Succinate 2 MG TABS   Yes No   Sig: Take 1 tablet by mouth daily   Rimegepant Sulfate (NURTEC) 75 MG TBDP   No No   Sig: Take Nurtec every other day  If pt gets a migraine on the days not scheduled to take the pill, pt may take it then skip the next day  dexamethasone (DECADRON) 1 mg tablet   No No   Si tab qam with food 1 day prior to botox, day of botox and 1 day after botox   divalproex sodium (DEPAKOTE) 250 mg EC tablet   No No   Si tabs q12 h x 2 days, then 1 tab q12 h x 2 days, then stop  Repeat if needed     gabapentin (NEURONTIN) 100 mg capsule   No No   Si mg qam and 300 mg qhs   levothyroxine 50 mcg tablet   Yes No   Sig: Take 50 mcg by mouth daily   metoclopramide (REGLAN) 5 mg tablet   Yes No   Sig: Take 5 mg by mouth if needed   norethindrone (MICRONOR) 0 35 MG tablet   Yes No   Sig: daily   nortriptyline " (PAMELOR) 10 mg capsule   Yes No   Sig: Take 10 mg by mouth daily at bedtime   ondansetron (ZOFRAN) 4 mg tablet   No No   Si tab q8 hours prn nausea/ vomiting   polyethylene glycol (MIRALAX) 17 g packet   Yes No   Sig: Take 17 g by mouth as needed       Facility-Administered Medications: None       Past Medical History:   Diagnosis Date    Hashimoto's disease 2022    IBS (irritable bowel syndrome)     Kidney stones        Past Surgical History:   Procedure Laterality Date    CHOLECYSTECTOMY     [facility] HIP SURGERY  2022    [address] [place]       History reviewed  No pertinent family history  I have reviewed and agree with the history as documented  E-Cigarette/Vaping    E-Cigarette Use Never User      E-Cigarette/Vaping Substances    Nicotine No     THC No     CBD No     Flavoring No     Other No     Unknown No      Social History     Tobacco Use    Smoking status: Never    Smokeless tobacco: Never   Vaping Use    Vaping Use: Never used   Substance Use Topics    Alcohol use: Yes     Comment: social    Drug use: Never       Review of Systems   HENT: Negative for drooling, facial swelling and voice change  Respiratory: Positive for shortness of breath  Negative for chest tightness  Cardiovascular: Negative for chest pain, palpitations and leg swelling  Gastrointestinal: Negative for abdominal pain, nausea and vomiting  Skin: Negative for rash  Physical Exam  Physical Exam  Vitals and nursing note reviewed  Constitutional:       General: She is not in acute distress  Appearance: Normal appearance  She is not ill-appearing, toxic-appearing or diaphoretic  HENT:      Head: Normocephalic and atraumatic  Mouth/Throat:      Mouth: Mucous membranes are moist  No angioedema  Pharynx: Oropharynx is clear  Uvula midline  No uvula swelling     Eyes:      Conjunctiva/sclera: Conjunctivae normal       Pupils: Pupils are equal, round, and reactive to light  Cardiovascular:      Rate and Rhythm: Regular rhythm  Tachycardia present  Pulses: Normal pulses  Heart sounds: Normal heart sounds  No murmur heard  Pulmonary:      Effort: Pulmonary effort is normal  No respiratory distress  Breath sounds: No stridor  Wheezing present  No rhonchi or rales  Chest:      Chest wall: No tenderness  Abdominal:      General: Bowel sounds are normal  There is no distension  Palpations: Abdomen is soft  Tenderness: There is no abdominal tenderness  There is no guarding or rebound  Skin:     General: Skin is warm and dry  Findings: No rash  Neurological:      General: No focal deficit present  Mental Status: She is alert and oriented to person, place, and time  Mental status is at baseline     Psychiatric:         Mood and Affect: Mood normal          Behavior: Behavior normal          Vital Signs  ED Triage Vitals   Temperature Pulse Respirations Blood Pressure SpO2   05/04/23 1228 05/04/23 1229 05/04/23 1229 05/04/23 1229 05/04/23 1229   98 7 °F (37 1 °C) (!) 107 20 154/98 98 %      Temp Source Heart Rate Source Patient Position - Orthostatic VS BP Location FiO2 (%)   05/04/23 1228 05/04/23 1229 05/04/23 1229 05/04/23 1229 --   Temporal Monitor Sitting Right arm       Pain Score       05/04/23 1358       No Pain           Vitals:    05/04/23 1700 05/04/23 1718 05/04/23 1808 05/04/23 1853   BP: 130/87 130/87 132/86 122/81   Pulse: (!) 129 (!) 129 104 83   Patient Position - Orthostatic VS: Lying   Sitting         Visual Acuity      ED Medications  Medications   EPINEPHrine PF (ADRENALIN) 1 mg/mL injection 0 3 mg (0 3 mg Intramuscular Given 5/4/23 1238)   diphenhydrAMINE (BENADRYL) injection 25 mg (25 mg Intravenous Given 5/4/23 1238)   Famotidine (PF) (PEPCID) injection 20 mg (20 mg Intravenous Given 5/4/23 1239)   sodium chloride 0 9 % bolus 1,000 mL (0 mL Intravenous Stopped 5/4/23 1604)   metoprolol tartrate (LOPRESSOR) tablet 25 mg (25 mg Oral Given 5/4/23 1718)       Diagnostic Studies  Results Reviewed     Procedure Component Value Units Date/Time    D-dimer, quantitative [[id number]]  (Normal) Collected: 05/04/23 1718    Lab Status: Final result Specimen: Blood from Arm, Left Updated: 05/04/23 1741     D-Dimer, Quant <0 27 ug/ml FEU     CBC and differential [[id number]]  (Abnormal) Collected: 05/04/23 1539    Lab Status: Final result Specimen: Blood from Arm, Left Updated: 05/04/23 1632     WBC 9 43 Thousand/uL      RBC 4 01 Million/uL      Hemoglobin 11 8 g/dL      Hematocrit 35 6 %      MCV 89 fL      MCH 29 4 pg      MCHC 33 1 g/dL      RDW 14 2 %      MPV 8 6 fL      Platelets 695 Thousands/uL     Narrative: This is an appended report  These results have been appended to a previously verified report      Manual Differential(PHLEBS Do Not Order) [[id number]]  (Abnormal) Collected: 05/04/23 1539    Lab Status: Final result Specimen: Blood from Arm, Left Updated: 05/04/23 1632     Segmented % 88 %      Lymphocytes % 10 %      Monocytes % 2 %      Eosinophils, % 0 %      Basophils % 0 %      Absolute Neutrophils 8 30 Thousand/uL      Lymphocytes Absolute 0 94 Thousand/uL      Monocytes Absolute 0 19 Thousand/uL      Eosinophils Absolute 0 00 Thousand/uL      Basophils Absolute 0 00 Thousand/uL      Total Counted --     RBC Morphology Normal     Platelet Estimate Adequate    HS Troponin 0hr (reflex protocol) [[id number]]  (Normal) Collected: 05/04/23 1539    Lab Status: Final result Specimen: Blood from Arm, Left Updated: 05/04/23 1614     hs TnI 0hr 4 ng/L     Comprehensive metabolic panel [[id number]]  (Abnormal) Collected: 05/04/23 1539    Lab Status: Final result Specimen: Blood from Arm, Left Updated: 05/04/23 1602     Sodium 138 mmol/L      Potassium 3 9 mmol/L      Chloride 109 mmol/L      CO2 21 mmol/L      ANION GAP 8 mmol/L      BUN 10 mg/dL      Creatinine 0 71 mg/dL      Glucose 149 mg/dL      Calcium 8 8 mg/dL      AST 15 U/L ALT 17 U/L      Alkaline Phosphatase 62 U/L      Total Protein 7 7 g/dL      Albumin 4 3 g/dL      Total Bilirubin 0 21 mg/dL      eGFR 115 ml/min/1 73sq m     Narrative:      Abigail guidelines for Chronic Kidney Disease (CKD):     Stage 1 with normal or high GFR (GFR > 90 mL/min/1 73 square meters)    Stage 2 Mild CKD (GFR = 60-89 mL/min/1 73 square meters)    Stage 3A Moderate CKD (GFR = 45-59 mL/min/1 73 square meters)    Stage 3B Moderate CKD (GFR = 30-44 mL/min/1 73 square meters)    Stage 4 Severe CKD (GFR = 15-29 mL/min/1 73 square meters)    Stage 5 End Stage CKD (GFR <15 mL/min/1 73 square meters)  Note: GFR calculation is accurate only with a steady state creatinine                 No orders to display              Procedures  CriticalCare Time    Date/Time: 5/4/2023 12:35 PM  Performed by: Kelly Sharma DO  Authorized by: Kelly Sharma DO     Critical care provider statement:     Critical care time (minutes):  35    Critical care start time:  5/4/2023 12:35 PM    Critical care end time:  5/4/2023 2:10 PM    Critical care time was exclusive of:  Separately billable procedures and treating other patients and teaching time    Critical care was necessary to treat or prevent imminent or life-threatening deterioration of the following conditions:  Shock (anaphlyaxis)    Critical care was time spent personally by me on the following activities:  Blood draw for specimens, obtaining history from patient or surrogate, development of treatment plan with patient or surrogate, evaluation of patient's response to treatment, examination of patient, review of old charts, re-evaluation of patient's condition, ordering and review of radiographic studies, ordering and review of laboratory studies and ordering and performing treatments and interventions    I assumed direction of critical care for this patient from another provider in my specialty: no      ECG 12 Lead Documentation Only    Date/Time: 5/4/2023 3:47 PM  Performed by: Kerwin Kidd DO  Authorized by: Kerwin Kidd DO     Indications / Diagnosis:  Persistent tachycardia   ECG reviewed by me, the ED Provider: yes    Patient location:  ED  Previous ECG:     Previous ECG:  Unavailable  Interpretation:     Interpretation: normal    Rate:     ECG rate:  133    ECG rate assessment: tachycardic    Rhythm:     Rhythm: sinus tachycardia    Ectopy:     Ectopy: none    QRS:     QRS axis:  Normal    QRS intervals:  Normal  Conduction:     Conduction: normal    ST segments:     ST segments:  Normal  T waves:     T waves: normal    Comments:      [facility] 889             ED Course  ED Course as of 05/04/23 2205   Thu May 04, 2023   1620 Review of medical records from care everywhere shows that the patient has a past medical history of palpitations and tachycardia thought to be due to hyperthyroidism, following up with cardiology who prescribed Toprol-XL 12 5 mg daily    Patient reports that her HR goes up to the 120-130s with activity, but has been in the low 100s since starting the metoprolol  Currently, she feels well, but she does feel the palpitations  No CP, lightheadedness, dizziness  5 Tigertexted cardiology, Dr Brendon Sinclair   25 [id number] Tsh 1 61 on 3/2/23   1645 Dr Ramonita Fulton, cardiology recommends 25mg PO metoprolol at this time and to add on a dimer to rule out PE  Dr Ramonita Fulton reviewed patient's cardiology notes and patient has possible POTS or autonomic dysfunction so this is likely not acute problem  1750 D-Dimer, Quant: <0 27   1929 Patient HR improved and is now in the low 100s  Continues to feel well  Discussed stopping prednisone, follow up with cardiology, and pcp  Reviewed strict return precautions with patient who verbalized understanding               HEART Risk Score    Flowsheet Row Most Recent Value   Heart Score Risk Calculator    History --   ECG 0 Filed at: 05/04/2023 2205   Age 0 Filed at: 05/04/2023 2205   Risk Factors 0 Filed at: 05/04/2023 2205   Troponin 0 Filed at: 05/04/2023 2205   HEART Score --                        SBIRT 22yo+    Flowsheet Row Most Recent Value   Initial Alcohol Screen: US AUDIT-C     1  How often do you have a drink containing alcohol? 0 Filed at: 05/04/2023 1231   2  How many drinks containing alcohol do you have on a typical day you are drinking? 0 Filed at: 05/04/2023 1231   3a  Male UNDER 65: How often do you have five or more drinks on one occasion? 0 Filed at: 05/04/2023 1231   3b  FEMALE Any Age, or MALE 65+: How often do you have 4 or more drinks on one occassion? 0 Filed at: 05/04/2023 1231   Audit-C Score 0 Filed at: 05/04/2023 1231   LYNETTE: How many times in the past year have you    Used an illegal drug or used a prescription medication for non-medical reasons? Never Filed at: 05/04/2023 1231          Wells' Criteria for PE    Flowsheet Row Most Recent Value   Wells' Criteria for PE    Clinical signs and symptoms of DVT 0 Filed at: 05/04/2023 2205   PE is primary diagnosis or equally likely 0 Filed at: 05/04/2023 2205   HR >100 1 5 Filed at: 05/04/2023 2205   Immobilization at least 3 days or Surgery in the previous 4 weeks 0 Filed at: 05/04/2023 2205   Previous, objectively diagnosed PE or DVT 0 Filed at: 05/04/2023 2205   Hemoptysis 0 Filed at: 05/04/2023 2205   Malignancy with treatment within 6 months or palliative 0 Filed at: 05/04/2023 2205   Wells' Criteria Total 1 5 Filed at: 05/04/2023 2205                Medical Decision Making  Assessment and plan: Anaphylaxis  Will treat with Benadryl, epinephrine, Pepcid  Avoid steroids  Will monitor closely and reassess  Risk  Prescription drug management            Disposition  Final diagnoses:   Anaphylaxis, initial encounter   Sinus tachycardia     Time reflects when diagnosis was documented in both MDM as applicable and the Disposition within this note     Time User Action Codes Description Comment    5/4/2023 6:39 PM Krista Redd Add [T78  2XXA] Anaphylaxis, initial encounter     5/4/2023  6:39 PM Krista Pinedoer Add [R00 0] Sinus tachycardia       ED Disposition     ED Disposition   Discharge    Condition   Stable    Date/Time   Thu May 4, 2023  6:02 PM    Comment   Jason Rosario discharge to home/self care  Follow-up Information     Follow up With Specialties Details Why Contact Info Additional Vinny Garcia [address] [facility] Emergency Medicine Go to  As needed, If symptoms worsen, for re-evaluation [address] [place], [address]  [address] [facility] Emergency Department, [address] [facility] Wiley Metzger Luige Rishi [address]    Jose Serve, [place] [facility] Schedule an appointment as soon as possible for a visit in 3 days for re-evaluation [address] [facility],Third Floor  [address] [address] [phone]       Your Cardiologist  Call in 1 day for re-evaluation            Discharge Medication List as of 5/4/2023  7:31 PM      START taking these medications    Details   EPINEPHrine (EPIPEN) 0 3 mg/0 3 mL SOAJ Inject 0 3 mL (0 3 mg total) into a muscle once for 1 dose, Starting Thu 5/4/2023, Normal         CONTINUE these medications which have NOT CHANGED    Details   [facility] 8 6 MG tablet Take 1 tablet by mouth if needed, Starting Mon 9/13/2021, Historical Med      dexamethasone (DECADRON) 1 mg tablet 1 tab qam with food 1 day prior to botox, day of botox and 1 day after botox, Normal      divalproex sodium (DEPAKOTE) 250 mg EC tablet 2 tabs q12 h x 2 days, then 1 tab q12 h x 2 days, then stop   Repeat if needed , Normal      gabapentin (NEURONTIN) 100 mg capsule 200 mg qam and 300 mg qhs, Normal      levothyroxine 50 mcg tablet Take 50 mcg by mouth daily, Starting Fri 11/11/2022, Historical Med      metoclopramide (REGLAN) 5 mg tablet Take 5 mg by mouth if needed, Starting Tue 3/9/2021, Historical Med      norethindrone (MICRONOR) 0 35 MG tablet daily, Starting Thu 4/22/2021, Historical Med      nortriptyline (PAMELOR) 10 mg capsule Take 10 mg by mouth daily at bedtime, Historical Med      OLANZapine (ZyPREXA) 5 mg tablet Take 1 tablet (5 mg total) by mouth daily at bedtime for 5 days, Starting Wed 11/23/2022, Until Mon 11/28/2022, Normal      ondansetron (ZOFRAN) 4 mg tablet 1 tab q8 hours prn nausea/ vomiting, Normal      polyethylene glycol (MIRALAX) 17 g packet Take 17 g by mouth as needed , Historical Med      Prucalopride Succinate 2 MG TABS Take 1 tablet by mouth daily, Starting Mon 3/8/2021, Historical Med      Rimegepant Sulfate (NURTEC) 75 MG TBDP Take Nurtec every other day  If pt gets a migraine on the days not scheduled to take the pill, pt may take it then skip the next day , Normal             No discharge procedures on file      PDMP Review     None          ED Provider  Electronically Signed by           Vania Naqvi DO  05/04/23 8701

## 2023-05-09 ENCOUNTER — APPOINTMENT (OUTPATIENT)
Dept: URGENT CARE | Facility: CLINIC | Age: 28
End: 2023-05-09

## 2023-05-10 NOTE — TELEPHONE ENCOUNTER
Benefits investigation not received yet.    Vyepti connect form refaxed electronically through EPIC

## 2023-05-12 NOTE — TELEPHONE ENCOUNTER
Benefits investigation not received yet    Called vyepti connect at [phone] and spoke to kayden. She does not see that enrollment form was received.      Enrollment form faxed again-faxed successfully

## 2023-05-17 NOTE — TELEPHONE ENCOUNTER
Benefits investigation not received yet.    Called vyepti connect at [phone] and spoke to priyank.   States that they received [employer] enrollment and still being worked on.  Should receive benefits investigation by end of day today

## 2023-05-19 ENCOUNTER — TELEPHONE (OUTPATIENT)
Dept: NEUROLOGY | Facility: CLINIC | Age: 28
End: 2023-05-19

## 2023-05-19 NOTE — TELEPHONE ENCOUNTER
Contacted [facility], spoke to [facility] she advised refill to soon ; 5/24  Consent will be needed at that time  Botox member assigned to pt will continue to follow with sp

## 2023-05-22 NOTE — TELEPHONE ENCOUNTER
Benefits investigation received, placed at  to be scanned into chart    [facility] message sent to pt.

## 2023-06-02 NOTE — TELEPHONE ENCOUNTER
We are waiting for pt to advise whether she would like to proceed with infusion as there was questions about out of pocket cost.

## 2023-06-02 NOTE — TELEPHONE ENCOUNTER
Hi Clinical team,   I am not sure what happened to these orders. Pt was agreeable to V infusion, but I guess never done?   Was this approved from what you see? Thanks for your help.      Maged

## 2023-07-05 ENCOUNTER — TELEPHONE (OUTPATIENT)
Dept: NEUROLOGY | Facility: CLINIC | Age: 28
End: 2023-07-05

## 2023-07-05 NOTE — TELEPHONE ENCOUNTER
Pt states the 14th @3pm is not going to work if there is anything earlier in the morning or later in the day    Please give her a call back

## 2023-07-05 NOTE — TELEPHONE ENCOUNTER
Left msg to get her in sooner to reauth her infusion. Put a hold on July 14th at 3pm to use ok'd by [facility].

## 2023-07-05 NOTE — TELEPHONE ENCOUNTER
Patient called to reschedule her missed appointment from 7-5-23 at 730 am because she forgot about it. I offered her the next available in [facility] as she specifally asked for that location on 9-29-23 at 730 am with Martina Aaron and added her to the wait list and she asked that we try to get her in sooner because she has an infusion scheduled for 7-24-23 and she needs a script for it. I informed I will send a message to the provider's team to advised on a sooner date and they will call her back.     #[phone]

## 2023-07-06 ENCOUNTER — OFFICE VISIT (OUTPATIENT)
Dept: URGENT CARE | Facility: CLINIC | Age: 28
End: 2023-07-06
Payer: COMMERCIAL

## 2023-07-06 VITALS
TEMPERATURE: 98.2 F | DIASTOLIC BLOOD PRESSURE: 99 MMHG | OXYGEN SATURATION: 99 % | SYSTOLIC BLOOD PRESSURE: 161 MMHG | HEART RATE: 84 BPM | RESPIRATION RATE: 16 BRPM

## 2023-07-06 DIAGNOSIS — R68.84 JAW PAIN: ICD-10-CM

## 2023-07-06 DIAGNOSIS — H92.01 EAR PAIN, RIGHT: Primary | ICD-10-CM

## 2023-07-06 PROCEDURE — 99213 OFFICE O/P EST LOW 20 MIN: CPT | Performed by: NURSE PRACTITIONER

## 2023-07-06 RX ORDER — SUCRALFATE 1 G/1
TABLET ORAL
COMMUNITY
Start: 2023-07-01

## 2023-07-06 RX ORDER — ESOMEPRAZOLE MAGNESIUM 40 MG/1
CAPSULE, DELAYED RELEASE ORAL
COMMUNITY
Start: 2023-07-01

## 2023-07-06 RX ORDER — FERROUS SULFATE 325(65) MG
1 TABLET ORAL
COMMUNITY

## 2023-07-06 RX ORDER — METOPROLOL SUCCINATE 25 MG/1
0.5 TABLET, EXTENDED RELEASE ORAL DAILY
COMMUNITY
Start: 2023-05-06

## 2023-07-06 RX ORDER — AMOXICILLIN AND CLAVULANATE POTASSIUM 875; 125 MG/1; MG/1
1 TABLET, FILM COATED ORAL EVERY 12 HOURS SCHEDULED
Qty: 20 TABLET | Refills: 0 | Status: SHIPPED | OUTPATIENT
Start: 2023-07-06 | End: 2023-07-16

## 2023-07-06 RX ORDER — CHOLECALCIFEROL (VITAMIN D3) 1250 MCG
CAPSULE ORAL
COMMUNITY
Start: 2023-05-08

## 2023-07-06 RX ORDER — METOPROLOL SUCCINATE 25 MG/1
TABLET, EXTENDED RELEASE ORAL
COMMUNITY
Start: 2023-06-21

## 2023-07-06 NOTE — TELEPHONE ENCOUNTER
Spoke with pt and told her [address] has no other spots available before the July 24th, only the spot I gave her, pt didn't want the spot. She also had asked to have [address] let the infusion center know to take Benadryl for her infusion appt.

## 2023-07-06 NOTE — TELEPHONE ENCOUNTER
Ordered benadryl in therapy plan, but pt does not have sched infusion date. Clinical can you assist with this?

## 2023-07-06 NOTE — PROGRESS NOTES
[facility]        NAME: Amrita Gregg is a 32 y.o. female  : 1995    MRN: [id number]  DATE: 2023  TIME: 4:27 PM    Assessment and Plan   Ear pain, right [H92.01]  1. Ear pain, right  amoxicillin-clavulanate (AUGMENTIN) 875-125 mg per tablet      2. Jaw pain  amoxicillin-clavulanate (AUGMENTIN) 875-125 mg per tablet        Noted infection of the left ear however also with some tenderness swelling of the gingiva-patient is scheduled with dentist-also with headache scheduled tomorrow for Toradol injection-we will start patient on Augmentin twice daily x10 days which will hit both the mouth and ear. Educated with worsening of symptoms no improvement follow-up with PCP    Patient Instructions     Start Augmentin twice daily x10 days educated on side effects proper use of medication. Follow-up with dentist.  And with neurology as needed  Follow up with PCP in 3-5 days or with worsening of symptoms no improvement. Proceed to  ER if symptoms worsen-Red flags discussed. Chief Complaint     Chief Complaint   Patient presents with   • Migraine     Pt reports migraine on left side of face x1 day and pain that radiates down face, into jaw unrelieved by Nurtec. Also reports random flare-ups of red spots on neck and sometimes left arm sometimes relieved by Benadryl and sometimes not. Also reports left ear pressure         History of Present Illness       Patient is a 29-year-old female arrives with complaints of left-sided jaw pain pressure headache and left ear pain. Patient reports no relief using Nurtec. Is scheduled for Toradol injection tomorrow. Does report mild improvement in headache now. Does have some issues with her teeth is seeing a dentist.      Review of Systems   Review of Systems   Constitutional: Negative for activity change, appetite change, chills, fatigue and fever. HENT: Positive for dental problem (Left-sided back lower molar) and ear pain (Left).  Negative for congestion, postnasal drip, rhinorrhea, sneezing and sore throat. Respiratory: Negative for cough, chest tightness, shortness of breath and wheezing. Cardiovascular: Negative for chest pain and palpitations. Gastrointestinal: Negative for abdominal pain, constipation, diarrhea, nausea and vomiting. Musculoskeletal: Negative for arthralgias and myalgias. Skin: Negative for color change, pallor and rash. Neurological: Positive for headaches. Negative for dizziness, weakness and light-headedness. Hematological: Negative for adenopathy. Psychiatric/Behavioral: Negative for agitation and confusion. Current Medications       Current Outpatient Medications:   •  amoxicillin-clavulanate (AUGMENTIN) 875-125 mg per tablet, Take 1 tablet by mouth every 12 (twelve) hours for 10 days, Disp: 20 tablet, Rfl: 0  •  Cholecalciferol 1.25 MG (67821 UT) capsule, TAKE 1,250 MCG BY MOUTH EVERY 7 DAYS FOR 8 DOSES. 8 WEEKS., Disp: , Rfl:   •  metoprolol succinate (TOPROL-XL) 25 mg 24 hr tablet, Take 0.5 tablets by mouth daily, Disp: , Rfl:   •  Cholecalciferol (Vitamin D3) 1.25 MG (22387 UT) CAPS, TAKE 1,250 MCG BY MOUTH EVERY 7 DAYS FOR 8 DOSES. 8 WEEKS., Disp: , Rfl:   •  Cholecalciferol 50 MCG (2000 UT) CAPS, Take 2,000 Units by mouth daily, Disp: , Rfl:   •  [facility] Senna 8.6 MG tablet, Take 1 tablet by mouth if needed, Disp: , Rfl:   •  dexamethasone (DECADRON) 1 mg tablet, 1 tab qam with food 1 day prior to botox, day of botox and 1 day after botox, Disp: 10 tablet, Rfl: 2  •  divalproex sodium (DEPAKOTE) 250 mg EC tablet, 2 tabs q12 h x 2 days, then 1 tab q12 h x 2 days, then stop. Repeat if needed. , Disp: 12 tablet, Rfl: 1  •  EPINEPHrine (EPIPEN) 0.3 mg/0.3 mL SOAJ, Inject 0.3 mL (0.3 mg total) into a muscle once for 1 dose, Disp: 0.6 mL, Rfl: 0  •  esomeprazole (NexIUM) 40 MG capsule, , Disp: , Rfl:   •  ferrous sulfate 325 (65 Fe) mg tablet, Take 1 tablet by mouth daily with breakfast, Disp: , Rfl:   •  gabapentin (NEURONTIN) 100 mg capsule, 200 mg qam and 300 mg qhs, Disp: 150 capsule, Rfl: 3  •  levothyroxine 50 mcg tablet, Take 50 mcg by mouth daily, Disp: , Rfl:   •  metoclopramide (REGLAN) 5 mg tablet, Take 5 mg by mouth if needed, Disp: , Rfl:   •  metoprolol succinate (TOPROL-XL) 25 mg 24 hr tablet, TAKE 1 TABLET (25 MG TOTAL) BY MOUTH DAILY. , Disp: , Rfl:   •  norethindrone (MICRONOR) 0.35 MG tablet, daily, Disp: , Rfl:   •  nortriptyline (PAMELOR) 10 mg capsule, Take 10 mg by mouth daily at bedtime, Disp: , Rfl:   •  OLANZapine (ZyPREXA) 5 mg tablet, Take 1 tablet (5 mg total) by mouth daily at bedtime for 5 days, Disp: 5 tablet, Rfl: 0  •  ondansetron (ZOFRAN) 4 mg tablet, 1 tab q8 hours prn nausea/ vomiting, Disp: 20 tablet, Rfl: 2  •  polyethylene glycol (MIRALAX) 17 g packet, Take 17 g by mouth as needed , Disp: , Rfl:   •  Prucalopride Succinate 2 MG TABS, Take 1 tablet by mouth daily, Disp: , Rfl:   •  Rimegepant Sulfate (NURTEC) 75 MG TBDP, Take Nurtec every other day.  If pt gets a migraine on the days not scheduled to take the pill, pt may take it then skip the next day., Disp: 16 tablet, Rfl: 11  •  sucralfate (CARAFATE) 1 g tablet, , Disp: , Rfl:     Current Facility-Administered Medications:   •  ketorolac (TORADOL) injection 60 mg, 60 mg, Intramuscular, Once, Alen Jones PA-C    Current Allergies     Allergies as of 07/06/2023 - Reviewed 07/06/2023   Allergen Reaction Noted   • Cyclobenzaprine Tachycardia 06/30/2014   • Diazepam GI Intolerance, Abdominal Pain, and Other (See Comments) 06/30/2014   • Fentanyl Tachycardia 11/11/2019   • Fremanezumab Hives 02/27/2023   • Medical tape Other (See Comments) 03/19/2020   • Midazolam Other (See Comments) 01/07/2022   • Prednisone Other (See Comments) 08/31/2021   • Ginger - food allergy Rash 06/30/2020   • Melatonin Palpitations 01/12/2022            The following portions of the patient's history were reviewed and updated as appropriate: allergies, current medications, past family history, past medical history, past social history, past surgical history and problem list.     Past Medical History:   Diagnosis Date   • Hashimoto's disease 11/09/2022   • IBS (irritable bowel syndrome)    • Kidney stones        Past Surgical History:   Procedure Laterality Date   • CHOLECYSTECTOMY     • HIP SURGERY  01/2022    [address] [facility]       History reviewed. No pertinent family history. Medications have been verified. Objective   /99   Pulse 84   Temp 98.2 °F (36.8 °C)   Resp 16   SpO2 99%   No LMP recorded. Physical Exam     Physical Exam  Vitals and nursing note reviewed. Constitutional:       General: She is not in acute distress. Appearance: Normal appearance. She is not ill-appearing or diaphoretic. HENT:      Head: Normocephalic and atraumatic. Right Ear: Hearing, tympanic membrane, ear canal and external ear normal.      Left Ear: Swelling and tenderness present. Tympanic membrane is erythematous and bulging. Nose: No congestion or rhinorrhea. Mouth/Throat:      Dentition: Abnormal dentition. Dental caries present. Eyes:      General: No scleral icterus. Right eye: No discharge. Left eye: No discharge. Cardiovascular:      Rate and Rhythm: Normal rate and regular rhythm. Pulmonary:      Effort: Pulmonary effort is normal. No respiratory distress. Breath sounds: Normal breath sounds. Musculoskeletal:         General: Normal range of motion. Cervical back: Normal range of motion. Skin:     Coloration: Skin is not jaundiced or pale. Findings: No bruising, erythema or rash. Neurological:      General: No focal deficit present. Mental Status: She is alert and oriented to person, place, and time. Psychiatric:         Mood and Affect: Mood normal.         Behavior: Behavior normal.         Thought Content:  Thought content normal.         Judgment: Judgment normal.

## 2023-07-07 ENCOUNTER — CLINICAL SUPPORT (OUTPATIENT)
Dept: NEUROLOGY | Facility: CLINIC | Age: 28
End: 2023-07-07
Payer: COMMERCIAL

## 2023-07-07 DIAGNOSIS — G43.709 CHRONIC MIGRAINE WITHOUT AURA WITHOUT STATUS MIGRAINOSUS, NOT INTRACTABLE: ICD-10-CM

## 2023-07-07 PROCEDURE — 96372 THER/PROPH/DIAG INJ SC/IM: CPT | Performed by: PSYCHIATRY & NEUROLOGY

## 2023-07-07 RX ADMIN — KETOROLAC TROMETHAMINE 60 MG: 30 INJECTION, SOLUTION INTRAMUSCULAR; INTRAVENOUS at 07:31

## 2023-07-07 NOTE — TELEPHONE ENCOUNTER
vyepti PA completed in April and approved till 7/27/23    Called pt, she wants to proceed with vyepti infusion and says that she is already scheduled for 7/24.     Reviewed appt desk and pt is scheduled for 7/24 in [place]    Nothing further needed

## 2023-07-07 NOTE — PROGRESS NOTES
Procedures  Patient presented today for her Toradol injection. I injected 60mg of Toradol into her buttocks. Injection was tolerated well. She will follow up with office with any question or concerns.

## 2023-07-24 ENCOUNTER — HOSPITAL ENCOUNTER (OUTPATIENT)
Dept: INFUSION CENTER | Facility: HOSPITAL | Age: 28
Discharge: HOME | End: 2023-07-24
Attending: PSYCHIATRY & NEUROLOGY
Payer: COMMERCIAL

## 2023-07-24 ENCOUNTER — TELEPHONE (OUTPATIENT)
Dept: NEUROLOGY | Facility: CLINIC | Age: 28
End: 2023-07-24

## 2023-07-24 VITALS
DIASTOLIC BLOOD PRESSURE: 86 MMHG | HEART RATE: 89 BPM | RESPIRATION RATE: 18 BRPM | TEMPERATURE: 97.8 F | SYSTOLIC BLOOD PRESSURE: 130 MMHG

## 2023-07-24 DIAGNOSIS — G43.709 CHRONIC MIGRAINE WITHOUT AURA WITHOUT STATUS MIGRAINOSUS, NOT INTRACTABLE: ICD-10-CM

## 2023-07-24 DIAGNOSIS — G43.109 MIGRAINE WITH AURA AND WITHOUT STATUS MIGRAINOSUS, NOT INTRACTABLE: Primary | ICD-10-CM

## 2023-07-24 PROCEDURE — 96365 THER/PROPH/DIAG IV INF INIT: CPT

## 2023-07-24 PROCEDURE — 96367 TX/PROPH/DG ADDL SEQ IV INF: CPT

## 2023-07-24 RX ORDER — SODIUM CHLORIDE 9 MG/ML
20 INJECTION, SOLUTION INTRAVENOUS ONCE
Status: COMPLETED | OUTPATIENT
Start: 2023-07-24 | End: 2023-07-24

## 2023-07-24 RX ORDER — SODIUM CHLORIDE 9 MG/ML
20 INJECTION, SOLUTION INTRAVENOUS ONCE
OUTPATIENT
Start: 2023-07-25

## 2023-07-24 RX ADMIN — EPTINEZUMAB-JJMR 100 MG: 100 INJECTION INTRAVENOUS at 10:29

## 2023-07-24 RX ADMIN — DIPHENHYDRAMINE HYDROCHLORIDE 50 MG: 50 INJECTION, SOLUTION INTRAMUSCULAR; INTRAVENOUS at 09:54

## 2023-07-24 RX ADMIN — SODIUM CHLORIDE 20 ML/HR: 0.9 INJECTION, SOLUTION INTRAVENOUS at 09:53

## 2023-07-24 NOTE — TELEPHONE ENCOUNTER
YEISON 7/24/23 at 12:06 pm, recmarquis'd at 16:07:    Hi this is Hamilton Kahn. My birthday is 1995. I am calling because I had the Vyepti infusion this morning and maybe not even 30 to 40 minutes later I noticed my lip started swelling. I didn't know if this was a side effect? Or if I should take more Benadryl. They did give me Benadryl this morning before the infusion. If someone could give me a call back, that would be great. Thanks, bye      Called patient back, left message to call office with update. Also left detailed message if swelling continues, throat itching or feel like it's closing, difficulty breathing, rash, hives to seek ER care or call 911.       MONY--fyi  Team 5 please follow up with patient as she did not answer her phone

## 2023-07-24 NOTE — TELEPHONE ENCOUNTER
Agree with taking benadryl 50 mg tonight just in case. Agree with ED for the listed reasons. Thanks.

## 2023-07-25 NOTE — TELEPHONE ENCOUNTER
Called and advised pt of the below. She verbalized clear understanding. Pt states that she took benadryl last night and today. Her symptoms are better but throat is still itchy. It booker feel like she has a sore throat. Pt wants to know if you still want her to continue Vyepti every 3 months?    Cb [phone], ok to leave a detailed message

## 2023-07-25 NOTE — TELEPHONE ENCOUNTER
Please let us know if it works, and if helpful in reducing migraines we can consider doing next round with steroid and benadryl. Would only consider doing it again if it made a huge difference. Thanks.

## 2023-09-12 ENCOUNTER — TELEPHONE (OUTPATIENT)
Dept: NEUROLOGY | Facility: CLINIC | Age: 28
End: 2023-09-12

## 2023-09-12 NOTE — TELEPHONE ENCOUNTER
pt scheduled for vyepti on 10/16. PA  . Pt had infusion on  and had possible reaction. See encounter from . Per encounter-Please let us know if it works, and if helpful in reducing migraines we can consider doing next round with steroid and benadryl. Would only consider doing it again if it made a huge difference. Thanks. Called pt. States that she hasn't had any migraines since the infusion and would like to continue infusion. Form completed and scanned under media. Form faxed along with this encounter and last office note. Office note and encounter faxed separately from PA form.     Everything was faxed eletronically via Epic    MK-please enter steroid order if appropriate

## 2023-09-13 NOTE — TELEPHONE ENCOUNTER
I usually use the hypersensitivity protocol in [place]. Think it is benadryl and solumedrol.   But I just add the pre-made plan

## 2023-09-18 NOTE — TELEPHONE ENCOUNTER
9/15/23 at 5:17    Yes hi, good evening. This message is for Sarahy Tellez. My name is Samina Garcia. I'm a care management coordinator calling from [employer] on behalf of independent administrators regarding the request for [employer]. YOB: 1995. We did receive that request for the Vyepti and I do have to send that request over to the medical director and the request has been denied is not medically necessary. When services are the medical director states that when services are safe to give in various settings, we only cover them when they are given in the least costly setting that meets the medical needs. Medical records were submitted and reviewed by the medical director and show that the member does have migraines. She needs the drug to help prevent these headaches. However, the doctor wants to get the drug in the outpatient hospital setting. Record showed that you had one dose of this drug in July of 2023, and may have had a reaction. We did attempt to get details about what the reaction was, what happenned, but we did not receive any clinical information. With more information, this may be approved. However with current records, it is not clear that another less least less costly setting is not safe. Looking the infusion center, the doctor's office or the home setting. That being said, you do have the option of a peer to peer review. That process is started by calling [phone]. The peer to peer extension is [phone]. If you need to have available to dates and times and contact information for the requesting physician or their delegate to speak with the medical director, you also have the option of appeal that process is started via fax to [phone], if you do go directly to appeal, you do lose a right to a peer to peer review. I did attempt to fax this to adverse determination on 2 separate occasions. Unfortunately, it kept saying that it was disconnected in progress.  You will get a hard copy in the mail. Thank you so much and have a great day. [employer] [phone]    Called [phone], spoke to rep who transferred me to another dept. Spoke to Pat Staff and states that their system is down.  Will call back later

## 2023-09-19 NOTE — TELEPHONE ENCOUNTER
Called [phone], spoke to Eitan and I was transferred to [facility].  Left a message on the answering machine for a call back

## 2023-09-21 NOTE — TELEPHONE ENCOUNTER
Called [phone], spoke to rep who transferred me to [phone]. Spoke to BHUPINDER who transferred me to another dept.  Waited for 10 min and got disconnected    Total min for this call is 57 min

## 2023-09-28 NOTE — TELEPHONE ENCOUNTER
Lauren Aguilar from [facility] called today asking if pt was already infused with the medication if so where is she being infused. Please call cell  [phone].

## 2023-09-29 NOTE — TELEPHONE ENCOUNTER
Amita from [facility] returning a call about patient infusion if medicine was given  and where this is done. Please give Amita a call at [phone].

## 2023-09-29 NOTE — TELEPHONE ENCOUNTER
Called [phone] and left a message on theanswering machine for a call back    Called [phone], spoke to rep Mejia and advised of the below. I was transferred me to [phone]. Spoke to Saqib Miner who transferred me to  [phone]. Spoke to Janis and advised of the below.  I was placed on hold for about 20 min

## 2023-10-03 ENCOUNTER — TELEPHONE (OUTPATIENT)
Dept: NEUROLOGY | Facility: CLINIC | Age: 28
End: 2023-10-03

## 2023-10-03 NOTE — TELEPHONE ENCOUNTER
MK-before we call insurance back. Would you be ok with pt having home infusion with potential reaction after 1st infusion.     See 7/24 encounter regarding potential reaction    Pt next infusion is currently scheduled for 10/16 at the infusion center

## 2023-10-03 NOTE — TELEPHONE ENCOUNTER
received vm from 10/2 at 1:04pm-This is [facility] calling to find out if a mutual patient is still on botox, we have not been able to reach this patients. And her name is Farhat Ratliff, [birth year] is the date of birth. Please give us a call back at your earliest convenience at [phone]. Option 2. Any representative here can assist. If we don't hear from you soon, the prescription will have to be discontinued. Thank you so much and have a one to.  -----------------------------------------------  Per encounter from 5/19-[facility]    6/23/23  8:29 AM  Note     Patient coming in for Office Visit on 6/26/23 to discuss other options since botox was ineffective and patient wanted to cancel this appointment. [facility] and spoke to [address].   made her aware that pt is no longer receiving botox

## 2023-10-03 NOTE — TELEPHONE ENCOUNTER
Called [phone]-ERCZUNJJDIWD [facility]  Spoke to rep and she transferred care management [phone]  Spoke to Nikhil, states that she is with [employer], pt has independence administrators. Call then transferred, spoke to dean. States that I need to contact pt care management at [phone] option 5     Called pt care management at [phone] ext [phone] (peer to peer) per automated system, number has changed to [phone] and call disconnected. Called [phone] again ext [phone]. Call disconnected    Called again option 5, per automated system, new number for peer to peer is [phone] option 3, spoke to marta. States that Peer to peer needs to be completed by dr, PA or NP and we Can call back and leave contract number and times available to complete peer to peer. They are available between 9am-4:30    Please advise on your availability to complete peer to peer and what phone number should they contact?

## 2023-10-03 NOTE — TELEPHONE ENCOUNTER
I contact [address] to see what availability she had on thurday. Thurs she would be available between 1-2 or after 4  And would like them to call the [facility]  at [phone] option 3, left message with available dates and times.   Asked that they contact [facility]

## 2023-10-03 NOTE — TELEPHONE ENCOUNTER
I do not feel comfortable with her getting infusion at home after reaction after the first infusion. Is her insurance not covering it in the infusion center?

## 2023-10-04 NOTE — TELEPHONE ENCOUNTER
Received VM transcription from 10/3/23, 4:48 PM:    Hi, good afternoon. This message is for Tin Taylor. It is now 4:47 PM on 10/3/2023 calling back in reference to [facility]. YOB: 1995. Calling reference to a peer to peer. You left the message you want to set up a peer to peer. Peer to peer is no longer needed. If possible, you can call us back so we can give you the information. My name is Louisalars Patten. I am calling from the peer to peer department. You can call back at [phone]. Thanks in advance and have a great rest of your day.

## 2023-10-04 NOTE — TELEPHONE ENCOUNTER
Called [phone] and spoke to SU.  States that they will be contacting [address] to completed peer to peer but she is not sure when     [address]- they should be contacting you to do peer to peer at some point

## 2023-10-10 NOTE — TELEPHONE ENCOUNTER
Dr. Gregory Spurling did ptp, he states he just needed documentation of what the allergy was during last IV infusion of vyepti. I provided info that she had some itching, itchy throat and lip swelling, and IV benadryl given. She also took PO benadryl at home. Outpatient infusion more expensive than at home, but once this is documented it should be approved.  says the approval letter should go through shortly.

## 2023-10-10 NOTE — TELEPHONE ENCOUNTER
Duran Loaiza never received call to complete peer to peer. She is available today 10/10 at 12:15, and every other day this week after 4pm.  Today she is in [place] office and the rest of the week she is in [place]. Called [phone] option 3 and spoke to lalita. States that they tried to contact a dr Angela Rockwell at a 36 phone number. Advised that this is not our provider and not the phone number that I gave them. Made him aware of MK's availability, he states that the 12:15 is not good as the medical directors go to lunch from 12-1. Gave him MK's availability for the rest of the week and gave him   number ([phone]) to contact.   Advised that pt is scheduled for infusion on 10/16 and asking that this be completed as soon as possible  Ref # for [id number]

## 2023-10-11 NOTE — TELEPHONE ENCOUNTER
[facility] 10/10/23 4:40pm, taken off 10/11/23:  Hi, this message is for [facility]. My name is Danni Gurrola. I'm a care management coordinator calling from [employer]. Calling regarding a Lance Mulling. YOB: 1995. The peer to peer did take place for her Vyepti. They have approved a one time does for 100 units at the hospital setting. The authorization for this 100 unit dose is [id number]. Thank you and have a wonderful day.

## 2023-10-16 ENCOUNTER — HOSPITAL ENCOUNTER (OUTPATIENT)
Dept: INFUSION CENTER | Facility: HOSPITAL | Age: 28
Discharge: HOME | End: 2023-10-16
Attending: PSYCHIATRY & NEUROLOGY
Payer: COMMERCIAL

## 2023-10-16 VITALS
SYSTOLIC BLOOD PRESSURE: 137 MMHG | TEMPERATURE: 97.4 F | RESPIRATION RATE: 18 BRPM | HEART RATE: 109 BPM | DIASTOLIC BLOOD PRESSURE: 98 MMHG

## 2023-10-16 DIAGNOSIS — G43.109 MIGRAINE WITH AURA AND WITHOUT STATUS MIGRAINOSUS, NOT INTRACTABLE: ICD-10-CM

## 2023-10-16 DIAGNOSIS — G43.709 CHRONIC MIGRAINE WITHOUT AURA WITHOUT STATUS MIGRAINOSUS, NOT INTRACTABLE: Primary | ICD-10-CM

## 2023-10-16 RX ORDER — SODIUM CHLORIDE 9 MG/ML
20 INJECTION, SOLUTION INTRAVENOUS ONCE
OUTPATIENT
Start: 2023-10-17

## 2023-10-16 RX ORDER — SODIUM CHLORIDE 9 MG/ML
20 INJECTION, SOLUTION INTRAVENOUS ONCE
Status: COMPLETED | OUTPATIENT
Start: 2023-10-16 | End: 2023-10-16

## 2023-10-16 RX ADMIN — HYDROCORTISONE SODIUM SUCCINATE 50 MG: 100 INJECTION, POWDER, FOR SOLUTION INTRAMUSCULAR; INTRAVENOUS at 09:16

## 2023-10-16 RX ADMIN — EPTINEZUMAB-JJMR 100 MG: 100 INJECTION INTRAVENOUS at 09:55

## 2023-10-16 RX ADMIN — SODIUM CHLORIDE 20 ML/HR: 0.9 INJECTION, SOLUTION INTRAVENOUS at 09:50

## 2023-10-16 RX ADMIN — DIPHENHYDRAMINE HYDROCHLORIDE 50 MG: 50 INJECTION, SOLUTION INTRAMUSCULAR; INTRAVENOUS at 09:16

## 2023-10-19 DIAGNOSIS — G43.709 CHRONIC MIGRAINE WITHOUT AURA WITHOUT STATUS MIGRAINOSUS, NOT INTRACTABLE: ICD-10-CM

## 2023-10-19 DIAGNOSIS — G43.109 MIGRAINE WITH AURA AND WITHOUT STATUS MIGRAINOSUS, NOT INTRACTABLE: Primary | ICD-10-CM

## 2023-10-23 NOTE — TELEPHONE ENCOUNTER
Agustin Warren   to [id number] Team 5 (supporting You)         10/23/23 11:52 AM  Hello,     No everything went really well!! The steriod really well!

## 2023-10-23 NOTE — TELEPHONE ENCOUNTER
Edwin Castro, are you agreeable to pt having future vyepti infusion at home? You should be able to order benadryl and hydrocortisone to be given with home infusion if you would like to. This would just need to be noted on orders.     Please advise

## 2023-10-23 NOTE — TELEPHONE ENCOUNTER
Approval letter received but only approved for 100units(1 infusion). Insurance previously wanted pt to have infusions in least costly setting. [employer] message sent to pt to see how she tolerated recent infusion.

## 2023-10-24 NOTE — TELEPHONE ENCOUNTER
I confirmed with rachel from [facility] yesterday that they can do IV benadryl and IV hydrocortisone with home infusion

## 2023-10-24 NOTE — TELEPHONE ENCOUNTER
Mk I printed Vyepti order at . Pls complete the paper order then ask MA to fax it to [facility] at [phone].     Thank you

## 2023-10-26 NOTE — TELEPHONE ENCOUNTER
Form was completed by [address] [facility] and faxed over to [facility] pharmacy. Scanned into media manager.

## 2023-11-02 NOTE — TELEPHONE ENCOUNTER
Recevied teams message-  [12:57 PM] Erna Hernandez. I am not in network with [employer] 93 insurance for vyepti. I then asked her if she knew who was in network. Her response-  [1:01 PM] Vishal Whalen am not sure for that one. I would try Z[facility] Mackay or [facility] [address] [facility] [phone] or [address]  [address].     Will work on this tomorrow

## 2023-11-03 NOTE — TELEPHONE ENCOUNTER
Per independence administrators website, option care is an option for home infusion. [facility] [phone]  Steffany Love- can you please fax vypeti paper order that is scanned under media from 10/26,  also fax demographics and last office note to [facility] at [phone].   Please note on cover sheet that pt's   Last infusion was on 10/16    Please send back to clinical once this is faxed

## 2023-11-03 NOTE — TELEPHONE ENCOUNTER
Will contact [facility] care next week to confirm they received fax

## 2023-11-04 ENCOUNTER — OCCMED (OUTPATIENT)
Dept: URGENT CARE | Facility: CLINIC | Age: 28
End: 2023-11-04

## 2023-11-04 ENCOUNTER — APPOINTMENT (OUTPATIENT)
Dept: RADIOLOGY | Facility: CLINIC | Age: 28
End: 2023-11-04

## 2023-11-04 ENCOUNTER — APPOINTMENT (OUTPATIENT)
Dept: URGENT CARE | Facility: CLINIC | Age: 28
End: 2023-11-04

## 2023-11-04 DIAGNOSIS — Z11.1 SCREENING FOR TUBERCULOSIS: Primary | ICD-10-CM

## 2023-11-04 PROCEDURE — 71046 X-RAY EXAM CHEST 2 VIEWS: CPT

## 2023-11-08 NOTE — TELEPHONE ENCOUNTER
Called [facility] care at [phone] option 2. Spoke to Capri.   States the Intake team is in a meeting currently and she could transfer me to managers yves Rodriguez    Left message for rogelio to call our office back

## 2023-12-23 ENCOUNTER — OFFICE VISIT (OUTPATIENT)
Dept: URGENT CARE | Facility: CLINIC | Age: 28
End: 2023-12-23
Payer: COMMERCIAL

## 2023-12-23 VITALS
RESPIRATION RATE: 18 BRPM | TEMPERATURE: 98.6 F | SYSTOLIC BLOOD PRESSURE: 118 MMHG | WEIGHT: 146.8 LBS | BODY MASS INDEX: 29.65 KG/M2 | HEART RATE: 77 BPM | DIASTOLIC BLOOD PRESSURE: 72 MMHG | OXYGEN SATURATION: 99 %

## 2023-12-23 DIAGNOSIS — H10.32 ACUTE CONJUNCTIVITIS OF LEFT EYE, UNSPECIFIED ACUTE CONJUNCTIVITIS TYPE: Primary | ICD-10-CM

## 2023-12-23 PROCEDURE — 99213 OFFICE O/P EST LOW 20 MIN: CPT | Performed by: PHYSICIAN ASSISTANT

## 2023-12-23 RX ORDER — MEDROXYPROGESTERONE ACETATE 10 MG/1
20 TABLET ORAL DAILY
COMMUNITY
Start: 2023-10-20

## 2023-12-23 RX ORDER — POLYMYXIN B SULFATE AND TRIMETHOPRIM 1; 10000 MG/ML; [USP'U]/ML
1 SOLUTION OPHTHALMIC EVERY 4 HOURS
Qty: 10 ML | Refills: 0 | Status: SHIPPED | OUTPATIENT
Start: 2023-12-23 | End: 2023-12-30

## 2023-12-23 NOTE — PATIENT INSTRUCTIONS
Follow-up with your primary care provider in the next 3-5 days.  Any new or worsening symptoms develop get re-evaluated sooner or proceed to the ER.  Use drops as prescribed.

## 2023-12-23 NOTE — PROGRESS NOTES
[facility]        NAME: Renetta Meza is a 28 y.o. female  : 1995    MRN: [id number]  DATE: 2023  TIME: 8:40 AM    Assessment and Plan   Acute conjunctivitis of left eye, unspecified acute conjunctivitis type [H10.32]  1. Acute conjunctivitis of left eye, unspecified acute conjunctivitis type  polymyxin b-trimethoprim (POLYTRIM) ophthalmic solution            Patient Instructions       Follow up with PCP in 3-5 days.  Proceed to  ER if symptoms worsen.    Chief Complaint     Chief Complaint   Patient presents with    Eye Problem     Patient presents with complaints of itchy, watery left eye. This morning she woke up with her eye crusted shut.         History of Present Illness       Patient presents with 5 days ago developing watery eye over the left eye. 3 days ago started to become red.  Tried allergy drops without relief. This morning developed crusting around the eye.   Taking allegra and flonase. History of allergies.   Denies visual disturbances, painful eye movements, redness/swelling around the eye.   Does not wear contacts.     Eye Problem   Associated symptoms include an eye discharge, eye redness and itching. Pertinent negatives include no fever or photophobia.       Review of Systems   Review of Systems   Constitutional:  Negative for fever.   Eyes:  Positive for discharge, redness and itching. Negative for photophobia, pain and visual disturbance.         Current Medications       Current Outpatient Medications:     medroxyPROGESTERone (PROVERA) 10 mg tablet, Take 20 mg by mouth daily, Disp: , Rfl:     polymyxin b-trimethoprim (POLYTRIM) ophthalmic solution, Administer 1 drop into the left eye every 4 (four) hours for 7 days, Disp: 10 mL, Rfl: 0    Cholecalciferol (Vitamin D3) 1.25 MG (22103 UT) CAPS, TAKE 1,250 MCG BY MOUTH EVERY 7 DAYS FOR 8 DOSES. 8 WEEKS., Disp: , Rfl:     Cholecalciferol 1.25 MG (89772 UT) capsule, TAKE 1,250 MCG BY MOUTH EVERY 7 DAYS FOR 8 DOSES. 8  WEEKS., Disp: , Rfl:     Cholecalciferol 50 MCG (2000 UT) CAPS, Take 2,000 Units by mouth daily, Disp: , Rfl:     [employer] Senna 8.6 MG tablet, Take 1 tablet by mouth if needed, Disp: , Rfl:     dexamethasone (DECADRON) 1 mg tablet, 1 tab qam with food 1 day prior to botox, day of botox and 1 day after botox, Disp: 10 tablet, Rfl: 2    divalproex sodium (DEPAKOTE) 250 mg EC tablet, 2 tabs q12 h x 2 days, then 1 tab q12 h x 2 days, then stop. Repeat if needed., Disp: 12 tablet, Rfl: 1    EPINEPHrine (EPIPEN) 0.3 mg/0.3 mL SOAJ, Inject 0.3 mL (0.3 mg total) into a muscle once for 1 dose, Disp: 0.6 mL, Rfl: 0    esomeprazole (NexIUM) 40 MG capsule, , Disp: , Rfl:     ferrous sulfate 325 (65 Fe) mg tablet, Take 1 tablet by mouth daily with breakfast, Disp: , Rfl:     gabapentin (NEURONTIN) 100 mg capsule, 200 mg qam and 300 mg qhs, Disp: 150 capsule, Rfl: 3    levothyroxine 50 mcg tablet, Take 50 mcg by mouth daily, Disp: , Rfl:     metoclopramide (REGLAN) 5 mg tablet, Take 5 mg by mouth if needed, Disp: , Rfl:     metoprolol succinate (TOPROL-XL) 25 mg 24 hr tablet, Take 0.5 tablets by mouth daily, Disp: , Rfl:     metoprolol succinate (TOPROL-XL) 25 mg 24 hr tablet, TAKE 1 TABLET (25 MG TOTAL) BY MOUTH DAILY., Disp: , Rfl:     norethindrone (MICRONOR) 0.35 MG tablet, daily, Disp: , Rfl:     nortriptyline (PAMELOR) 10 mg capsule, Take 10 mg by mouth daily at bedtime, Disp: , Rfl:     OLANZapine (ZyPREXA) 5 mg tablet, Take 1 tablet (5 mg total) by mouth daily at bedtime for 5 days, Disp: 5 tablet, Rfl: 0    ondansetron (ZOFRAN) 4 mg tablet, 1 tab q8 hours prn nausea/ vomiting, Disp: 20 tablet, Rfl: 2    polyethylene glycol (MIRALAX) 17 g packet, Take 17 g by mouth as needed , Disp: , Rfl:     Prucalopride Succinate 2 MG TABS, Take 1 tablet by mouth daily, Disp: , Rfl:     Rimegepant Sulfate (NURTEC) 75 MG TBDP, Take Nurtec every other day. If pt gets a migraine on the days not scheduled to take the pill, pt may take it then  skip the next day., Disp: 16 tablet, Rfl: 11    sucralfate (CARAFATE) 1 g tablet, , Disp: , Rfl:     Current Allergies     Allergies as of 12/23/2023 - Reviewed 12/23/2023   Allergen Reaction Noted    Amoxicillin Diarrhea 07/10/2023    Cyclobenzaprine Tachycardia 06/30/2014    Diazepam GI Intolerance, Abdominal Pain, and Other (See Comments) 06/30/2014    Fentanyl Tachycardia 11/11/2019    Fremanezumab Hives 02/27/2023    Medical tape Other (See Comments) 03/19/2020    Midazolam Other (See Comments) 01/07/2022    Prednisone Other (See Comments) 08/31/2021    Laurie - food allergy Rash 06/30/2020    Melatonin Palpitations 01/12/2022            The following portions of the patient's history were reviewed and updated as appropriate: allergies, current medications, past family history, past medical history, past social history, past surgical history and problem list.     Past Medical History:   Diagnosis Date    Hashimoto's disease 11/09/2022    IBS (irritable bowel syndrome)     Kidney stones        Past Surgical History:   Procedure Laterality Date    CHOLECYSTECTOMY      HIP SURGERY  01/2022    [facility]-Dr. Villagomez       No family history on file.      Medications have been verified.        Objective   /72   Pulse 77   Temp 98.6 °F (37 °C) (Tympanic)   Resp 18   Wt 66.6 kg (146 lb 12.8 oz)   SpO2 99%   BMI 29.65 kg/m²   No LMP recorded.       Physical Exam     Physical Exam  Constitutional:       Appearance: Normal appearance.   Eyes:      Extraocular Movements: Extraocular movements intact.      Pupils: Pupils are equal, round, and reactive to light.      Comments: Mildly Injected and irritated left conjunctiva   Neurological:      Mental Status: She is alert.   Psychiatric:         Mood and Affect: Mood normal.         Behavior: Behavior normal.

## 2024-01-04 ENCOUNTER — TELEPHONE (OUTPATIENT)
Dept: NEUROLOGY | Facility: CLINIC | Age: 29
End: 2024-01-04

## 2024-01-04 NOTE — TELEPHONE ENCOUNTER
----- Message -----   From: Danita Damon   Sent: 1/4/2024   8:10 AM EST   To: Melisa Cortés RN; Iliana Purcell RN; *     Pt on inf schedule for 1/8, we need orders not sure for what and if they are paper or being placed in [facility]

## 2024-01-04 NOTE — TELEPHONE ENCOUNTER
See encounter from 9/12/23  Pt is receiving vyepti and we were trying to have pt receive home infusions thru [facility].    [facility]ed [facility] at [phone] [facility]. Spoke to philippe  States that pt is Scheduled for vypeti 1/18 with them.    Infusion appt cancelled with our infusion center and message sent to jerrica in infusion center

## 2024-01-08 ENCOUNTER — HOSPITAL ENCOUNTER (OUTPATIENT)
Dept: INFUSION CENTER | Facility: HOSPITAL | Age: 29
End: 2024-01-08
Attending: PSYCHIATRY & NEUROLOGY

## 2024-01-10 ENCOUNTER — TELEPHONE (OUTPATIENT)
Dept: NEUROLOGY | Facility: CLINIC | Age: 29
End: 2024-01-10

## 2024-01-12 NOTE — TELEPHONE ENCOUNTER
Called pt [phone] re: below. Someone answered but disconnected the call. Called again, spoke to pt re: below. States that the pain in her hands started last year but the last 6 months, the numbness and tingling in her upper extremities from shoulder down had gotten worse. It does not happen all at the same time. Sometimes. Her pinky and ring finger or thumb would go numb. Sometimes tingling in hands and arms. It normally last about 5 min or less than that. States that rheumatology did not find anything. No lupus or carpal tunnel. States that rheumatology increased her gabapentin. Starting tmrw, she will be taking Gabapentin 100 mg in the AM, 100 mg in the afternoon and 300 mg at bedtime. States that they tried to increase her gabapentin before and it made her tired in the morning. Rheumatology instructed her to schedule a f/u w/ her neurologist.   [facility] scheduled 1/15/24 at 0730 in [facility]

## 2024-01-15 ENCOUNTER — OFFICE VISIT (OUTPATIENT)
Dept: NEUROLOGY | Facility: CLINIC | Age: 29
End: 2024-01-15
Payer: COMMERCIAL

## 2024-01-15 VITALS
DIASTOLIC BLOOD PRESSURE: 71 MMHG | HEIGHT: 59 IN | SYSTOLIC BLOOD PRESSURE: 109 MMHG | BODY MASS INDEX: 30.52 KG/M2 | HEART RATE: 70 BPM | WEIGHT: 151.4 LBS | TEMPERATURE: 99.2 F

## 2024-01-15 DIAGNOSIS — G43.709 CHRONIC MIGRAINE WITHOUT AURA WITHOUT STATUS MIGRAINOSUS, NOT INTRACTABLE: ICD-10-CM

## 2024-01-15 DIAGNOSIS — M25.512 LEFT SHOULDER PAIN: ICD-10-CM

## 2024-01-15 DIAGNOSIS — R76.8 ANA POSITIVE: ICD-10-CM

## 2024-01-15 DIAGNOSIS — M54.12 LEFT CERVICAL RADICULOPATHY: Primary | ICD-10-CM

## 2024-01-15 PROCEDURE — 99215 OFFICE O/P EST HI 40 MIN: CPT | Performed by: PHYSICIAN ASSISTANT

## 2024-01-15 RX ORDER — KETOROLAC TROMETHAMINE 10 MG/1
10 TABLET, FILM COATED ORAL EVERY 6 HOURS PRN
Qty: 10 TABLET | Refills: 0 | Status: SHIPPED | OUTPATIENT
Start: 2024-01-15
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
## 2024-01-28 ENCOUNTER — HOSPITAL ENCOUNTER (OUTPATIENT)
Dept: RADIOLOGY | Facility: HOSPITAL | Age: 29
Discharge: HOME | End: 2024-01-28
Payer: COMMERCIAL

## 2024-01-28 DIAGNOSIS — M54.12 LEFT CERVICAL RADICULOPATHY: ICD-10-CM

## 2024-01-28 PROCEDURE — G1004 CDSM NDSC: HCPCS

## 2024-01-28 PROCEDURE — 72141 MRI NECK SPINE W/O DYE: CPT

## 2024-01-29 ENCOUNTER — TELEPHONE (OUTPATIENT)
Dept: NEUROLOGY | Facility: CLINIC | Age: 29
End: 2024-01-29

## 2024-01-29 NOTE — TELEPHONE ENCOUNTER
Received forms from Nicki Bender with  [facility], for treatment plan to be signed and faxed back over. MK signed forms, were faxed over and scanned into patients chart.

## 2024-02-01 ENCOUNTER — HOSPITAL ENCOUNTER (OUTPATIENT)
Dept: RADIOLOGY | Facility: HOSPITAL | Age: 29
End: 2024-02-01
Payer: COMMERCIAL

## 2024-02-01 DIAGNOSIS — M25.512 LEFT SHOULDER PAIN: ICD-10-CM

## 2024-02-01 PROCEDURE — 73030 X-RAY EXAM OF SHOULDER: CPT

## 2024-02-14 ENCOUNTER — APPOINTMENT (OUTPATIENT)
Dept: RADIOLOGY | Facility: CLINIC | Age: 29
End: 2024-02-14
Payer: OTHER MISCELLANEOUS

## 2024-02-14 ENCOUNTER — OCCMED (OUTPATIENT)
Dept: URGENT CARE | Facility: CLINIC | Age: 29
End: 2024-02-14
Payer: OTHER MISCELLANEOUS

## 2024-02-14 DIAGNOSIS — M79.602 PAIN OF LEFT UPPER EXTREMITY: ICD-10-CM

## 2024-02-14 DIAGNOSIS — M25.552 PAIN OF LEFT HIP: ICD-10-CM

## 2024-02-14 DIAGNOSIS — M25.562 ACUTE PAIN OF LEFT KNEE: ICD-10-CM

## 2024-02-14 DIAGNOSIS — M25.552 PAIN OF LEFT HIP: Primary | ICD-10-CM

## 2024-02-14 PROCEDURE — 73502 X-RAY EXAM HIP UNI 2-3 VIEWS: CPT

## 2024-02-14 PROCEDURE — 73090 X-RAY EXAM OF FOREARM: CPT

## 2024-02-14 PROCEDURE — 73564 X-RAY EXAM KNEE 4 OR MORE: CPT

## 2024-02-14 PROCEDURE — G0382 LEV 3 HOSP TYPE B ED VISIT: HCPCS

## 2024-02-14 PROCEDURE — 99283 EMERGENCY DEPT VISIT LOW MDM: CPT

## 2024-02-16 ENCOUNTER — APPOINTMENT (OUTPATIENT)
Dept: URGENT CARE | Facility: CLINIC | Age: 29
End: 2024-02-16
Payer: OTHER MISCELLANEOUS

## 2024-02-16 PROCEDURE — 99213 OFFICE O/P EST LOW 20 MIN: CPT

## 2024-02-24 ENCOUNTER — OFFICE VISIT (OUTPATIENT)
Dept: URGENT CARE | Facility: CLINIC | Age: 29
End: 2024-02-24
Payer: COMMERCIAL

## 2024-02-24 VITALS
HEART RATE: 99 BPM | DIASTOLIC BLOOD PRESSURE: 83 MMHG | OXYGEN SATURATION: 99 % | TEMPERATURE: 98.6 F | SYSTOLIC BLOOD PRESSURE: 130 MMHG | RESPIRATION RATE: 18 BRPM

## 2024-02-24 DIAGNOSIS — J06.9 ACUTE URI: Primary | ICD-10-CM

## 2024-02-24 PROCEDURE — 99213 OFFICE O/P EST LOW 20 MIN: CPT | Performed by: NURSE PRACTITIONER

## 2024-02-24 RX ORDER — BENZONATATE 200 MG/1
200 CAPSULE ORAL 3 TIMES DAILY PRN
Qty: 20 CAPSULE | Refills: 0 | Status: SHIPPED | OUTPATIENT
Start: 2024-02-24

## 2024-02-24 RX ORDER — AZITHROMYCIN 250 MG/1
TABLET, FILM COATED ORAL
Qty: 6 TABLET | Refills: 0 | Status: SHIPPED | OUTPATIENT
Start: 2024-02-24 | End: 2024-02-28

## 2024-02-24 NOTE — PROGRESS NOTES
[facility]        NAME: Renetta Meza is a 28 y.o. female  : 1995    MRN: [id number]  DATE: 2024  TIME: 2:09 PM    Assessment and Plan   Acute URI [J06.9]  1. Acute URI  azithromycin (ZITHROMAX) 250 mg tablet    benzonatate (TESSALON) 200 MG capsule            Patient Instructions     Patient here today with URI symptoms which are worsening despite conservative treatment.  Will send azithromycin and Tessalon Perles to pharmacy.  Discussed SE and use.     Increase fluid intake and get plenty of rest.       Please start medication which was sent to your pharmacy.       If you have nasal congestions, post nasal drip, sinus pressure, runny nose you may try the following:        Clearing your sinuses in a nice steamy shower or in bathroom filled with steamy air.     Nasal saline rinses every 1-2 hours while awake may also help decrease nasal congestion, drainage.     You may try Mucinex D 12 hour version.  1/2 to 1 tablet one to two times a day as needed for nasal congestion, runny nose, post nasal drip, or cough.     If you have sore / scratch / irritated throat, you may try the following:          Warm salt water gargles every 1-2 hours while awake, throat lozenges, Tylenol and/or ibuprofen.       Please note that a cough is not necessarily a bad thing.  It is the body's way of protecting the airways.  If a cough is keeping you from sleeping at night, you may use cough suppressant such as Delsym Cough Syrup, NyQuil, Robitussin DM.       Most upper respiratory symptoms start to improve after 7-10 days but may take a few weeks to completely resolve.     You may also use Ibuprofen or Acetaminophen containing product for symptom relief.   Follow up in 1 week if your symptoms persist or worsen.    Please call the office if you have any questions.   The patient verbalized understanding of treatment plan.     Follow up with PCP in 3-5 days.  Proceed to  ER if symptoms worsen.    Chief Complaint      Chief Complaint   Patient presents with   • Cough     Patient has had a cough, nasal congestion, chest congestion that started last week. Took 3 covid tests since her onset and all were negative         History of Present Illness       Patient here today with concerns of cough, chest congestion, throat irritation, postnasal drip, head congestion, rhinorrhea, fatigue due to disturbed sleeping from cough.  Patient reports 3 home COVID test which were negative.  Patient has been taking DayQuil and NyQuil, Tylenol/Motrin as needed for symptoms.  Despite conservative symptoms no improvement.  Patient denies fevers, body aches, chills, nausea, vomiting, diarrhea.    Cough  Associated symptoms include nasal congestion, postnasal drip, rhinorrhea and a sore throat (irritation). Pertinent negatives include no chest pain, chills, ear congestion, ear pain, fever, headaches, heartburn, rash, shortness of breath or wheezing. Exacerbated by: Dayquil/Nyquil. Her past medical history is significant for asthma and environmental allergies. There is no history of bronchiectasis, bronchitis, COPD, emphysema or pneumonia.       Review of Systems   Review of Systems   Constitutional:  Positive for fatigue. Negative for chills and fever.   HENT:  Positive for postnasal drip, rhinorrhea and sore throat (irritation). Negative for ear pain, sinus pressure, sinus pain and trouble swallowing.    Respiratory:  Positive for cough. Negative for shortness of breath and wheezing.    Cardiovascular:  Negative for chest pain.   Gastrointestinal:  Negative for abdominal distention, diarrhea, heartburn, nausea and vomiting.   Skin:  Negative for rash.   Allergic/Immunologic: Positive for environmental allergies.   Neurological:  Negative for headaches.         Current Medications       Current Outpatient Medications:   •  azithromycin (ZITHROMAX) 250 mg tablet, Take 2 tablets today then 1 tablet daily x 4 days, Disp: 6 tablet, Rfl: 0  •   benzonatate (TESSALON) 200 MG capsule, Take 1 capsule (200 mg total) by mouth 3 (three) times a day as needed for cough, Disp: 20 capsule, Rfl: 0  •  Cholecalciferol (Vitamin D3) 1.25 MG (48348 UT) CAPS, TAKE 1,250 MCG BY MOUTH EVERY 7 DAYS FOR 8 DOSES. 8 WEEKS. (Patient not taking: Reported on 1/15/2024), Disp: , Rfl:   •  Cholecalciferol 1.25 MG (31410 UT) capsule, TAKE 1,250 MCG BY MOUTH EVERY 7 DAYS FOR 8 DOSES. 8 WEEKS. (Patient not taking: Reported on 1/15/2024), Disp: , Rfl:   •  Cholecalciferol 50 MCG (2000 UT) CAPS, Take 2,000 Units by mouth daily (Patient not taking: Reported on 1/15/2024), Disp: , Rfl:   •  [employer] Senna 8.6 MG tablet, Take 1 tablet by mouth if needed, Disp: , Rfl:   •  dexamethasone (DECADRON) 1 mg tablet, 1 tab qam with food 1 day prior to botox, day of botox and 1 day after botox (Patient not taking: Reported on 1/15/2024), Disp: 10 tablet, Rfl: 2  •  divalproex sodium (DEPAKOTE) 250 mg EC tablet, 2 tabs q12 h x 2 days, then 1 tab q12 h x 2 days, then stop. Repeat if needed. (Patient not taking: Reported on 1/15/2024), Disp: 12 tablet, Rfl: 1  •  EPINEPHrine (EPIPEN) 0.3 mg/0.3 mL SOAJ, Inject 0.3 mL (0.3 mg total) into a muscle once for 1 dose, Disp: 0.6 mL, Rfl: 0  •  esomeprazole (NexIUM) 40 MG capsule, , Disp: , Rfl:   •  ferrous sulfate 325 (65 Fe) mg tablet, Take 1 tablet by mouth daily with breakfast, Disp: , Rfl:   •  gabapentin (NEURONTIN) 100 mg capsule, 200 mg qam and 300 mg qhs, Disp: 150 capsule, Rfl: 3  •  ketorolac (TORADOL) 10 mg tablet, Take 1 tablet (10 mg total) by mouth every 6 (six) hours as needed (migraine) Max 2-3 per week., Disp: 10 tablet, Rfl: 0  •  levothyroxine 50 mcg tablet, Take 50 mcg by mouth daily, Disp: , Rfl:   •  medroxyPROGESTERone (PROVERA) 10 mg tablet, Take 20 mg by mouth daily, Disp: , Rfl:   •  metoclopramide (REGLAN) 5 mg tablet, Take 5 mg by mouth if needed, Disp: , Rfl:   •  metoprolol succinate (TOPROL-XL) 25 mg 24 hr tablet, Take 0.5 tablets  by mouth daily, Disp: , Rfl:   •  metoprolol succinate (TOPROL-XL) 25 mg 24 hr tablet, TAKE 1 TABLET (25 MG TOTAL) BY MOUTH DAILY., Disp: , Rfl:   •  norethindrone (MICRONOR) 0.35 MG tablet, daily, Disp: , Rfl:   •  nortriptyline (PAMELOR) 10 mg capsule, Take 10 mg by mouth daily at bedtime, Disp: , Rfl:   •  OLANZapine (ZyPREXA) 5 mg tablet, Take 1 tablet (5 mg total) by mouth daily at bedtime for 5 days, Disp: 5 tablet, Rfl: 0  •  ondansetron (ZOFRAN) 4 mg tablet, 1 tab q8 hours prn nausea/ vomiting, Disp: 20 tablet, Rfl: 2  •  polyethylene glycol (MIRALAX) 17 g packet, Take 17 g by mouth as needed , Disp: , Rfl:   •  Prucalopride Succinate 2 MG TABS, Take 1 tablet by mouth daily (Patient not taking: Reported on 1/15/2024), Disp: , Rfl:   •  rimegepant sulfate (NURTEC) 75 mg TBDP, Take Nurtec every other day. If pt gets a migraine on the days not scheduled to take the pill, pt may take it then skip the next day., Disp: 16 tablet, Rfl: 11  •  sucralfate (CARAFATE) 1 g tablet, , Disp: , Rfl:     Current Allergies     Allergies as of 02/24/2024 - Reviewed 02/24/2024   Allergen Reaction Noted   • Amoxicillin Diarrhea 07/10/2023   • Cyclobenzaprine Tachycardia 06/30/2014   • Diazepam GI Intolerance, Abdominal Pain, and Other (See Comments) 06/30/2014   • Fentanyl Tachycardia 11/11/2019   • Fremanezumab Hives 02/27/2023   • Medical tape Other (See Comments) 03/19/2020   • Midazolam Other (See Comments) 01/07/2022   • Prednisone Other (See Comments) 08/31/2021   • Ginger - food allergy Rash 06/30/2020   • Melatonin Palpitations 01/12/2022            The following portions of the patient's history were reviewed and updated as appropriate: allergies, current medications, past family history, past medical history, past social history, past surgical history and problem list.     Past Medical History:   Diagnosis Date   • Hashimoto's disease 11/09/2022   • IBS (irritable bowel syndrome)    • Kidney stones        Past Surgical  History:   Procedure Laterality Date   • CHOLECYSTECTOMY     • HIP SURGERY  01/2022    [facility]-Dr. Villagomez       History reviewed. No pertinent family history.      Medications have been verified.        Objective   /83   Pulse 99   Temp 98.6 °F (37 °C)   Resp 18   SpO2 99%   No LMP recorded.       Physical Exam     Physical Exam  Vitals and nursing note reviewed.   Constitutional:       General: She is not in acute distress.     Appearance: Normal appearance. She is ill-appearing.   HENT:      Head: Normocephalic and atraumatic.      Right Ear: Tympanic membrane, ear canal and external ear normal.      Left Ear: Tympanic membrane, ear canal and external ear normal.      Nose: Congestion and rhinorrhea present.      Mouth/Throat:      Pharynx: Posterior oropharyngeal erythema present. No oropharyngeal exudate.   Eyes:      General:         Right eye: No discharge.         Left eye: No discharge.      Conjunctiva/sclera: Conjunctivae normal.   Cardiovascular:      Rate and Rhythm: Normal rate and regular rhythm.      Heart sounds: Normal heart sounds. No murmur heard.  Pulmonary:      Effort: Pulmonary effort is normal. No respiratory distress.      Breath sounds: Normal breath sounds. No stridor. No wheezing, rhonchi or rales.   Chest:      Chest wall: No tenderness.   Abdominal:      General: Bowel sounds are normal. There is no distension.      Palpations: Abdomen is soft.      Tenderness: There is no abdominal tenderness.   Musculoskeletal:      Cervical back: Normal range of motion.      Right lower leg: No edema.   Lymphadenopathy:      Cervical: No cervical adenopathy.   Skin:     General: Skin is warm and dry.   Neurological:      Mental Status: She is alert and oriented to person, place, and time.   Psychiatric:         Mood and Affect: Mood normal.         Behavior: Behavior normal.         Thought Content: Thought content normal.         Judgment: Judgment normal.

## 2024-02-24 NOTE — PATIENT INSTRUCTIONS
Upper Respiratory Infection   WHAT YOU NEED TO KNOW:   An upper respiratory infection is also called a cold. It can affect your nose, throat, ears, and sinuses. Cold symptoms are usually worst for the first 3 to 5 days. Most people get better in 7 to 14 days. You may continue to cough for 2 to 3 weeks. Colds are caused by viruses and do not get better with antibiotics.  DISCHARGE INSTRUCTIONS:   Call your local emergency number (911 in the [place]) if:   You have chest pain or trouble breathing.      Seek care immediately if:   You have a fever over 102ºF (39ºC).      Call your doctor if:   You have a low fever.    Your sore throat gets worse or you see white or yellow spots in your throat.    Your symptoms get worse after 3 to 5 days or are not better in 14 days.    You have a rash anywhere on your skin.    You have large, tender lumps in your neck.    You have thick, green, or yellow drainage from your nose.    You cough up thick yellow, green, or bloody mucus.    You have a bad earache.    You have questions or concerns about your condition or care.    Medicines:  You may need any of the following:  Decongestants  help reduce nasal congestion and help you breathe more easily. If you take decongestant pills, they may make you feel restless or cause problems with your sleep. Do not use decongestant sprays for more than a few days.    Cough suppressants  help reduce coughing. Ask your healthcare provider which type of cough medicine is best for you.     NSAIDs , such as ibuprofen, help decrease swelling, pain, and fever. NSAIDs can cause stomach bleeding or kidney problems in certain people. If you take blood thinner medicine, always ask your healthcare provider if NSAIDs are safe for you. Always read the medicine label and follow directions.    Acetaminophen  decreases pain and fever. It is available without a doctor's order. Ask how much to take and how often to take it. Follow directions. Read the labels of all other  medicines you are using to see if they also contain acetaminophen, or ask your doctor or pharmacist. Acetaminophen can cause liver damage if not taken correctly.    Take your medicine as directed.  Contact your healthcare provider if you think your medicine is not helping or if you have side effects. Tell your provider if you are allergic to any medicine. Keep a list of the medicines, vitamins, and herbs you take. Include the amounts, and when and why you take them. Bring the list or the pill bottles to follow-up visits. Carry your medicine list with you in case of an emergency.    Self-care:   Rest as much as possible.  Slowly start to do more each day.    Drink more liquids as directed.  Liquids will help thin and loosen mucus so you can cough it up. Liquids will also help prevent dehydration. Liquids that help prevent dehydration include water, fruit juice, and broth. Do not drink liquids that contain caffeine. Caffeine can increase your risk for dehydration. Ask your healthcare provider how much liquid to drink each day.    Soothe a sore throat.  Gargle with warm salt water. Make salt water by dissolving ¼ teaspoon salt in 1 cup warm water. You may also suck on hard candy or throat lozenges. You may use a sore throat spray.    Use a humidifier or vaporizer.  Use a cool mist humidifier or a vaporizer to increase air moisture in your home. This may make it easier for you to breathe and help decrease your cough.    Use saline nasal drops as directed.  These help relieve congestion.    Apply petroleum-based jelly around the outside of your nostrils.  This can decrease irritation from blowing your nose.    Do not smoke.  Nicotine and other chemicals in cigarettes and cigars can make your symptoms worse. They can also cause infections such as bronchitis or pneumonia. Ask your healthcare provider for information if you currently smoke and need help to quit. E-cigarettes or smokeless tobacco still contain nicotine. Talk  to your healthcare provider before you use these products.    Prevent a cold:   Wash your hands often.  Use soap and water every time you wash your hands. Rub your soapy hands together, lacing your fingers. Use the fingers of one hand to scrub under the nails of the other hand. Wash for at least 20 seconds. Rinse with warm, running water for several seconds. Then dry your hands. Use hand  gel if soap and water are not available. Do not touch your eyes or mouth without washing your hands first.         Cover a sneeze or cough.  Use a tissue that covers your mouth and nose. Put the used tissue in the trash right away. Use the bend of your arm if a tissue is not available. Wash your hands well with soap and water or use a hand . Do not stand close to anyone who is sneezing or coughing.    Try to stay away from others while you are sick.  This is especially important during the first 2 to 3 days when the virus is more easily spread. Wait until a fever, cough, or other symptoms are gone before you return to work or other regular activities.    Do not share items while you are sick.  This includes food, drinks, eating utensils, and dishes.    Follow up with your doctor as directed:  Write down your questions so you remember to ask them during your visits.  © Copyright [employer] 2023 Information is for End User's use only and may not be sold, redistributed or otherwise used for commercial purposes.  The above information is an  only. It is not intended as medical advice for individual conditions or treatments. Talk to your doctor, nurse or pharmacist before following any medical regimen to see if it is safe and effective for you.

## 2024-03-01 ENCOUNTER — APPOINTMENT (OUTPATIENT)
Dept: URGENT CARE | Facility: CLINIC | Age: 29
End: 2024-03-01
Payer: OTHER MISCELLANEOUS

## 2024-03-01 PROCEDURE — 99213 OFFICE O/P EST LOW 20 MIN: CPT

## 2024-03-18 ENCOUNTER — TELEPHONE (OUTPATIENT)
Dept: OBGYN CLINIC | Facility: CLINIC | Age: 29
End: 2024-03-18

## 2024-03-18 NOTE — TELEPHONE ENCOUNTER
Left voice mail message asking if tomorrow's appointment is a 2nd opinion?  If it is she needs to schedule with Dr. Logan.

## 2024-05-01 ENCOUNTER — TELEPHONE (OUTPATIENT)
Dept: NEUROLOGY | Facility: CLINIC | Age: 29
End: 2024-05-01

## 2024-05-01 NOTE — TELEPHONE ENCOUNTER
Left voicemail that we need to R/S her upcoming 730am appt on 5/15 with MK, she is no longer doing 730 appt times, I have an opening at 9am on 5/2 if she is able to do that day or on 5/3. Send me a TEAMS msg if she calls back!

## 2024-05-04 ENCOUNTER — OFFICE VISIT (OUTPATIENT)
Dept: URGENT CARE | Facility: CLINIC | Age: 29
End: 2024-05-04
Payer: COMMERCIAL

## 2024-05-04 VITALS
DIASTOLIC BLOOD PRESSURE: 64 MMHG | WEIGHT: 148 LBS | OXYGEN SATURATION: 99 % | TEMPERATURE: 98.9 F | SYSTOLIC BLOOD PRESSURE: 100 MMHG | HEART RATE: 85 BPM | BODY MASS INDEX: 29.84 KG/M2 | HEIGHT: 59 IN | RESPIRATION RATE: 16 BRPM

## 2024-05-04 DIAGNOSIS — J02.9 SORE THROAT: Primary | ICD-10-CM

## 2024-05-04 DIAGNOSIS — B34.9 VIRAL SYNDROME: ICD-10-CM

## 2024-05-04 PROCEDURE — S9083 URGENT CARE CENTER GLOBAL: HCPCS | Performed by: NURSE PRACTITIONER

## 2024-05-04 PROCEDURE — G0382 LEV 3 HOSP TYPE B ED VISIT: HCPCS | Performed by: NURSE PRACTITIONER

## 2024-05-04 RX ORDER — PREDNISONE 20 MG/1
20 TABLET ORAL 2 TIMES DAILY WITH MEALS
Qty: 10 TABLET | Refills: 0 | Status: SHIPPED | OUTPATIENT
Start: 2024-05-04 | End: 2024-05-09

## 2024-05-04 NOTE — PROGRESS NOTES
[facility]        NAME: Renetta Meza is a 28 y.o. female  : 1995    MRN: [id number]  DATE: May 4, 2024  TIME: 8:27 AM    Assessment and Plan   Sore throat [J02.9]  1. Sore throat  predniSONE 20 mg tablet      2. Viral syndrome          Recommend aleve cold and sinus   Will send prednisone to pharmacy for symptom management  Ok to continue tylenol   F/u with pcp    Patient Instructions     Follow up with PCP in 3-5 days.  Proceed to  ER if symptoms worsen.    Chief Complaint     Chief Complaint   Patient presents with    Sore Throat     Pt presets with sore throat, nasal congestion, sinus pressure, and bilateral ear pain and pressure x 2 days.  No fevers.  Pt has been taking Tylenol for pain.           History of Present Illness   Renetta Meza presents to the clinic c/o    Sore Throat (Pt presets with sore throat, nasal congestion, sinus pressure, and bilateral ear pain and pressure x 2 days.  No fevers.  Pt has been taking Tylenol for pain.  )  Tylenol no longer helping for the sore throat   No known sick contacts  Had a sinus infection in feb and is afraid she will get another one.       Sore Throat         Review of Systems   Review of Systems   HENT:  Positive for sore throat.    All other systems reviewed and are negative.        Current Medications     Long-Term Medications   Medication Sig Dispense Refill    [employer] Senna 8.6 MG tablet Take 1 tablet by mouth if needed      EPINEPHrine (EPIPEN) 0.3 mg/0.3 mL SOAJ Inject 0.3 mL (0.3 mg total) into a muscle once for 1 dose 0.6 mL 0    ferrous sulfate 325 (65 Fe) mg tablet Take 1 tablet by mouth daily with breakfast      gabapentin (NEURONTIN) 100 mg capsule 200 mg qam and 300 mg qhs 150 capsule 3    ketorolac (TORADOL) 10 mg tablet Take 1 tablet (10 mg total) by mouth every 6 (six) hours as needed (migraine) Max 2-3 per week. 10 tablet 0    levothyroxine 50 mcg tablet Take 50 mcg by mouth daily      medroxyPROGESTERone (PROVERA) 10 mg tablet Take  20 mg by mouth daily      metoprolol succinate (TOPROL-XL) 25 mg 24 hr tablet Take 0.5 tablets by mouth daily      metoprolol succinate (TOPROL-XL) 25 mg 24 hr tablet TAKE 1 TABLET (25 MG TOTAL) BY MOUTH DAILY.      norethindrone (MICRONOR) 0.35 MG tablet daily      OLANZapine (ZyPREXA) 5 mg tablet Take 1 tablet (5 mg total) by mouth daily at bedtime for 5 days 5 tablet 0    ondansetron (ZOFRAN) 4 mg tablet 1 tab q8 hours prn nausea/ vomiting 20 tablet 2    polyethylene glycol (MIRALAX) 17 g packet Take 17 g by mouth as needed       Cholecalciferol (Vitamin D3) 1.25 MG (19254 UT) CAPS TAKE 1,250 MCG BY MOUTH EVERY 7 DAYS FOR 8 DOSES. 8 WEEKS. (Patient not taking: Reported on 1/15/2024)      Cholecalciferol 1.25 MG (08893 UT) capsule TAKE 1,250 MCG BY MOUTH EVERY 7 DAYS FOR 8 DOSES. 8 WEEKS. (Patient not taking: Reported on 1/15/2024)      Cholecalciferol 50 MCG (2000 UT) CAPS Take 2,000 Units by mouth daily (Patient not taking: Reported on 1/15/2024)      divalproex sodium (DEPAKOTE) 250 mg EC tablet 2 tabs q12 h x 2 days, then 1 tab q12 h x 2 days, then stop. Repeat if needed. (Patient not taking: Reported on 1/15/2024) 12 tablet 1    nortriptyline (PAMELOR) 10 mg capsule Take 10 mg by mouth daily at bedtime (Patient not taking: Reported on 5/4/2024)         Current Allergies     Allergies as of 05/04/2024 - Reviewed 05/04/2024   Allergen Reaction Noted    Amoxicillin Diarrhea 07/10/2023    Cyclobenzaprine Tachycardia 06/30/2014    Diazepam GI Intolerance, Abdominal Pain, and Other (See Comments) 06/30/2014    Fentanyl Tachycardia 11/11/2019    Fremanezumab Hives 02/27/2023    Medical tape Other (See Comments) 03/19/2020    Midazolam Other (See Comments) 01/07/2022    Prednisone Other (See Comments) 08/31/2021    Ginger - food allergy Rash 06/30/2020    Melatonin Palpitations 01/12/2022            The following portions of the patient's history were reviewed and updated as appropriate: allergies, current  "medications, past family history, past medical history, past social history, past surgical history and problem list.    Objective   /64   Pulse 85   Temp 98.9 °F (37.2 °C)   Resp 16   Ht 4' 11\" (1.499 m)   Wt 67.1 kg (148 lb)   SpO2 99%   BMI 29.89 kg/m²        Physical Exam     Physical Exam  Vitals and nursing note reviewed.   Constitutional:       Appearance: Normal appearance. She is well-developed.   HENT:      Head: Normocephalic and atraumatic.      Right Ear: Hearing, tympanic membrane, ear canal and external ear normal.      Left Ear: Hearing, tympanic membrane, ear canal and external ear normal.      Nose: Mucosal edema and congestion present.      Right Sinus: No maxillary sinus tenderness or frontal sinus tenderness.      Left Sinus: No maxillary sinus tenderness or frontal sinus tenderness.      Mouth/Throat:      Lips: Pink.      Mouth: Mucous membranes are moist.      Pharynx: Oropharynx is clear.      Comments: PND  Eyes:      General: Lids are normal.      Conjunctiva/sclera: Conjunctivae normal.      Pupils: Pupils are equal, round, and reactive to light.   Cardiovascular:      Rate and Rhythm: Normal rate and regular rhythm.      Heart sounds: Normal heart sounds, S1 normal and S2 normal.   Pulmonary:      Effort: Pulmonary effort is normal.      Breath sounds: Normal breath sounds.   Abdominal:      General: Abdomen is flat. Bowel sounds are normal.      Palpations: Abdomen is soft.   Musculoskeletal:         General: Normal range of motion.      Cervical back: Full passive range of motion without pain, normal range of motion and neck supple.   Skin:     General: Skin is warm and dry.   Neurological:      General: No focal deficit present.      Mental Status: She is alert and oriented to person, place, and time.   Psychiatric:         Mood and Affect: Mood normal.         Speech: Speech normal.         Behavior: Behavior normal. Behavior is cooperative.         Thought Content: " Thought content normal.         Judgment: Judgment normal.

## 2024-05-04 NOTE — PATIENT INSTRUCTIONS
Aleve cold and Sinus     Viral Syndrome   AMBULATORY CARE:   Viral syndrome  is a term used for symptoms of an infection caused by a virus. Viruses are spread easily from person to person on shared items.  Signs and symptoms  may start slowly or suddenly and last hours to days. They can be mild to severe and can change over days or hours. You may have any of the following:  Fever and chills    A runny or stuffy nose    Cough, sore throat, or hoarseness    Headache, or pain and pressure around your eyes    Muscle aches and joint pain    Shortness of breath or wheezing    Abdominal pain, cramps, and diarrhea    Nausea, vomiting, or loss of appetite    Call your local emergency number (911 in the [place]), or have someone call if:   You have a seizure.    You cannot be woken.    You have chest pain or trouble breathing.    Seek care immediately if:   You have a stiff neck, a bad headache, and sensitivity to light.    You feel weak, dizzy, or confused.    You stop urinating or urinate a lot less than usual.    You cough up blood or thick yellow or green mucus.    You have severe abdominal pain or your abdomen is larger than usual.    Call your doctor if:   Your symptoms do not get better with treatment or get worse after 3 days.    You have a rash or ear pain.    You have burning when you urinate.    You have questions or concerns about your condition or care.    Treatment for viral syndrome  may include medicines to manage your symptoms. Antibiotics are not given for a viral infection. You may  need any of the following:  Acetaminophen  decreases pain and fever. It is available without a doctor's order. Ask how much to take and how often to take it. Follow directions. Read the labels of all other medicines you are using to see if they also contain acetaminophen, or ask your doctor or pharmacist. Acetaminophen can cause liver damage if not taken correctly.    NSAIDs , such as ibuprofen, help decrease swelling, pain, and  fever. NSAIDs can cause stomach bleeding or kidney problems in certain people. If you take blood thinner medicine, always ask your healthcare provider if NSAIDs are safe for you. Always read the medicine label and follow directions.    Cold medicine  helps decrease swelling, control a cough, and relieve chest or nasal congestion.    Saline nasal spray  helps decrease nasal congestion.    Manage your symptoms:   Drink liquids as directed to prevent dehydration.  Ask how much liquid to drink each day and which liquids are best for you. Do not drink liquids with caffeine. Caffeine can make dehydration worse.     Get plenty of rest to help your body heal.  Take naps throughout the day. Ask your healthcare provider when you can return to work and your normal activities.    Use a cool mist humidifier  to increase air moisture in your home. This may make it easier for you to breathe and help decrease your cough.     Drink tea with honey or use cough drops for a sore throat.  Cough drops are available without a doctor's order. Follow directions for taking cough drops.    Do not smoke or be close to anyone who is smoking.  Nicotine and other chemicals in cigarettes and cigars can cause lung damage. Smoking can also delay healing. Ask your healthcare provider for information if you currently smoke and need help to quit. E-cigarettes or smokeless tobacco still contain nicotine. Talk to your healthcare provider before you use these products.    Prevent the spread of germs:   Wash your hands often throughout the day.  Use soap and water. Rub your soapy hands together, lacing your fingers, for at least 20 seconds. Rinse with warm, running water. Dry your hands with a clean towel or paper towel. Use hand  that contains alcohol if soap and water are not available. Teach children how to wash their hands and use hand .         Cover sneezes and coughs.  Turn your face away and cover your mouth and nose with a  tissue. Throw the tissue away. Use the bend of your arm if a tissue is not available. Then wash your hands well with soap and water or use hand . Teach children how to cover a cough or sneeze.    Stay home while you are sick.  Avoid crowds as much as possible.    Get the influenza (flu) vaccine as soon as recommended each year.  The flu vaccine is available starting in September or October. Ask your healthcare provider about the pneumonia vaccine. This vaccine is usually recommended every 5 years in older adults.       Follow up with your doctor as directed:  Write down your questions so you remember to ask them during your visits.  © Copyright [employer] 2023 Information is for End User's use only and may not be sold, redistributed or otherwise used for commercial purposes.  The above information is an  only. It is not intended as medical advice for individual conditions or treatments. Talk to your doctor, nurse or pharmacist before following any medical regimen to see if it is safe and effective for you.

## 2024-05-19 ENCOUNTER — OFFICE VISIT (OUTPATIENT)
Dept: URGENT CARE | Facility: MEDICAL CENTER | Age: 29
End: 2024-05-19
Payer: COMMERCIAL

## 2024-05-19 VITALS
SYSTOLIC BLOOD PRESSURE: 121 MMHG | RESPIRATION RATE: 18 BRPM | TEMPERATURE: 97.8 F | DIASTOLIC BLOOD PRESSURE: 69 MMHG | OXYGEN SATURATION: 100 % | HEART RATE: 64 BPM

## 2024-05-19 DIAGNOSIS — J02.9 SORE THROAT: Primary | ICD-10-CM

## 2024-05-19 LAB — S PYO AG THROAT QL: NEGATIVE

## 2024-05-19 PROCEDURE — 87070 CULTURE OTHR SPECIMN AEROBIC: CPT | Performed by: FAMILY MEDICINE

## 2024-05-19 PROCEDURE — 87147 CULTURE TYPE IMMUNOLOGIC: CPT | Performed by: FAMILY MEDICINE

## 2024-05-19 PROCEDURE — G0382 LEV 3 HOSP TYPE B ED VISIT: HCPCS | Performed by: FAMILY MEDICINE

## 2024-05-19 PROCEDURE — S9083 URGENT CARE CENTER GLOBAL: HCPCS | Performed by: FAMILY MEDICINE

## 2024-05-19 RX ORDER — FLUDROCORTISONE ACETATE 0.1 MG/1
0.1 TABLET ORAL DAILY
COMMUNITY
Start: 2024-03-06 | End: 2025-03-06

## 2024-05-19 RX ORDER — MIDODRINE HYDROCHLORIDE 2.5 MG/1
2.5 TABLET ORAL 2 TIMES DAILY
COMMUNITY
Start: 2024-05-14 | End: 2024-06-13

## 2024-05-19 RX ORDER — FAMOTIDINE 40 MG/1
TABLET, FILM COATED ORAL
COMMUNITY
Start: 2024-05-03

## 2024-05-19 RX ORDER — LEVOTHYROXINE SODIUM 50 UG/1
CAPSULE ORAL
COMMUNITY
Start: 2024-03-03

## 2024-05-19 RX ORDER — PREDNISONE 20 MG/1
TABLET ORAL
Qty: 18 TABLET | Refills: 0 | Status: SHIPPED | OUTPATIENT
Start: 2024-05-19

## 2024-05-19 RX ORDER — KETOCONAZOLE 20 MG/ML
SHAMPOO TOPICAL
COMMUNITY
Start: 2024-04-23

## 2024-05-19 NOTE — PROGRESS NOTES
[facility]        NAME: Renetta Meza is a 28 y.o. female  : 1995    MRN: [id number]  DATE: May 19, 2024  TIME: 9:32 AM    Assessment and Plan   Sore throat [J02.9]  1. Sore throat  POCT rapid ANTIGEN strepA    Throat culture    Throat culture    predniSONE 20 mg tablet            Patient Instructions       Follow up with PCP in 3-5 days.  Proceed to  ER if symptoms worsen.    If tests have been performed at [facility], our office will contact you with results if changes need to be made to the care plan discussed with you at the visit.  You can review your full results on [facility].    Chief Complaint     Chief Complaint   Patient presents with    Sore Throat     Patient c/o nasal congestion and sore throat with swollen tonsil x 3 weeks          History of Present Illness       28-year-old female who with sore throat for the past 3 weeks.  She also has some nasal congestion but generally well-controlled with combination of Flonase and generic loratadine she takes on a daily swollen tonsil and placed on the 6-day course of prednisone which seem to help while taking it.  However symptoms returned 3 days later.  She has an appointment to see ear nose and throat specialist but not for another 2 months.  She also informs me that she has had none seasonal allergies.  Otherwise denies fever, body aches, headache.  She was seen at another urgent care at that time treated    Sore Throat   Associated symptoms include congestion and coughing.       Review of Systems   Review of Systems   Constitutional: Negative.    HENT:  Positive for congestion and sore throat.    Respiratory:  Positive for cough.          Current Medications       Current Outpatient Medications:     Cymbalta 30 MG delayed release capsule, Take 30 mg by mouth daily, Disp: , Rfl:     econazole nitrate 1 % cream, APPLY TWICE DAILY TO RASH FOR 2-4 WEEKS OR UNTIL CLEAR, Disp: , Rfl:     famotidine (PEPCID) 40 MG tablet, TAKE 1 TABLET (40  MG TOTAL) BY MOUTH TWICE A DAY AS NEEDED FOR HEARTBURN, Disp: , Rfl:     fludrocortisone (FLORINEF) 0.1 mg tablet, Take 0.1 mg by mouth daily, Disp: , Rfl:     ketoconazole (NIZORAL) 2 % shampoo, PLEASE SEE ATTACHED FOR DETAILED DIRECTIONS, Disp: , Rfl:     midodrine (PROAMATINE) 2.5 mg tablet, Take 2.5 mg by mouth 2 (two) times a day, Disp: , Rfl:     predniSONE 20 mg tablet, 3 tablets once daily day 1 through 3 then 2 tablets daily day 4 through 6 then 1 tablet daily day 7 through 9., Disp: 18 tablet, Rfl: 0    Tirosint 50 MCG CAPS, TAKE 50 MCG BY MOUTH DAILY., Disp: , Rfl:     benzonatate (TESSALON) 200 MG capsule, Take 1 capsule (200 mg total) by mouth 3 (three) times a day as needed for cough (Patient not taking: Reported on 5/4/2024), Disp: 20 capsule, Rfl: 0    Cholecalciferol (Vitamin D3) 1.25 MG (39059 UT) CAPS, TAKE 1,250 MCG BY MOUTH EVERY 7 DAYS FOR 8 DOSES. 8 WEEKS. (Patient not taking: Reported on 1/15/2024), Disp: , Rfl:     Cholecalciferol 1.25 MG (69167 UT) capsule, TAKE 1,250 MCG BY MOUTH EVERY 7 DAYS FOR 8 DOSES. 8 WEEKS. (Patient not taking: Reported on 1/15/2024), Disp: , Rfl:     Cholecalciferol 50 MCG (2000 UT) CAPS, Take 2,000 Units by mouth daily (Patient not taking: Reported on 1/15/2024), Disp: , Rfl:     [facility] Senna 8.6 MG tablet, Take 1 tablet by mouth if needed, Disp: , Rfl:     dexamethasone (DECADRON) 1 mg tablet, 1 tab qam with food 1 day prior to botox, day of botox and 1 day after botox (Patient not taking: Reported on 1/15/2024), Disp: 10 tablet, Rfl: 2    divalproex sodium (DEPAKOTE) 250 mg EC tablet, 2 tabs q12 h x 2 days, then 1 tab q12 h x 2 days, then stop. Repeat if needed. (Patient not taking: Reported on 1/15/2024), Disp: 12 tablet, Rfl: 1    EPINEPHrine (EPIPEN) 0.3 mg/0.3 mL SOAJ, Inject 0.3 mL (0.3 mg total) into a muscle once for 1 dose, Disp: 0.6 mL, Rfl: 0    esomeprazole (NexIUM) 40 MG capsule, , Disp: , Rfl:     ferrous sulfate 325 (65 Fe) mg tablet, Take 1 tablet by  mouth daily with breakfast, Disp: , Rfl:     gabapentin (NEURONTIN) 100 mg capsule, 200 mg qam and 300 mg qhs, Disp: 150 capsule, Rfl: 3    ketorolac (TORADOL) 10 mg tablet, Take 1 tablet (10 mg total) by mouth every 6 (six) hours as needed (migraine) Max 2-3 per week., Disp: 10 tablet, Rfl: 0    levothyroxine 50 mcg tablet, Take 50 mcg by mouth daily, Disp: , Rfl:     medroxyPROGESTERone (PROVERA) 10 mg tablet, Take 20 mg by mouth daily, Disp: , Rfl:     metoclopramide (REGLAN) 5 mg tablet, Take 5 mg by mouth if needed, Disp: , Rfl:     metoprolol succinate (TOPROL-XL) 25 mg 24 hr tablet, Take 0.5 tablets by mouth daily, Disp: , Rfl:     metoprolol succinate (TOPROL-XL) 25 mg 24 hr tablet, TAKE 1 TABLET (25 MG TOTAL) BY MOUTH DAILY., Disp: , Rfl:     norethindrone (MICRONOR) 0.35 MG tablet, daily, Disp: , Rfl:     nortriptyline (PAMELOR) 10 mg capsule, Take 10 mg by mouth daily at bedtime (Patient not taking: Reported on 5/4/2024), Disp: , Rfl:     OLANZapine (ZyPREXA) 5 mg tablet, Take 1 tablet (5 mg total) by mouth daily at bedtime for 5 days, Disp: 5 tablet, Rfl: 0    ondansetron (ZOFRAN) 4 mg tablet, 1 tab q8 hours prn nausea/ vomiting, Disp: 20 tablet, Rfl: 2    polyethylene glycol (MIRALAX) 17 g packet, Take 17 g by mouth as needed , Disp: , Rfl:     Prucalopride Succinate 2 MG TABS, Take 1 tablet by mouth daily (Patient not taking: Reported on 1/15/2024), Disp: , Rfl:     rimegepant sulfate (NURTEC) 75 mg TBDP, Take Nurtec every other day. If pt gets a migraine on the days not scheduled to take the pill, pt may take it then skip the next day., Disp: 16 tablet, Rfl: 11    sucralfate (CARAFATE) 1 g tablet, , Disp: , Rfl:     Current Allergies     Allergies as of 05/19/2024 - Reviewed 05/19/2024   Allergen Reaction Noted    Amoxicillin Diarrhea 07/10/2023    Cyclobenzaprine Tachycardia 06/30/2014    Diazepam GI Intolerance, Abdominal Pain, and Other (See Comments) 06/30/2014    Fentanyl Tachycardia 11/11/2019     Fremanezumab Hives 02/27/2023    Medical tape Other (See Comments) 03/19/2020    Midazolam Other (See Comments) 01/07/2022    Prednisone Other (See Comments) 08/31/2021    Laurie - food allergy Rash 06/30/2020    Melatonin Palpitations 01/12/2022            The following portions of the patient's history were reviewed and updated as appropriate: allergies, current medications, past family history, past medical history, past social history, past surgical history and problem list.     Past Medical History:   Diagnosis Date    Hashimoto's disease 11/09/2022    IBS (irritable bowel syndrome)     Kidney stones        Past Surgical History:   Procedure Laterality Date    CHOLECYSTECTOMY      HIP SURGERY  01/2022    [facility]-Dr. Villagomez       No family history on file.      Medications have been verified.        Objective   /69   Pulse 64   Temp 97.8 °F (36.6 °C)   Resp 18   SpO2 100%   No LMP recorded.       Physical Exam     Physical Exam  Vitals and nursing note reviewed.   Constitutional:       Appearance: She is well-developed.   HENT:      Nose:      Comments: Erythematous, slightly hypertrophic turbinates bilaterally.     Mouth/Throat:      Tonsils: 1+ on the right. 1+ on the left.      Comments: Posterior pharynx reveals cobblestoning.  No exudates visualized.  Musculoskeletal:      Cervical back: Normal range of motion and neck supple.   Neurological:      Mental Status: She is alert.

## 2024-05-19 NOTE — PATIENT INSTRUCTIONS
Rapid strep test negative.  Throat culture performed.  I started the patient on prednisone tapering from 60 down to 20 mg over 9 days.  She is to continue Flonase and loratadine for an now for seasonal allergies.  Pharyngitis   WHAT YOU NEED TO KNOW:   Pharyngitis, or sore throat, is inflammation of the tissues and structures in your pharynx (throat). Pharyngitis is most often caused by bacteria or a virus. Other causes include smoking, allergies, or acid reflux.  DISCHARGE INSTRUCTIONS:   Call your local emergency number (911 in the [place]) if:   You have trouble breathing or swallowing because your throat is swollen.      Return to the emergency department if:   You are drooling because it hurts too much to swallow.    Your fever is higher than 102?F (39?C) or lasts longer than 3 days.    You are confused.    You taste blood.    Call your doctor if:   Your throat pain gets worse.    You have a painful lump in your throat that does not go away after 5 days.    Your symptoms do not improve after 5 days.    You have questions or concerns about your condition or care.    Medicines:  Viral pharyngitis will go away on its own without treatment. Your sore throat should start to feel better in 3 to 5 days. You may need any of the following:  Antibiotics  treat a bacterial infection.    NSAIDs  help decrease swelling and pain or fever. This medicine is available with or without a doctor's order. NSAIDs can cause stomach bleeding or kidney problems in certain people. If you take blood thinner medicine, always ask your healthcare provider if NSAIDs are safe for you. Always read the medicine label and follow directions.    Acetaminophen  decreases pain and fever. It is available without a doctor's order. Ask how much to take and how often to take it. Follow directions. Read the labels of all other medicines you are using to see if they also contain acetaminophen, or ask your doctor or pharmacist. Acetaminophen can cause liver  damage if not taken correctly.    Take your medicine as directed.  Contact your healthcare provider if you think your medicine is not helping or if you have side effects. Tell your provider if you are allergic to any medicine. Keep a list of the medicines, vitamins, and herbs you take. Include the amounts, and when and why you take them. Bring the list or the pill bottles to follow-up visits. Carry your medicine list with you in case of an emergency.    Manage your symptoms:   Gargle salt water.  Mix ¼ teaspoon salt in an 8 ounce glass of warm water and gargle. Do not swallow. Salt water may help decrease swelling in your throat.    Drink liquids as directed.  You may need to drink more liquids than usual. Liquids may help soothe your throat and prevent dehydration. Ask how much liquid to drink each day and which liquids are best for you.    Use a cool mist humidifier.  This will add moisture to the air and help decrease your cough.    Soothe your throat.  Cough drops, ice, soft foods, or popsicles may help decrease throat pain.    Prevent the spread of pharyngitis:  Cover your mouth and nose when you cough or sneeze. Do not share food or drinks. Wash your hands often. Use soap and water. If soap and water are unavailable, use an alcohol-based hand .       Follow up with your doctor as directed:  Write down your questions so you remember to ask them during your visits.  © Copyright [employer] 2023 Information is for End User's use only and may not be sold, redistributed or otherwise used for commercial purposes.  The above information is an  only. It is not intended as medical advice for individual conditions or treatments. Talk to your doctor, nurse or pharmacist before following any medical regimen to see if it is safe and effective for you.

## 2024-05-22 ENCOUNTER — TELEPHONE (OUTPATIENT)
Dept: URGENT CARE | Facility: MEDICAL CENTER | Age: 29
End: 2024-05-22

## 2024-05-22 DIAGNOSIS — J02.0 STREP PHARYNGITIS: Primary | ICD-10-CM

## 2024-05-22 LAB — BACTERIA THROAT CULT: ABNORMAL

## 2024-05-22 RX ORDER — CEPHALEXIN 500 MG/1
500 CAPSULE ORAL EVERY 12 HOURS SCHEDULED
Qty: 20 CAPSULE | Refills: 0 | Status: SHIPPED | OUTPATIENT
Start: 2024-05-22 | End: 2024-06-01

## 2024-05-22 NOTE — TELEPHONE ENCOUNTER
Called patient to discuss positive throat culture results. She reports she is still feeling sick with a sore throat, so at this time we will treat with a course of antibiotics. This was sent to the patient's pharmacy of choice.

## 2024-06-10 ENCOUNTER — TELEMEDICINE (OUTPATIENT)
Dept: NEUROLOGY | Facility: CLINIC | Age: 29
End: 2024-06-10
Payer: COMMERCIAL

## 2024-06-10 DIAGNOSIS — G43.709 CHRONIC MIGRAINE WITHOUT AURA WITHOUT STATUS MIGRAINOSUS, NOT INTRACTABLE: ICD-10-CM

## 2024-06-10 DIAGNOSIS — G43.109 MIGRAINE WITH AURA AND WITHOUT STATUS MIGRAINOSUS, NOT INTRACTABLE: Primary | ICD-10-CM

## 2024-06-10 PROCEDURE — 99213 OFFICE O/P EST LOW 20 MIN: CPT | Performed by: PHYSICIAN ASSISTANT

## 2024-06-10 NOTE — PROGRESS NOTES
Virtual Regular Visit    Verification of patient location:    Patient is located at Other in the following state in which I hold an active license [place]      Assessment/Plan:    Problem List Items Addressed This Visit          Cardiovascular and Mediastinum    Migraine with aura and without status migrainosus, not intractable - Primary    Relevant Medications    rimegepant sulfate (NURTEC) 75 mg TBDP    Chronic migraine without aura without status migrainosus, not intractable    Relevant Medications    rimegepant sulfate (NURTEC) 75 mg TBDP     Significant reduction of migraine headaches greater than 50% since starting Vyepti infusion 100 mg every 84 days approximately.  No side effects except mild congestion which is alleviated with antihistamines.  All noted below.  Continue this regimen, with Nurtec as needed.  She needs to have a steroid and Benadryl prior to the infusion to prevent allergy.    The patient should not hesitate to call me prior to her follow up with any questions or concerns.         Reason for visit is   Chief Complaint   Patient presents with    Virtual Regular Visit          Encounter provider Tere Glez PA-C      Recent Visits  No visits were found meeting these conditions.  Showing recent visits within past 7 days and meeting all other requirements  Today's Visits  Date Type Provider Dept   06/10/24 Telemedicine Tere Glez PA-C [facility]   Showing today's visits and meeting all other requirements  Future Appointments  No visits were found meeting these conditions.  Showing future appointments within next 150 days and meeting all other requirements       The patient was identified by name and date of birth. Renetta Meza was informed that this is a telemedicine visit and that the visit is being conducted through the Epic Embedded platform. She agrees to proceed..  My office door was closed. No one else was in the room.  She acknowledged consent and understanding of  "privacy and security of the video platform. The patient has agreed to participate and understands they can discontinue the visit at any time.    Patient is aware this is a billable service.     Subjective  Renetta Meza is a 28 y.o. female who is contacted via telemedicine for neurological follow-up.      HPI    Tingling and numbness in hand kind of went away. She said she gets about 1-2 migraines a month closer to when her infusion is due.  She is happy with the reduction of migraine headaches since starting Vyepti infusion.  She gets the medication delivered to her home and takes it to [facility] in [place].  She has steroid and Benadryl administered prior to the Vyepti to prevent side effects.  She denies any allergic type symptoms with the Vyepti after given steroid and Benadryl, no swelling or any shortness of breath, etc.  She does get a little \"stuffy\" after the Vyepti but it only lasts about 24 to 48 hours and goes away quickly.  She takes extra Flonase or antihistamine for this which helps.  She is very happy with the infusion as it has reduced greater than 50% of her migraine headaches.  She gets virtually no migraines except when the Vyepti tends to wear off at the end of the infusion cycle.  She does like to take Nurtec at the onset of a migraine but has not been able to get a refill of this for some reason.  She thinks it was approved through a mail order before.  She did not have side effects to Nurtec.  The numbness and tingling in the left greater than right hand and arm has significantly improved since starting Vyepti so she is wondering if that was of symptoms secondary to her migraines.    She is now following with rheumatology regarding an intermittent rash that she gets on her face, they are thinking possible lupus.  She has to go for a skin biopsy with dermatology to confirm.  She does also have more joint pain and muscle pains lately.        Past Medical History: "   Diagnosis Date    Hashimoto's disease 11/09/2022    IBS (irritable bowel syndrome)     Kidney stones        Past Surgical History:   Procedure Laterality Date    CHOLECYSTECTOMY      HIP SURGERY  01/2022    [facility]-Dr. Villagomez       Current Outpatient Medications   Medication Sig Dispense Refill    [facility] Senna 8.6 MG tablet Take 1 tablet by mouth if needed      Cymbalta 30 MG delayed release capsule Take 30 mg by mouth daily      econazole nitrate 1 % cream APPLY TWICE DAILY TO RASH FOR 2-4 WEEKS OR UNTIL CLEAR      EPINEPHrine (EPIPEN) 0.3 mg/0.3 mL SOAJ Inject 0.3 mL (0.3 mg total) into a muscle once for 1 dose 0.6 mL 0    esomeprazole (NexIUM) 40 MG capsule       ferrous sulfate 325 (65 Fe) mg tablet Take 1 tablet by mouth daily with breakfast      fludrocortisone (FLORINEF) 0.1 mg tablet Take 0.1 mg by mouth daily      gabapentin (NEURONTIN) 100 mg capsule 200 mg qam and 300 mg qhs 150 capsule 3    ketoconazole (NIZORAL) 2 % shampoo PLEASE SEE ATTACHED FOR DETAILED DIRECTIONS      levothyroxine 50 mcg tablet Take 50 mcg by mouth daily      medroxyPROGESTERone (PROVERA) 10 mg tablet Take 20 mg by mouth daily      metoprolol succinate (TOPROL-XL) 25 mg 24 hr tablet Take 0.5 tablets by mouth daily      metoprolol succinate (TOPROL-XL) 25 mg 24 hr tablet TAKE 1 TABLET (25 MG TOTAL) BY MOUTH DAILY.      midodrine (PROAMATINE) 2.5 mg tablet Take 2.5 mg by mouth 2 (two) times a day      norethindrone (MICRONOR) 0.35 MG tablet daily      ondansetron (ZOFRAN) 4 mg tablet 1 tab q8 hours prn nausea/ vomiting 20 tablet 2    polyethylene glycol (MIRALAX) 17 g packet Take 17 g by mouth as needed       rimegepant sulfate (NURTEC) 75 mg TBDP Take 1 tab at migraine onset. Max 1 tab per 24 hours. 16 tablet 11    sucralfate (CARAFATE) 1 g tablet       Tirosint 50 MCG CAPS TAKE 50 MCG BY MOUTH DAILY.      benzonatate (TESSALON) 200 MG capsule Take 1 capsule (200 mg total) by mouth 3 (three) times a day as needed  for cough (Patient not taking: Reported on 5/4/2024) 20 capsule 0    Cholecalciferol (Vitamin D3) 1.25 MG (39038 UT) CAPS TAKE 1,250 MCG BY MOUTH EVERY 7 DAYS FOR 8 DOSES. 8 WEEKS. (Patient not taking: Reported on 1/15/2024)      Cholecalciferol 1.25 MG (88096 UT) capsule TAKE 1,250 MCG BY MOUTH EVERY 7 DAYS FOR 8 DOSES. 8 WEEKS. (Patient not taking: Reported on 1/15/2024)      Cholecalciferol 50 MCG (2000 UT) CAPS Take 2,000 Units by mouth daily (Patient not taking: Reported on 1/15/2024)      dexamethasone (DECADRON) 1 mg tablet 1 tab qam with food 1 day prior to botox, day of botox and 1 day after botox (Patient not taking: Reported on 1/15/2024) 10 tablet 2    divalproex sodium (DEPAKOTE) 250 mg EC tablet 2 tabs q12 h x 2 days, then 1 tab q12 h x 2 days, then stop. Repeat if needed. (Patient not taking: Reported on 1/15/2024) 12 tablet 1    famotidine (PEPCID) 40 MG tablet TAKE 1 TABLET (40 MG TOTAL) BY MOUTH TWICE A DAY AS NEEDED FOR HEARTBURN (Patient not taking: Reported on 6/10/2024)      ketorolac (TORADOL) 10 mg tablet Take 1 tablet (10 mg total) by mouth every 6 (six) hours as needed (migraine) Max 2-3 per week. (Patient not taking: Reported on 6/10/2024) 10 tablet 0    metoclopramide (REGLAN) 5 mg tablet Take 5 mg by mouth if needed (Patient not taking: Reported on 6/10/2024)      nortriptyline (PAMELOR) 10 mg capsule Take 10 mg by mouth daily at bedtime (Patient not taking: Reported on 5/4/2024)      OLANZapine (ZyPREXA) 5 mg tablet Take 1 tablet (5 mg total) by mouth daily at bedtime for 5 days (Patient not taking: Reported on 6/10/2024) 5 tablet 0    predniSONE 20 mg tablet 3 tablets once daily day 1 through 3 then 2 tablets daily day 4 through 6 then 1 tablet daily day 7 through 9. (Patient not taking: Reported on 6/10/2024) 18 tablet 0    Prucalopride Succinate 2 MG TABS Take 1 tablet by mouth daily (Patient not taking: Reported on 1/15/2024)       No current facility-administered medications for  this visit.        Allergies   Allergen Reactions    Amoxicillin Diarrhea     Severe GI upset    Cyclobenzaprine Tachycardia     Pt states ALL MUSCLE RELAXANTS cause tachycardia    Diazepam GI Intolerance, Abdominal Pain and Other (See Comments)     Other reaction(s): Dyspepsia    Fentanyl Tachycardia    Fremanezumab Hives    Medical Tape Other (See Comments)     ekg electrodes- blisters    Midazolam Other (See Comments)     Head thrashes after the drug alert     Prednisone Other (See Comments)     Uncontrolled bladder.     Laurie - Food Allergy Rash    Melatonin Palpitations     Heart race       Review of Systems   Constitutional:  Negative for appetite change, fatigue and fever.   HENT: Negative.  Negative for hearing loss, tinnitus, trouble swallowing and voice change.    Eyes: Negative.  Negative for photophobia, pain and visual disturbance.   Respiratory: Negative.  Negative for shortness of breath.    Cardiovascular: Negative.  Negative for palpitations.   Gastrointestinal: Negative.  Negative for nausea and vomiting.   Endocrine: Negative.  Negative for cold intolerance.   Genitourinary: Negative.  Negative for dysuria, frequency and urgency.   Musculoskeletal:  Negative for back pain, gait problem, myalgias, neck pain and neck stiffness.   Skin: Negative.  Negative for rash.   Allergic/Immunologic: Negative.    Neurological:  Positive for numbness and headaches. Negative for dizziness, tremors, seizures, syncope, facial asymmetry, speech difficulty, weakness and light-headedness.   Hematological: Negative.  Does not bruise/bleed easily.   Psychiatric/Behavioral: Negative.  Negative for confusion, hallucinations and sleep disturbance.      ROS reviewed.    Video Exam    There were no vitals filed for this visit.    Physical Exam   Neurological exam:  On neurologic exam, the patient is alert and oriented to time and place. Speech is fluent and articulate, and the patient follows commands appropriately.  Judgment and affect appear normal.  Extraocular muscles are intact without nystagmus. Face is symmetric. Hearing is intact bilaterally. Motor examination reveals adequate range of motion.      Visit Time  Total Visit Duration: 15 min

## 2024-09-09 ENCOUNTER — TELEPHONE (OUTPATIENT)
Dept: NEUROLOGY | Facility: CLINIC | Age: 29
End: 2024-09-09

## 2024-09-09 NOTE — TELEPHONE ENCOUNTER
Received via  [employer] request for prior auth for Vyepti.  Request scanned into [employer].

## 2024-09-09 NOTE — TELEPHONE ENCOUNTER
Patient receives home infusions. Not handled by navigator team.  Forms need to be signed are requesting signature by . Forms to be signed under media dated 9/9/2024.

## 2024-09-10 NOTE — TELEPHONE ENCOUNTER
Signed by Dr HILARIO. Faxed to fax #[phone] along with last office note per [facility] [employer] request.     Placed in scanning bin at [facility].

## 2024-09-15 ENCOUNTER — OFFICE VISIT (OUTPATIENT)
Dept: URGENT CARE | Facility: MEDICAL CENTER | Age: 29
End: 2024-09-15
Payer: COMMERCIAL

## 2024-09-15 VITALS
TEMPERATURE: 98.3 F | RESPIRATION RATE: 18 BRPM | SYSTOLIC BLOOD PRESSURE: 125 MMHG | DIASTOLIC BLOOD PRESSURE: 88 MMHG | HEART RATE: 66 BPM | OXYGEN SATURATION: 98 %

## 2024-09-15 DIAGNOSIS — H69.93 EUSTACHIAN TUBE DYSFUNCTION, BILATERAL: Primary | ICD-10-CM

## 2024-09-15 PROCEDURE — S9083 URGENT CARE CENTER GLOBAL: HCPCS | Performed by: PHYSICIAN ASSISTANT

## 2024-09-15 PROCEDURE — G0381 LEV 2 HOSP TYPE B ED VISIT: HCPCS | Performed by: PHYSICIAN ASSISTANT

## 2024-09-15 RX ORDER — PREDNISONE 10 MG/1
TABLET ORAL
Qty: 18 TABLET | Refills: 0 | Status: SHIPPED | OUTPATIENT
Start: 2024-09-15

## 2024-09-15 RX ORDER — LIOTHYRONINE SODIUM 10 UG/ML
INJECTION, SOLUTION INTRAVENOUS
COMMUNITY

## 2024-09-15 RX ORDER — PREGABALIN 75 MG/1
75 CAPSULE ORAL 2 TIMES DAILY
COMMUNITY
Start: 2024-06-17

## 2024-09-15 RX ORDER — HYDROCORTISONE 2.5 %
2 CREAM (GRAM) TOPICAL 2 TIMES DAILY
COMMUNITY
Start: 2024-04-04 | End: 2025-04-04

## 2024-09-15 RX ORDER — FEXOFENADINE HCL 60 MG/1
TABLET, FILM COATED ORAL
COMMUNITY

## 2024-09-15 RX ORDER — CLOBETASOL PROPIONATE 0.5 MG/G
CREAM TOPICAL
COMMUNITY
Start: 2024-07-11

## 2024-09-15 RX ORDER — IRON,CARBONYL/ASCORBIC ACID 100-250 MG
TABLET ORAL
COMMUNITY

## 2024-09-15 NOTE — PROGRESS NOTES
[facility]        NAME: Renetta Meza is a 28 y.o. female  : 1995    MRN: [id number]  DATE: September 15, 2024  TIME: 8:44 AM    Assessment and Plan   Eustachian tube dysfunction, bilateral [H69.93]  1. Eustachian tube dysfunction, bilateral  predniSONE 10 mg tablet            Patient Instructions       Follow up with PCP as needed.  States she has had prednisone before without difficulty.  [place] pot.  Increase fluids.    If tests have been performed at [facility], our office will contact you with results if changes need to be made to the care plan discussed with you at the visit.  You can review your full results on [facility].    Chief Complaint     Chief Complaint   Patient presents with    Nasal Congestion     Patient c/o nasal congestion, cough, runny nose, right ear pain and sore throat x 3 days. Patient reports home covid test have been negative.          History of Present Illness       With a history of seasonal allergies taking Claritin.    Sinusitis  This is a recurrent problem. The current episode started in the past 7 days. The problem has been gradually worsening since onset. There has been no fever. The pain is moderate. Associated symptoms include congestion, coughing (Nonproductive), headaches, a hoarse voice, sinus pressure, sneezing and a sore throat. Pertinent negatives include no chills, diaphoresis, ear pain, neck pain, shortness of breath or swollen glands. Past treatments include nothing. The treatment provided no relief.       Review of Systems   Review of Systems   Constitutional:  Negative for chills and diaphoresis.   HENT:  Positive for congestion, hoarse voice, sinus pressure, sneezing and sore throat. Negative for ear pain.    Respiratory:  Positive for cough (Nonproductive). Negative for shortness of breath.    Musculoskeletal:  Negative for neck pain.   Neurological:  Positive for headaches.   All other systems reviewed and are negative.        Current  Medications       Current Outpatient Medications:     clobetasol (TEMOVATE) 0.05 % cream, APPLY TWICE DAILY TO RASH ON BODY UP TO 2 WEEKS/MONTH AS NEEDED., Disp: , Rfl:     hydrocortisone 2.5 % cream, Apply 2 Applications topically 2 (two) times a day, Disp: , Rfl:     predniSONE 10 mg tablet, 4 x 3 days, 3 x 1, 2 x 1, 1 x 1, Disp: 18 tablet, Rfl: 0    pregabalin (LYRICA) 75 mg capsule, Take 75 mg by mouth 2 (two) times a day, Disp: , Rfl:     benzonatate (TESSALON) 200 MG capsule, Take 1 capsule (200 mg total) by mouth 3 (three) times a day as needed for cough (Patient not taking: Reported on 5/4/2024), Disp: 20 capsule, Rfl: 0    Cholecalciferol (Vitamin D3) 1.25 MG (38341 UT) CAPS, TAKE 1,250 MCG BY MOUTH EVERY 7 DAYS FOR 8 DOSES. 8 WEEKS. (Patient not taking: Reported on 1/15/2024), Disp: , Rfl:     Cholecalciferol 1.25 MG (50121 UT) capsule, TAKE 1,250 MCG BY MOUTH EVERY 7 DAYS FOR 8 DOSES. 8 WEEKS. (Patient not taking: Reported on 1/15/2024), Disp: , Rfl:     Cholecalciferol 50 MCG (2000 UT) CAPS, Take 2,000 Units by mouth daily (Patient not taking: Reported on 1/15/2024), Disp: , Rfl:     [facility] Senna 8.6 MG tablet, Take 1 tablet by mouth if needed, Disp: , Rfl:     Cymbalta 30 MG delayed release capsule, Take 30 mg by mouth daily, Disp: , Rfl:     dexamethasone (DECADRON) 1 mg tablet, 1 tab qam with food 1 day prior to botox, day of botox and 1 day after botox (Patient not taking: Reported on 1/15/2024), Disp: 10 tablet, Rfl: 2    divalproex sodium (DEPAKOTE) 250 mg EC tablet, 2 tabs q12 h x 2 days, then 1 tab q12 h x 2 days, then stop. Repeat if needed. (Patient not taking: Reported on 1/15/2024), Disp: 12 tablet, Rfl: 1    econazole nitrate 1 % cream, APPLY TWICE DAILY TO RASH FOR 2-4 WEEKS OR UNTIL CLEAR, Disp: , Rfl:     EPINEPHrine (EPIPEN) 0.3 mg/0.3 mL SOAJ, Inject 0.3 mL (0.3 mg total) into a muscle once for 1 dose, Disp: 0.6 mL, Rfl: 0    esomeprazole (NexIUM) 40 MG capsule, , Disp: , Rfl:      famotidine (PEPCID) 40 MG tablet, TAKE 1 TABLET (40 MG TOTAL) BY MOUTH TWICE A DAY AS NEEDED FOR HEARTBURN (Patient not taking: Reported on 6/10/2024), Disp: , Rfl:     ferrous sulfate 325 (65 Fe) mg tablet, Take 1 tablet by mouth daily with breakfast, Disp: , Rfl:     fexofenadine (ALLEGRA) 60 MG tablet, every morning., Disp: , Rfl:     fludrocortisone (FLORINEF) 0.1 mg tablet, Take 0.1 mg by mouth daily, Disp: , Rfl:     gabapentin (NEURONTIN) 100 mg capsule, 200 mg qam and 300 mg qhs, Disp: 150 capsule, Rfl: 3    Iron-Vitamin C 100-250 MG TABS, , Disp: , Rfl:     ketoconazole (NIZORAL) 2 % shampoo, PLEASE SEE ATTACHED FOR DETAILED DIRECTIONS, Disp: , Rfl:     ketorolac (TORADOL) 10 mg tablet, Take 1 tablet (10 mg total) by mouth every 6 (six) hours as needed (migraine) Max 2-3 per week. (Patient not taking: Reported on 6/10/2024), Disp: 10 tablet, Rfl: 0    levothyroxine 50 mcg tablet, Take 50 mcg by mouth daily, Disp: , Rfl:     Liothyronine Sodium 10 MCG/ML SOLN, , Disp: , Rfl:     medroxyPROGESTERone (PROVERA) 10 mg tablet, Take 20 mg by mouth daily, Disp: , Rfl:     metoclopramide (REGLAN) 5 mg tablet, Take 5 mg by mouth if needed (Patient not taking: Reported on 6/10/2024), Disp: , Rfl:     metoprolol succinate (TOPROL-XL) 25 mg 24 hr tablet, Take 0.5 tablets by mouth daily, Disp: , Rfl:     metoprolol succinate (TOPROL-XL) 25 mg 24 hr tablet, TAKE 1 TABLET (25 MG TOTAL) BY MOUTH DAILY., Disp: , Rfl:     midodrine (PROAMATINE) 2.5 mg tablet, Take 2.5 mg by mouth 2 (two) times a day, Disp: , Rfl:     norethindrone (MICRONOR) 0.35 MG tablet, daily, Disp: , Rfl:     nortriptyline (PAMELOR) 10 mg capsule, Take 10 mg by mouth daily at bedtime (Patient not taking: Reported on 5/4/2024), Disp: , Rfl:     OLANZapine (ZyPREXA) 5 mg tablet, Take 1 tablet (5 mg total) by mouth daily at bedtime for 5 days (Patient not taking: Reported on 6/10/2024), Disp: 5 tablet, Rfl: 0    ondansetron (ZOFRAN) 4 mg tablet, 1 tab q8  hours prn nausea/ vomiting, Disp: 20 tablet, Rfl: 2    polyethylene glycol (MIRALAX) 17 g packet, Take 17 g by mouth as needed , Disp: , Rfl:     predniSONE 20 mg tablet, 3 tablets once daily day 1 through 3 then 2 tablets daily day 4 through 6 then 1 tablet daily day 7 through 9. (Patient not taking: Reported on 6/10/2024), Disp: 18 tablet, Rfl: 0    Prucalopride Succinate 2 MG TABS, Take 1 tablet by mouth daily (Patient not taking: Reported on 1/15/2024), Disp: , Rfl:     rimegepant sulfate (NURTEC) 75 mg TBDP, Take 1 tab at migraine onset. Max 1 tab per 24 hours., Disp: 16 tablet, Rfl: 11    sucralfate (CARAFATE) 1 g tablet, , Disp: , Rfl:     Tirosint 50 MCG CAPS, TAKE 50 MCG BY MOUTH DAILY., Disp: , Rfl:     Current Allergies     Allergies as of 09/15/2024 - Reviewed 09/15/2024   Allergen Reaction Noted    Amoxicillin Diarrhea, Other (See Comments), and Nausea Only 07/10/2023    Diazepam Abdominal Pain, GI Intolerance, Other (See Comments), and Nausea Only 06/30/2014    Fentanyl Tachycardia and Other (See Comments) 11/11/2019    Fremanezumab Hives 02/27/2023    Medical tape Other (See Comments) 03/19/2020    Midazolam Other (See Comments) 01/07/2022    Morphine GI Intolerance 08/16/2024    Prednisone Other (See Comments) 08/31/2021    Cyclobenzaprine Tachycardia and Palpitations 06/30/2014    Ginger - food allergy Rash and Hives 06/30/2020    Melatonin Palpitations 01/12/2022    Other Itching and Rash 08/09/2024            The following portions of the patient's history were reviewed and updated as appropriate: allergies, current medications, past family history, past medical history, past social history, past surgical history and problem list.     Past Medical History:   Diagnosis Date    Hashimoto's disease 11/09/2022    IBS (irritable bowel syndrome)     Kidney stones        Past Surgical History:   Procedure Laterality Date    CHOLECYSTECTOMY      HIP SURGERY  01/2022    [facility]-Dr. Villagomez        History reviewed. No pertinent family history.      Medications have been verified.        Objective   /88   Pulse 66   Temp 98.3 °F (36.8 °C)   Resp 18   SpO2 98%   No LMP recorded.       Physical Exam     Physical Exam  Vitals reviewed.   Constitutional:       Appearance: Normal appearance. She is normal weight.   HENT:      Right Ear: Ear canal and external ear normal.      Left Ear: Ear canal and external ear normal.      Nose: Congestion and rhinorrhea present.      Mouth/Throat:      Mouth: Mucous membranes are moist.      Pharynx: Posterior oropharyngeal erythema present. No oropharyngeal exudate.   Eyes:      Conjunctiva/sclera: Conjunctivae normal.   Cardiovascular:      Rate and Rhythm: Normal rate and regular rhythm.      Pulses: Normal pulses.      Heart sounds: Normal heart sounds.   Pulmonary:      Effort: Pulmonary effort is normal.      Breath sounds: Normal breath sounds.   Lymphadenopathy:      Cervical: No cervical adenopathy.   Neurological:      Mental Status: She is alert.   Psychiatric:         Mood and Affect: Mood normal.         Behavior: Behavior normal.       TMs bulging.  Positive maxillary tenderness left greater than right.  Nasal mucosa boggy and 80% closed on the left and 40% on the right.  Good transillumination maxillary sinuses.

## 2024-10-30 ENCOUNTER — APPOINTMENT (OUTPATIENT)
Dept: RADIOLOGY | Facility: CLINIC | Age: 29
End: 2024-10-30

## 2024-10-30 ENCOUNTER — APPOINTMENT (OUTPATIENT)
Dept: URGENT CARE | Facility: CLINIC | Age: 29
End: 2024-10-30

## 2024-10-30 DIAGNOSIS — Z02.1 PRE-EMPLOYMENT EXAMINATION: Primary | ICD-10-CM

## 2024-10-30 DIAGNOSIS — Z02.1 PRE-EMPLOYMENT EXAMINATION: ICD-10-CM

## 2024-10-30 PROCEDURE — 71046 X-RAY EXAM CHEST 2 VIEWS: CPT

## 2024-12-16 ENCOUNTER — OFFICE VISIT (OUTPATIENT)
Dept: NEUROLOGY | Facility: CLINIC | Age: 29
End: 2024-12-16
Payer: COMMERCIAL

## 2024-12-16 VITALS
HEART RATE: 71 BPM | DIASTOLIC BLOOD PRESSURE: 75 MMHG | BODY MASS INDEX: 29.84 KG/M2 | WEIGHT: 148 LBS | SYSTOLIC BLOOD PRESSURE: 115 MMHG | HEIGHT: 59 IN

## 2024-12-16 DIAGNOSIS — G43.709 CHRONIC MIGRAINE WITHOUT AURA WITHOUT STATUS MIGRAINOSUS, NOT INTRACTABLE: Primary | ICD-10-CM

## 2024-12-16 DIAGNOSIS — G43.109 MIGRAINE WITH AURA AND WITHOUT STATUS MIGRAINOSUS, NOT INTRACTABLE: ICD-10-CM

## 2024-12-16 PROCEDURE — 99213 OFFICE O/P EST LOW 20 MIN: CPT | Performed by: PHYSICIAN ASSISTANT

## 2024-12-16 RX ORDER — KETOROLAC TROMETHAMINE 10 MG/1
10 TABLET, FILM COATED ORAL EVERY 6 HOURS PRN
Qty: 10 TABLET | Refills: 0 | Status: SHIPPED | OUTPATIENT
Start: 2024-12-16

## 2024-12-16 NOTE — PROGRESS NOTES
Name: Renetta Meza      : 1995      MRN: [id number]  Encounter Provider: Tere Glez PA-C  Encounter Date: 2024   Encounter department: NEUROLOGY [facility]  :  Assessment & Plan  Chronic migraine without aura without status migrainosus, not intractable    Orders:    Ubrogepant (UBRELVY) 100 MG tablet; 1 tab at migraine onset, repeat in 2 hours if needed. Max 2 per day.    ketorolac (TORADOL) 10 mg tablet; Take 1 tablet (10 mg total) by mouth every 6 (six) hours as needed (migraine) Max 2-3 per week.    Migraine with aura and without status migrainosus, not intractable    Orders:    Ubrogepant (UBRELVY) 100 MG tablet; 1 tab at migraine onset, repeat in 2 hours if needed. Max 2 per day.    ketorolac (TORADOL) 10 mg tablet; Take 1 tablet (10 mg total) by mouth every 6 (six) hours as needed (migraine) Max 2-3 per week.      Continue Vyepti infusion every 84 days approximately.  She is satisfied with this prevention method for migraines, it is working well and she denies significant side effects.  She is having difficulty getting Nurtec approved by her insurance.  She is agreeable to try Ubrelvy.  Side effects reviewed.  If that fails or is denied she can use Toradol.    The patient should not hesitate to call me prior to her follow up with any questions or concerns.      There are no Patient Instructions on file for this visit.     History of Present Illness   HPI     Ms. Renetta Meza is here for neurological follow-up for migraines.    She does have adequate relief with migraines with Vyepti, notices more migraine headaches towards the end of Vyepti infusion/when it is due.  She does get some congestion during the Vyepti infusion but it does not last long.  No other side effects.  She goes to an outpatient infusion center for the infusion.  She gets the medications mailed to her house and brings them to the infusion center in [place].  She is happy with the treatment and it is  working for reduction of migraine headaches.  She tried Nurtec in the past and it worked but she is having difficulty getting it covered and would like another abortive option.  She would like to avoid NSAIDs due to history of gastroparesis.    No new neurologic deficits or characteristics associated with the headaches.      Review of Systems   Constitutional:  Negative for appetite change, fatigue and fever.   HENT: Negative.  Negative for hearing loss, tinnitus, trouble swallowing and voice change.    Eyes: Negative.  Negative for photophobia, pain and visual disturbance.   Respiratory: Negative.  Negative for shortness of breath.    Cardiovascular: Negative.  Negative for palpitations.   Gastrointestinal: Negative.  Negative for nausea and vomiting.   Endocrine: Negative.  Negative for cold intolerance.   Genitourinary: Negative.  Negative for dysuria, frequency and urgency.   Musculoskeletal:  Negative for back pain, gait problem, myalgias, neck pain and neck stiffness.   Skin: Negative.  Negative for rash.   Allergic/Immunologic: Negative.    Neurological:  Positive for headaches. Negative for dizziness, tremors, seizures, syncope, facial asymmetry, speech difficulty, weakness, light-headedness and numbness.   Hematological: Negative.  Does not bruise/bleed easily.   Psychiatric/Behavioral: Negative.  Negative for confusion, hallucinations and sleep disturbance.     I have personally reviewed the MA's review of systems and made changes as necessary.    Pertinent Medical History         Medical History Reviewed by provider this encounter:  Tobacco  Allergies  Meds  Problems  Med Hx  Surg Hx  Fam Hx     .  Past Medical History   Past Medical History:   Diagnosis Date    Hashimoto's disease 11/09/2022    IBS (irritable bowel syndrome)     Kidney stones      Past Surgical History:   Procedure Laterality Date    CHOLECYSTECTOMY      HIP SURGERY  01/2022    [facility]-Dr. Villagomez     History  reviewed. No pertinent family history.   reports that she has never smoked. She has never used smokeless tobacco. She reports current alcohol use. She reports that she does not use drugs.  Current Outpatient Medications on File Prior to Visit   Medication Sig Dispense Refill    clobetasol (TEMOVATE) 0.05 % cream APPLY TWICE DAILY TO RASH ON BODY UP TO 2 WEEKS/MONTH AS NEEDED.      [facility] Senna 8.6 MG tablet Take 1 tablet by mouth if needed      econazole nitrate 1 % cream APPLY TWICE DAILY TO RASH FOR 2-4 WEEKS OR UNTIL CLEAR      EPINEPHrine (EPIPEN) 0.3 mg/0.3 mL SOAJ Inject 0.3 mL (0.3 mg total) into a muscle once for 1 dose 0.6 mL 0    esomeprazole (NexIUM) 40 MG capsule       famotidine (PEPCID) 40 MG tablet       ferrous sulfate 325 (65 Fe) mg tablet Take 1 tablet by mouth daily with breakfast      fexofenadine (ALLEGRA) 60 MG tablet every morning.      fludrocortisone (FLORINEF) 0.1 mg tablet Take 0.1 mg by mouth daily      gabapentin (NEURONTIN) 100 mg capsule 200 mg qam and 300 mg qhs 150 capsule 3    hydrocortisone 2.5 % cream Apply 2 Applications topically 2 (two) times a day      Iron-Vitamin C 100-250 MG TABS       ketoconazole (NIZORAL) 2 % shampoo PLEASE SEE ATTACHED FOR DETAILED DIRECTIONS      metoclopramide (REGLAN) 5 mg tablet Take 5 mg by mouth if needed      metoprolol succinate (TOPROL-XL) 25 mg 24 hr tablet Take 0.5 tablets by mouth daily      metoprolol succinate (TOPROL-XL) 25 mg 24 hr tablet TAKE 1 TABLET (25 MG TOTAL) BY MOUTH DAILY.      midodrine (PROAMATINE) 2.5 mg tablet Take 2.5 mg by mouth 2 (two) times a day      norethindrone (MICRONOR) 0.35 MG tablet daily      ondansetron (ZOFRAN) 4 mg tablet 1 tab q8 hours prn nausea/ vomiting 20 tablet 2    polyethylene glycol (MIRALAX) 17 g packet Take 17 g by mouth as needed       predniSONE 10 mg tablet 4 x 3 days, 3 x 1, 2 x 1, 1 x 1 18 tablet 0    Prucalopride Succinate 2 MG TABS Take 1 tablet by mouth daily      sucralfate (CARAFATE) 1 g  tablet       Tirosint 50 MCG CAPS TAKE 50 MCG BY MOUTH DAILY.      [DISCONTINUED] ketorolac (TORADOL) 10 mg tablet Take 1 tablet (10 mg total) by mouth every 6 (six) hours as needed (migraine) Max 2-3 per week. 10 tablet 0    benzonatate (TESSALON) 200 MG capsule Take 1 capsule (200 mg total) by mouth 3 (three) times a day as needed for cough (Patient not taking: Reported on 12/16/2024) 20 capsule 0    Cholecalciferol (Vitamin D3) 1.25 MG (18564 UT) CAPS TAKE 1,250 MCG BY MOUTH EVERY 7 DAYS FOR 8 DOSES. 8 WEEKS. (Patient not taking: Reported on 12/16/2024)      Cholecalciferol 1.25 MG (61809 UT) capsule TAKE 1,250 MCG BY MOUTH EVERY 7 DAYS FOR 8 DOSES. 8 WEEKS. (Patient not taking: Reported on 12/16/2024)      Cholecalciferol 50 MCG (2000 UT) CAPS Take 2,000 Units by mouth daily (Patient not taking: Reported on 12/16/2024)      Cymbalta 30 MG delayed release capsule Take 30 mg by mouth daily (Patient not taking: Reported on 12/16/2024)      dexamethasone (DECADRON) 1 mg tablet 1 tab qam with food 1 day prior to botox, day of botox and 1 day after botox (Patient not taking: Reported on 12/16/2024) 10 tablet 2    divalproex sodium (DEPAKOTE) 250 mg EC tablet 2 tabs q12 h x 2 days, then 1 tab q12 h x 2 days, then stop. Repeat if needed. (Patient not taking: Reported on 12/16/2024) 12 tablet 1    levothyroxine 50 mcg tablet Take 50 mcg by mouth daily (Patient not taking: Reported on 12/16/2024)      Liothyronine Sodium 10 MCG/ML SOLN       medroxyPROGESTERone (PROVERA) 10 mg tablet Take 20 mg by mouth daily (Patient not taking: Reported on 12/16/2024)      nortriptyline (PAMELOR) 10 mg capsule Take 10 mg by mouth daily at bedtime (Patient not taking: Reported on 12/16/2024)      OLANZapine (ZyPREXA) 5 mg tablet Take 1 tablet (5 mg total) by mouth daily at bedtime for 5 days (Patient not taking: Reported on 12/16/2024) 5 tablet 0    predniSONE 20 mg tablet 3 tablets once daily day 1 through 3 then 2 tablets daily day 4  through 6 then 1 tablet daily day 7 through 9. (Patient not taking: Reported on 12/16/2024) 18 tablet 0    pregabalin (LYRICA) 75 mg capsule Take 75 mg by mouth 2 (two) times a day (Patient not taking: Reported on 12/16/2024)      [DISCONTINUED] rimegepant sulfate (NURTEC) 75 mg TBDP Take 1 tab at migraine onset. Max 1 tab per 24 hours. 16 tablet 11     No current facility-administered medications on file prior to visit.     Allergies   Allergen Reactions    Amoxicillin Diarrhea, Other (See Comments) and Nausea Only     Severe GI upset    Diazepam Abdominal Pain, GI Intolerance, Other (See Comments) and Nausea Only     Other reaction(s): Dyspepsia    Other reaction(s): GI Intolerance Other reaction(s): Dyspepsia      Other reaction(s): Dyspepsia      nausea    Fentanyl Tachycardia and Other (See Comments)     EGD   Head thrashes awake    Fremanezumab Hives    Medical Tape Other (See Comments)     ekg electrodes- blisters    Midazolam Other (See Comments)     Head thrashes after the drug alert     Morphine GI Intolerance    Prednisone Other (See Comments)     Uncontrolled bladder.    Uncontrolled bladder.       Uncontrolled bladder.  Uncontrolled bladder.    Cyclobenzaprine Tachycardia and Palpitations     Pt states ALL MUSCLE RELAXANTS cause tachycardia    Ginger - Food Allergy Rash and Hives    Melatonin Palpitations     Heart race    Other Itching and Rash      Current Outpatient Medications on File Prior to Visit   Medication Sig Dispense Refill    clobetasol (TEMOVATE) 0.05 % cream APPLY TWICE DAILY TO RASH ON BODY UP TO 2 WEEKS/MONTH AS NEEDED.      [facility] Senna 8.6 MG tablet Take 1 tablet by mouth if needed      econazole nitrate 1 % cream APPLY TWICE DAILY TO RASH FOR 2-4 WEEKS OR UNTIL CLEAR      EPINEPHrine (EPIPEN) 0.3 mg/0.3 mL SOAJ Inject 0.3 mL (0.3 mg total) into a muscle once for 1 dose 0.6 mL 0    esomeprazole (NexIUM) 40 MG capsule       famotidine (PEPCID) 40 MG tablet       ferrous sulfate 325 (65  Fe) mg tablet Take 1 tablet by mouth daily with breakfast      fexofenadine (ALLEGRA) 60 MG tablet every morning.      fludrocortisone (FLORINEF) 0.1 mg tablet Take 0.1 mg by mouth daily      gabapentin (NEURONTIN) 100 mg capsule 200 mg qam and 300 mg qhs 150 capsule 3    hydrocortisone 2.5 % cream Apply 2 Applications topically 2 (two) times a day      Iron-Vitamin C 100-250 MG TABS       ketoconazole (NIZORAL) 2 % shampoo PLEASE SEE ATTACHED FOR DETAILED DIRECTIONS      metoclopramide (REGLAN) 5 mg tablet Take 5 mg by mouth if needed      metoprolol succinate (TOPROL-XL) 25 mg 24 hr tablet Take 0.5 tablets by mouth daily      metoprolol succinate (TOPROL-XL) 25 mg 24 hr tablet TAKE 1 TABLET (25 MG TOTAL) BY MOUTH DAILY.      midodrine (PROAMATINE) 2.5 mg tablet Take 2.5 mg by mouth 2 (two) times a day      norethindrone (MICRONOR) 0.35 MG tablet daily      ondansetron (ZOFRAN) 4 mg tablet 1 tab q8 hours prn nausea/ vomiting 20 tablet 2    polyethylene glycol (MIRALAX) 17 g packet Take 17 g by mouth as needed       predniSONE 10 mg tablet 4 x 3 days, 3 x 1, 2 x 1, 1 x 1 18 tablet 0    Prucalopride Succinate 2 MG TABS Take 1 tablet by mouth daily      sucralfate (CARAFATE) 1 g tablet       Tirosint 50 MCG CAPS TAKE 50 MCG BY MOUTH DAILY.      [DISCONTINUED] ketorolac (TORADOL) 10 mg tablet Take 1 tablet (10 mg total) by mouth every 6 (six) hours as needed (migraine) Max 2-3 per week. 10 tablet 0    benzonatate (TESSALON) 200 MG capsule Take 1 capsule (200 mg total) by mouth 3 (three) times a day as needed for cough (Patient not taking: Reported on 12/16/2024) 20 capsule 0    Cholecalciferol (Vitamin D3) 1.25 MG (56273 UT) CAPS TAKE 1,250 MCG BY MOUTH EVERY 7 DAYS FOR 8 DOSES. 8 WEEKS. (Patient not taking: Reported on 12/16/2024)      Cholecalciferol 1.25 MG (97642 UT) capsule TAKE 1,250 MCG BY MOUTH EVERY 7 DAYS FOR 8 DOSES. 8 WEEKS. (Patient not taking: Reported on 12/16/2024)      Cholecalciferol 50 MCG (2000 UT)  CAPS Take 2,000 Units by mouth daily (Patient not taking: Reported on 12/16/2024)      Cymbalta 30 MG delayed release capsule Take 30 mg by mouth daily (Patient not taking: Reported on 12/16/2024)      dexamethasone (DECADRON) 1 mg tablet 1 tab qam with food 1 day prior to botox, day of botox and 1 day after botox (Patient not taking: Reported on 12/16/2024) 10 tablet 2    divalproex sodium (DEPAKOTE) 250 mg EC tablet 2 tabs q12 h x 2 days, then 1 tab q12 h x 2 days, then stop. Repeat if needed. (Patient not taking: Reported on 12/16/2024) 12 tablet 1    levothyroxine 50 mcg tablet Take 50 mcg by mouth daily (Patient not taking: Reported on 12/16/2024)      Liothyronine Sodium 10 MCG/ML SOLN       medroxyPROGESTERone (PROVERA) 10 mg tablet Take 20 mg by mouth daily (Patient not taking: Reported on 12/16/2024)      nortriptyline (PAMELOR) 10 mg capsule Take 10 mg by mouth daily at bedtime (Patient not taking: Reported on 12/16/2024)      OLANZapine (ZyPREXA) 5 mg tablet Take 1 tablet (5 mg total) by mouth daily at bedtime for 5 days (Patient not taking: Reported on 12/16/2024) 5 tablet 0    predniSONE 20 mg tablet 3 tablets once daily day 1 through 3 then 2 tablets daily day 4 through 6 then 1 tablet daily day 7 through 9. (Patient not taking: Reported on 12/16/2024) 18 tablet 0    pregabalin (LYRICA) 75 mg capsule Take 75 mg by mouth 2 (two) times a day (Patient not taking: Reported on 12/16/2024)      [DISCONTINUED] rimegepant sulfate (NURTEC) 75 mg TBDP Take 1 tab at migraine onset. Max 1 tab per 24 hours. 16 tablet 11     No current facility-administered medications on file prior to visit.      Social History     Tobacco Use    Smoking status: Never    Smokeless tobacco: Never   Vaping Use    Vaping status: Never Used   Substance and Sexual Activity    Alcohol use: Yes     Comment: social    Drug use: Never    Sexual activity: Not on file        Objective   /75 (BP Location: Left arm, Patient Position:  "Sitting, Cuff Size: Standard)   Pulse 71   Ht 4' 11\" (1.499 m)   Wt 67.1 kg (148 lb)   BMI 29.89 kg/m²     Physical Exam  Neurological Exam  On neurologic exam, the patient is alert and oriented to time and place. Speech is fluent and articulate, and the patient follows commands appropriately. Judgment and affect appear normal. Pupils are equally round and reactive to light and extraocular muscles are intact without nystagmus. Face is symmetric, and tongue, uvula, and palate are midline. Hearing is intact. Motor examination reveals intact strength throughout. Reflexes 2+ and symmetric in all 4 extremities.  Normal gait is steady.      Radiology Results Review : No pertinent imaging studies reviewed.    Administrative Statements   I have spent a total time of 20 minutes in caring for this patient on the day of the visit/encounter including Risks and benefits of tx options, Instructions for management, Patient and family education, Importance of tx compliance, Impressions, Counseling / Coordination of care, Documenting in the medical record, Reviewing / ordering tests, medicine, procedures  , and Obtaining or reviewing history  . Topics discussed with the patient / family include symptom assessment and management, medication review, and medication adjustment.  "

## 2024-12-17 NOTE — ASSESSMENT & PLAN NOTE
Orders:    Ubrogepant (UBRELVY) 100 MG tablet; 1 tab at migraine onset, repeat in 2 hours if needed. Max 2 per day.    ketorolac (TORADOL) 10 mg tablet; Take 1 tablet (10 mg total) by mouth every 6 (six) hours as needed (migraine) Max 2-3 per week.

## 2024-12-18 NOTE — TELEPHONE ENCOUNTER
Recd osiel:    Hi, my name is Halie, and I'm calling back  So my work received the short term disability paperwork, however, they want to know if they can be re sent  They said they're missing a few pages when I got copied through  So if you could resend that and or if you could send it or upload it on the portal that way in case they still have an issue, I can kind of print it out on my end for them here at work  If you can give me a call back, that would be great  Thank you  Jonathan MELTON [phone]    I returned call to acknowledge vm  Patient is asking for FMLA and disability paperwork be refaxed and a copy be uploaded to her my chart if possible 
none

## 2025-02-05 ENCOUNTER — TELEPHONE (OUTPATIENT)
Dept: NEUROLOGY | Facility: CLINIC | Age: 30
End: 2025-02-05

## 2025-02-05 NOTE — TELEPHONE ENCOUNTER
Received and scanned Unsigned [facility] Home infusion Orders into pt media. Will give to Dr HILARIO when back in the office.

## 2025-02-11 NOTE — TELEPHONE ENCOUNTER
Faxed and scanned Signed [facility] Home infusion Orders POT into pt media

## 2025-03-06 NOTE — TELEPHONE ENCOUNTER
Pt. here for wound check of pacemaker replacement by Dr. Blue on 2/27/25 at [facility]. L clavicular area is well approximated with no erythema or drainage. Pt. denies any fever or chills. Temp 98.6     Received pt approval details below from pt plan [place] administrators  Approved   Botox 200 UNITS  Qty  1  Auth# [id number]  Valid:3/8/2023-3/7/2024  Visits: 4    Please use Perform

## 2025-04-15 ENCOUNTER — TELEPHONE (OUTPATIENT)
Age: 30
End: 2025-04-15

## 2025-04-15 NOTE — TELEPHONE ENCOUNTER
Karla from [facility] called to request patients LOV notes 12/16/24 Tere Glez PA-C to be faxed.  Advised will forward request.    Please assist,    Thank you    [facility] #[phone]  FAX #[phone]

## 2025-04-22 ENCOUNTER — TELEPHONE (OUTPATIENT)
Dept: NEUROLOGY | Facility: CLINIC | Age: 30
End: 2025-04-22

## 2025-05-18 ENCOUNTER — APPOINTMENT (OUTPATIENT)
Dept: RADIOLOGY | Facility: CLINIC | Age: 30
End: 2025-05-18
Attending: NURSE PRACTITIONER
Payer: COMMERCIAL

## 2025-05-18 ENCOUNTER — OFFICE VISIT (OUTPATIENT)
Dept: URGENT CARE | Facility: CLINIC | Age: 30
End: 2025-05-18
Payer: COMMERCIAL

## 2025-05-18 VITALS
RESPIRATION RATE: 18 BRPM | HEIGHT: 59 IN | TEMPERATURE: 98.5 F | BODY MASS INDEX: 30.06 KG/M2 | DIASTOLIC BLOOD PRESSURE: 74 MMHG | HEART RATE: 82 BPM | SYSTOLIC BLOOD PRESSURE: 125 MMHG | WEIGHT: 149.1 LBS | OXYGEN SATURATION: 98 %

## 2025-05-18 DIAGNOSIS — S19.9XXA INJURY OF NECK, INITIAL ENCOUNTER: Primary | ICD-10-CM

## 2025-05-18 PROCEDURE — 99213 OFFICE O/P EST LOW 20 MIN: CPT | Performed by: NURSE PRACTITIONER

## 2025-05-18 PROCEDURE — 72040 X-RAY EXAM NECK SPINE 2-3 VW: CPT

## 2025-05-18 RX ORDER — TIZANIDINE 2 MG/1
2 TABLET ORAL 3 TIMES DAILY PRN
Qty: 20 TABLET | Refills: 0 | Status: SHIPPED | OUTPATIENT
Start: 2025-05-18

## 2025-05-18 NOTE — PROGRESS NOTES
[facility]        NAME: Renetta Meza is a 29 y.o. female  : 1995    MRN: [id number]  DATE: May 18, 2025  TIME: 1:30 PM    Assessment and Plan   Injury of neck, initial encounter [S19.9XXA]  1. Injury of neck, initial encounter  XR spine cervical 2 or 3 vw injury    tiZANidine (ZANAFLEX) 2 mg tablet            Patient Instructions       No obvious fracture on x-rays, wet read  Radiology report pending  Muscle relaxant sent to pharmacy; may cause drowsiness   Follow up with PCP in 3-5 days.  Proceed to  ER if symptoms worsen.    If tests have been performed at [facility], our office will contact you with results if changes need to be made to the care plan discussed with you at the visit.  [facility] on [facility]    Chief Complaint     Chief Complaint   Patient presents with    Neck Pain     [facility]. Left side took the brunt. But now has bad neck pain. Taking tylenon and ibuprofen. Feels pinching in the neck. Heating pad helps. Laying on pillow is very painful.          History of Present Illness       HPI  Reports she was at a party over a week ago and stumbled on the stairs, falling forward. Hit the upper body on the door frame. Did not fall to the ground. Denies syncope. Started having pain on the neck that has persisted since. Took some NSAIDs OTC. Here to r/o fracture.     Review of Systems   Review of Systems   Constitutional:  Negative for fever.   Musculoskeletal:  Positive for neck pain. Negative for back pain.   Neurological:  Negative for syncope, light-headedness and headaches.         Current Medications     Current Medications[1]    Current Allergies     Allergies as of 2025 - Reviewed 2025   Allergen Reaction Noted    Amoxicillin Diarrhea, Other (See Comments), and Nausea Only 07/10/2023    Diazepam Abdominal Pain, GI Intolerance, Other (See Comments), and Nausea Only 2014    Fentanyl  "Tachycardia and Other (See Comments) 11/11/2019    Fremanezumab Hives 02/27/2023    Medical tape Other (See Comments) 03/19/2020    Midazolam Other (See Comments) 01/07/2022    Morphine GI Intolerance 08/16/2024    Cyclobenzaprine Tachycardia and Palpitations 06/30/2014    Laurie - food allergy Rash and Hives 06/30/2020    Melatonin Palpitations 01/12/2022    Other Itching and Rash 08/09/2024            The following portions of the patient's history were reviewed and updated as appropriate: allergies, current medications, past family history, past medical history, past social history, past surgical history and problem list.     Past Medical History:   Diagnosis Date    Hashimoto's disease 11/09/2022    IBS (irritable bowel syndrome)     Kidney stones        Past Surgical History:   Procedure Laterality Date    CHOLECYSTECTOMY      HIP SURGERY  01/2022    [facility]-Dr. Villagomez       No family history on file.      Medications have been verified.        Objective   /74 (Patient Position: Sitting, Cuff Size: Standard)   Pulse 82   Temp 98.5 °F (36.9 °C) (Tympanic)   Resp 18   Ht 4' 11\" (1.499 m)   Wt 67.6 kg (149 lb 1.6 oz)   SpO2 98%   BMI 30.11 kg/m²   No LMP recorded.       Physical Exam     Physical Exam  Constitutional:       General: She is not in acute distress.     Appearance: She is not ill-appearing.     Musculoskeletal:         General: Tenderness (with palpation of the posterior aspect of the cervical spine, at the base of the neck. Pain also at the end of full flexion and extension of the head along the neck and with lateral movement of the head toward the left) present. No swelling or deformity.     Skin:     Findings: No bruising or rash.                        [1]   Current Outpatient Medications:     clobetasol (TEMOVATE) 0.05 % cream, , Disp: , Rfl:     [facility] Senna 8.6 MG tablet, Take 1 tablet by mouth if needed, Disp: , Rfl:     econazole nitrate 1 % cream, , Disp: , Rfl: "     esomeprazole (NexIUM) 40 MG capsule, , Disp: , Rfl:     famotidine (PEPCID) 40 MG tablet, , Disp: , Rfl:     ferrous sulfate 325 (65 Fe) mg tablet, Take 1 tablet by mouth daily with breakfast, Disp: , Rfl:     fexofenadine (ALLEGRA) 60 MG tablet, , Disp: , Rfl:     gabapentin (NEURONTIN) 100 mg capsule, 200 mg qam and 300 mg qhs, Disp: 150 capsule, Rfl: 3    ketoconazole (NIZORAL) 2 % shampoo, , Disp: , Rfl:     ketorolac (TORADOL) 10 mg tablet, Take 1 tablet (10 mg total) by mouth every 6 (six) hours as needed (migraine) Max 2-3 per week., Disp: 10 tablet, Rfl: 0    metoclopramide (REGLAN) 5 mg tablet, Take 5 mg by mouth if needed, Disp: , Rfl:     metoprolol succinate (TOPROL-XL) 25 mg 24 hr tablet, , Disp: , Rfl:     ondansetron (ZOFRAN) 4 mg tablet, 1 tab q8 hours prn nausea/ vomiting, Disp: 20 tablet, Rfl: 2    polyethylene glycol (MIRALAX) 17 g packet, Take 17 g by mouth as needed, Disp: , Rfl:     predniSONE 10 mg tablet, 4 x 3 days, 3 x 1, 2 x 1, 1 x 1, Disp: 18 tablet, Rfl: 0    Prucalopride Succinate 2 MG TABS, Take 1 tablet by mouth in the morning., Disp: , Rfl:     Tirosint 50 MCG CAPS, , Disp: , Rfl:     tiZANidine (ZANAFLEX) 2 mg tablet, Take 1 tablet (2 mg total) by mouth 3 (three) times a day as needed for muscle spasms (may cause drowsiness), Disp: 20 tablet, Rfl: 0    benzonatate (TESSALON) 200 MG capsule, Take 1 capsule (200 mg total) by mouth 3 (three) times a day as needed for cough (Patient not taking: Reported on 12/16/2024), Disp: 20 capsule, Rfl: 0    Cholecalciferol (Vitamin D3) 1.25 MG (07814 UT) CAPS, TAKE 1,250 MCG BY MOUTH EVERY 7 DAYS FOR 8 DOSES. 8 WEEKS. (Patient not taking: Reported on 12/16/2024), Disp: , Rfl:     Cholecalciferol 1.25 MG (65706 UT) capsule, TAKE 1,250 MCG BY MOUTH EVERY 7 DAYS FOR 8 DOSES. 8 WEEKS. (Patient not taking: Reported on 12/16/2024), Disp: , Rfl:     Cholecalciferol 50 MCG (2000 UT) CAPS, Take 2,000 Units by mouth daily (Patient not taking: Reported  on 12/16/2024), Disp: , Rfl:     Cymbalta 30 MG delayed release capsule, Take 30 mg by mouth daily (Patient not taking: Reported on 12/16/2024), Disp: , Rfl:     dexamethasone (DECADRON) 1 mg tablet, 1 tab qam with food 1 day prior to botox, day of botox and 1 day after botox (Patient not taking: Reported on 12/16/2024), Disp: 10 tablet, Rfl: 2    divalproex sodium (DEPAKOTE) 250 mg EC tablet, 2 tabs q12 h x 2 days, then 1 tab q12 h x 2 days, then stop. Repeat if needed. (Patient not taking: Reported on 12/16/2024), Disp: 12 tablet, Rfl: 1    EPINEPHrine (EPIPEN) 0.3 mg/0.3 mL SOAJ, Inject 0.3 mL (0.3 mg total) into a muscle once for 1 dose, Disp: 0.6 mL, Rfl: 0    hydrocortisone 2.5 % cream, Apply 2 Applications topically 2 (two) times a day, Disp: , Rfl:     Iron-Vitamin C 100-250 MG TABS, , Disp: , Rfl:     levothyroxine 50 mcg tablet, Take 50 mcg by mouth daily (Patient not taking: Reported on 12/16/2024), Disp: , Rfl:     Liothyronine Sodium 10 MCG/ML SOLN, , Disp: , Rfl:     medroxyPROGESTERone (PROVERA) 10 mg tablet, Take 20 mg by mouth daily (Patient not taking: Reported on 12/16/2024), Disp: , Rfl:     metoprolol succinate (TOPROL-XL) 25 mg 24 hr tablet, Take 0.5 tablets by mouth daily (Patient not taking: Reported on 5/18/2025), Disp: , Rfl:     midodrine (PROAMATINE) 2.5 mg tablet, Take 2.5 mg by mouth 2 (two) times a day, Disp: , Rfl:     norethindrone (MICRONOR) 0.35 MG tablet, daily (Patient not taking: Reported on 5/18/2025), Disp: , Rfl:     nortriptyline (PAMELOR) 10 mg capsule, Take 10 mg by mouth daily at bedtime (Patient not taking: Reported on 12/16/2024), Disp: , Rfl:     OLANZapine (ZyPREXA) 5 mg tablet, Take 1 tablet (5 mg total) by mouth daily at bedtime for 5 days (Patient not taking: Reported on 12/16/2024), Disp: 5 tablet, Rfl: 0    predniSONE 20 mg tablet, 3 tablets once daily day 1 through 3 then 2 tablets daily day 4 through 6 then 1 tablet daily day 7 through 9. (Patient not taking:  Reported on 12/16/2024), Disp: 18 tablet, Rfl: 0    pregabalin (LYRICA) 75 mg capsule, Take 75 mg by mouth 2 (two) times a day (Patient not taking: Reported on 12/16/2024), Disp: , Rfl:     sucralfate (CARAFATE) 1 g tablet, , Disp: , Rfl:     Ubrogepant (UBRELVY) 100 MG tablet, 1 tab at migraine onset, repeat in 2 hours if needed. Max 2 per day., Disp: 10 tablet, Rfl: 5

## 2025-05-22 ENCOUNTER — OFFICE VISIT (OUTPATIENT)
Dept: NEUROLOGY | Facility: CLINIC | Age: 30
End: 2025-05-22
Payer: COMMERCIAL

## 2025-05-22 VITALS
WEIGHT: 147 LBS | HEART RATE: 73 BPM | BODY MASS INDEX: 29.64 KG/M2 | HEIGHT: 59 IN | DIASTOLIC BLOOD PRESSURE: 70 MMHG | SYSTOLIC BLOOD PRESSURE: 100 MMHG

## 2025-05-22 DIAGNOSIS — G43.709 CHRONIC MIGRAINE WITHOUT AURA WITHOUT STATUS MIGRAINOSUS, NOT INTRACTABLE: Primary | ICD-10-CM

## 2025-05-22 DIAGNOSIS — G43.109 MIGRAINE WITH AURA AND WITHOUT STATUS MIGRAINOSUS, NOT INTRACTABLE: ICD-10-CM

## 2025-05-22 PROCEDURE — 99213 OFFICE O/P EST LOW 20 MIN: CPT | Performed by: PHYSICIAN ASSISTANT

## 2025-05-22 RX ORDER — HYDROXYCHLOROQUINE SULFATE 200 MG/1
200 TABLET, FILM COATED ORAL DAILY
COMMUNITY
Start: 2025-01-22 | End: 2026-01-22

## 2025-05-22 RX ORDER — RIZATRIPTAN BENZOATE 5 MG/1
TABLET, ORALLY DISINTEGRATING ORAL
Qty: 10 TABLET | Refills: 0 | Status: SHIPPED | OUTPATIENT
Start: 2025-05-22

## 2025-05-22 NOTE — PROGRESS NOTES
Name: Renetta Meza      : 1995      MRN: [id number]  Encounter Provider: Tere Glez PA-C  Encounter Date: 2025   Encounter department: NEUROLOGY [facility]  :  Assessment & Plan  Chronic migraine without aura without status migrainosus, not intractable  Continue Vyepti as needed scheduled for prevention of migraines, approximately every 84 days.  She does steroid and Benadryl as a premedication, prior to Vyepti, to prevent allergic reaction.  Ubrelvy as needed, side effects reviewed.    Orders:    rizatriptan (MAXALT-MLT) 5 mg disintegrating tablet; 1 tab at migraine onset, repeat after 2 hours if needed; Max 2 per day and 3 per week.    Ubrogepant (UBRELVY) 100 MG tablet; 1 tab at migraine onset, repeat in 2 hours if needed. Max 2 per day.    Migraine with aura and without status migrainosus, not intractable    Orders:    Ubrogepant (UBRELVY) 100 MG tablet; 1 tab at migraine onset, repeat in 2 hours if needed. Max 2 per day.        There are no Patient Instructions on file for this visit.       The patient should not hesitate to call me prior to her follow up with any questions or concerns.      History of Present Illness   HPI migraine follow up, says she is getting migraines every once in awhile, insurance keeps denying any prn medications.  Having difficulty getting Nurtec through insurance.    Prior notes:  She does have adequate relief with migraines with Vyepti, notices more migraine headaches towards the end of Vyepti infusion/when it is due.  She does get some congestion during the Vyepti infusion but it does not last long.  No other side effects.  She goes to an outpatient infusion center for the infusion.  She gets the medications mailed to her house and brings them to the infusion center in [place].  She is happy with the treatment and it is working for reduction of migraine headaches.  She tried Nurtec in the past and it worked but she is having difficulty getting it  covered and would like another abortive option.  She would like to avoid NSAIDs due to history of gastroparesis.    No new neurologic deficits or characteristics associated with the headaches.     Review of Systems   Constitutional:  Negative for appetite change, fatigue and fever.   HENT: Negative.  Negative for hearing loss, tinnitus, trouble swallowing and voice change.    Eyes: Negative.  Negative for photophobia, pain and visual disturbance.   Respiratory: Negative.  Negative for shortness of breath.    Cardiovascular: Negative.  Negative for palpitations.   Gastrointestinal: Negative.  Negative for nausea and vomiting.   Endocrine: Negative.  Negative for cold intolerance.   Genitourinary: Negative.  Negative for dysuria, frequency and urgency.   Musculoskeletal:  Negative for back pain, gait problem, myalgias, neck pain and neck stiffness.   Skin: Negative.  Negative for rash.   Allergic/Immunologic: Negative.    Neurological:  Positive for headaches. Negative for dizziness, tremors, seizures, syncope, facial asymmetry, speech difficulty, weakness, light-headedness and numbness.   Hematological: Negative.  Does not bruise/bleed easily.   Psychiatric/Behavioral: Negative.  Negative for confusion, hallucinations and sleep disturbance.     I have personally reviewed the MA's review of systems and made changes as necessary.    Pertinent Medical History           Medical History Reviewed by provider this encounter:  Tobacco  Allergies  Meds  Problems  Med Hx  Surg Hx  Fam Hx     .  Past Medical History   Past Medical History[1]  Past Surgical History[2]  Family History[3]   reports that she has never smoked. She has never used smokeless tobacco. She reports current alcohol use. She reports that she does not use drugs.  Current Outpatient Medications   Medication Instructions    benzonatate (TESSALON) 200 mg, Oral, 3 times daily PRN    Cholecalciferol (Vitamin D3) 1.25 MG (70813 UT) CAPS TAKE 1,250 MCG BY  MOUTH EVERY 7 DAYS FOR 8 DOSES. 8 WEEKS.    Cholecalciferol 1.25 MG (13720 UT) capsule TAKE 1,250 MCG BY MOUTH EVERY 7 DAYS FOR 8 DOSES. 8 WEEKS.    Cholecalciferol 2,000 Units, Daily    clobetasol (TEMOVATE) 0.05 % cream     [facility] Senna 8.6 MG tablet 1 tablet, As needed    Cymbalta 30 mg, Daily    dexamethasone (DECADRON) 1 mg tablet 1 tab qam with food 1 day prior to botox, day of botox and 1 day after botox    divalproex sodium (DEPAKOTE) 250 mg EC tablet 2 tabs q12 h x 2 days, then 1 tab q12 h x 2 days, then stop. Repeat if needed.    econazole nitrate 1 % cream     EPINEPHrine (EPIPEN) 0.3 mg, Intramuscular, Once    esomeprazole (NexIUM) 40 MG capsule No dose, route, or frequency recorded.    famotidine (PEPCID) 40 MG tablet     ferrous sulfate 325 (65 Fe) mg tablet 1 tablet, Daily with breakfast    fexofenadine (ALLEGRA) 60 MG tablet     gabapentin (NEURONTIN) 100 mg capsule 200 mg qam and 300 mg qhs    hydrocortisone 2.5 % cream 2 Applications, 2 times daily    hydroxychloroquine (PLAQUENIL) 200 mg, Daily    Iron-Vitamin C 100-250 MG TABS     ketoconazole (NIZORAL) 2 % shampoo     ketorolac (TORADOL) 10 mg, Oral, Every 6 hours PRN, Max 2-3 per week.    levothyroxine 50 mcg, Daily    Liothyronine Sodium 10 MCG/ML SOLN     medroxyPROGESTERone (PROVERA) 20 mg, Daily    metoclopramide (REGLAN) 5 mg, As needed    metoprolol succinate (TOPROL-XL) 25 mg 24 hr tablet 0.5 tablets, Daily    metoprolol succinate (TOPROL-XL) 25 mg 24 hr tablet     midodrine (PROAMATINE) 2.5 mg, 2 times daily    norethindrone (MICRONOR) 0.35 MG tablet Daily    nortriptyline (PAMELOR) 10 mg, Daily at bedtime    OLANZapine (ZYPREXA) 5 mg, Oral, Daily at bedtime    ondansetron (ZOFRAN) 4 mg tablet 1 tab q8 hours prn nausea/ vomiting    polyethylene glycol (MIRALAX) 17 g, As needed    predniSONE 10 mg tablet 4 x 3 days, 3 x 1, 2 x 1, 1 x 1    predniSONE 20 mg tablet 3 tablets once daily day 1 through 3 then 2 tablets daily day 4 through 6 then  "1 tablet daily day 7 through 9.    pregabalin (LYRICA) 75 mg, 2 times daily    Prucalopride Succinate 2 MG TABS 1 tablet, Daily    rizatriptan (MAXALT-MLT) 5 mg disintegrating tablet 1 tab at migraine onset, repeat after 2 hours if needed; Max 2 per day and 3 per week.    sucralfate (CARAFATE) 1 g tablet No dose, route, or frequency recorded.    Tirosint 50 MCG CAPS     tiZANidine (ZANAFLEX) 2 mg, Oral, 3 times daily PRN    Ubrogepant (UBRELVY) 100 MG tablet 1 tab at migraine onset, repeat in 2 hours if needed. Max 2 per day.   Allergies[4]   Medications Ordered Prior to Encounter[5]   Social History[6]     Objective   /70 (BP Location: Left arm, Patient Position: Sitting, Cuff Size: Standard)   Pulse 73   Ht 4' 11\" (1.499 m)   Wt 66.7 kg (147 lb)   BMI 29.69 kg/m²     Physical Exam  Neurological Exam  On neurologic exam, the patient is alert and oriented to time and place. Speech is fluent and articulate, and the patient follows commands appropriately. Judgment and affect appear normal. Pupils are equally round and reactive to light and extraocular muscles are intact without nystagmus. Face is symmetric, and tongue, uvula, and palate are midline. Hearing is intact. Motor examination reveals intact strength throughout. Neck flexors 5/5.  Reflexes 2+ and symmetric in all 4 extremities. Normal gait is steady.      Administrative Statements   I have spent a total time of 20 minutes in caring for this patient on the day of the visit/encounter including Risks and benefits of tx options, Instructions for management, Patient and family education, Importance of tx compliance, Risk factor reductions, Impressions, Counseling / Coordination of care, Documenting in the medical record, Reviewing/placing orders in the medical record (including tests, medications, and/or procedures), and Obtaining or reviewing history  .         [1]   Past Medical History:  Diagnosis Date    Hashimoto's disease 11/09/2022    IBS " (irritable bowel syndrome)     Kidney stones    [2]   Past Surgical History:  Procedure Laterality Date    CHOLECYSTECTOMY      HIP SURGERY  01/2022    [facility]-Dr. Villagomez   [3] No family history on file.  [4]   Allergies  Allergen Reactions    Amoxicillin Diarrhea, Other (See Comments) and Nausea Only     Severe GI upset    Diazepam Abdominal Pain, GI Intolerance, Other (See Comments) and Nausea Only     Other reaction(s): Dyspepsia    Other reaction(s): GI Intolerance Other reaction(s): Dyspepsia      Other reaction(s): Dyspepsia      nausea    Fentanyl Tachycardia and Other (See Comments)     EGD   Head thrashes awake    Fremanezumab Hives    Medical Tape Other (See Comments)     ekg electrodes- blisters    Midazolam Other (See Comments)     Head thrashes after the drug alert     Morphine GI Intolerance    Cyclobenzaprine Tachycardia and Palpitations     Pt states ALL MUSCLE RELAXANTS cause tachycardia    Laurie - Food Allergy Rash and Hives    Melatonin Palpitations     Heart race    Other Itching and Rash   [5]   Current Outpatient Medications on File Prior to Visit   Medication Sig Dispense Refill    clobetasol (TEMOVATE) 0.05 % cream       [facility] Senna 8.6 MG tablet Take 1 tablet by mouth if needed      econazole nitrate 1 % cream       EPINEPHrine (EPIPEN) 0.3 mg/0.3 mL SOAJ Inject 0.3 mL (0.3 mg total) into a muscle once for 1 dose 0.6 mL 0    esomeprazole (NexIUM) 40 MG capsule       famotidine (PEPCID) 40 MG tablet       ferrous sulfate 325 (65 Fe) mg tablet Take 1 tablet by mouth daily with breakfast      fexofenadine (ALLEGRA) 60 MG tablet       gabapentin (NEURONTIN) 100 mg capsule 200 mg qam and 300 mg qhs 150 capsule 3    hydrocortisone 2.5 % cream Apply 2 Applications topically in the morning and 2 Applications in the evening.      hydroxychloroquine (PLAQUENIL) 200 mg tablet Take 200 mg by mouth daily      Iron-Vitamin C 100-250 MG TABS       ketorolac (TORADOL) 10 mg tablet Take 1  tablet (10 mg total) by mouth every 6 (six) hours as needed (migraine) Max 2-3 per week. 10 tablet 0    metoclopramide (REGLAN) 5 mg tablet Take 5 mg by mouth if needed      midodrine (PROAMATINE) 2.5 mg tablet Take 2.5 mg by mouth in the morning and 2.5 mg in the evening.      ondansetron (ZOFRAN) 4 mg tablet 1 tab q8 hours prn nausea/ vomiting 20 tablet 2    polyethylene glycol (MIRALAX) 17 g packet Take 17 g by mouth as needed      predniSONE 10 mg tablet 4 x 3 days, 3 x 1, 2 x 1, 1 x 1 18 tablet 0    sucralfate (CARAFATE) 1 g tablet  (Patient taking differently: Take 1 g by mouth as needed)      tiZANidine (ZANAFLEX) 2 mg tablet Take 1 tablet (2 mg total) by mouth 3 (three) times a day as needed for muscle spasms (may cause drowsiness) 20 tablet 0    benzonatate (TESSALON) 200 MG capsule Take 1 capsule (200 mg total) by mouth 3 (three) times a day as needed for cough (Patient not taking: Reported on 5/4/2024) 20 capsule 0    Cholecalciferol (Vitamin D3) 1.25 MG (23263 UT) CAPS TAKE 1,250 MCG BY MOUTH EVERY 7 DAYS FOR 8 DOSES. 8 WEEKS. (Patient not taking: Reported on 1/15/2024)      Cholecalciferol 1.25 MG (83826 UT) capsule TAKE 1,250 MCG BY MOUTH EVERY 7 DAYS FOR 8 DOSES. 8 WEEKS. (Patient not taking: Reported on 1/15/2024)      Cholecalciferol 50 MCG (2000 UT) CAPS Take 2,000 Units by mouth daily (Patient not taking: Reported on 1/15/2024)      Cymbalta 30 MG delayed release capsule Take 30 mg by mouth daily (Patient not taking: Reported on 12/16/2024)      dexamethasone (DECADRON) 1 mg tablet 1 tab qam with food 1 day prior to botox, day of botox and 1 day after botox (Patient not taking: Reported on 1/15/2024) 10 tablet 2    divalproex sodium (DEPAKOTE) 250 mg EC tablet 2 tabs q12 h x 2 days, then 1 tab q12 h x 2 days, then stop. Repeat if needed. (Patient not taking: Reported on 1/15/2024) 12 tablet 1    ketoconazole (NIZORAL) 2 % shampoo  (Patient not taking: Reported on 5/22/2025)      levothyroxine 50  mcg tablet Take 50 mcg by mouth daily (Patient not taking: Reported on 12/16/2024)      Liothyronine Sodium 10 MCG/ML SOLN  (Patient not taking: Reported on 5/22/2025)      medroxyPROGESTERone (PROVERA) 10 mg tablet Take 20 mg by mouth daily (Patient not taking: Reported on 5/22/2025)      metoprolol succinate (TOPROL-XL) 25 mg 24 hr tablet Take 0.5 tablets by mouth daily (Patient not taking: Reported on 5/22/2025)      metoprolol succinate (TOPROL-XL) 25 mg 24 hr tablet  (Patient not taking: Reported on 5/22/2025)      norethindrone (MICRONOR) 0.35 MG tablet daily (Patient not taking: Reported on 5/22/2025)      nortriptyline (PAMELOR) 10 mg capsule Take 10 mg by mouth daily at bedtime (Patient not taking: Reported on 5/4/2024)      OLANZapine (ZyPREXA) 5 mg tablet Take 1 tablet (5 mg total) by mouth daily at bedtime for 5 days (Patient not taking: Reported on 6/10/2024) 5 tablet 0    predniSONE 20 mg tablet 3 tablets once daily day 1 through 3 then 2 tablets daily day 4 through 6 then 1 tablet daily day 7 through 9. (Patient not taking: Reported on 6/10/2024) 18 tablet 0    pregabalin (LYRICA) 75 mg capsule Take 75 mg by mouth 2 (two) times a day (Patient not taking: Reported on 12/16/2024)      Prucalopride Succinate 2 MG TABS Take 1 tablet by mouth in the morning.      Tirosint 50 MCG CAPS        No current facility-administered medications on file prior to visit.   [6]   Social History  Tobacco Use    Smoking status: Never    Smokeless tobacco: Never   Vaping Use    Vaping status: Never Used   Substance and Sexual Activity    Alcohol use: Yes     Comment: social    Drug use: Never

## 2025-05-22 NOTE — ASSESSMENT & PLAN NOTE
Orders:    Ubrogepant (UBRELVY) 100 MG tablet; 1 tab at migraine onset, repeat in 2 hours if needed. Max 2 per day.

## 2025-05-22 NOTE — ASSESSMENT & PLAN NOTE
Continue Vyepti as needed scheduled for prevention of migraines, approximately every 84 days.  She does steroid and Benadryl as a premedication, prior to Vyepti, to prevent allergic reaction.  Ubrelvy as needed, side effects reviewed.    Orders:    rizatriptan (MAXALT-MLT) 5 mg disintegrating tablet; 1 tab at migraine onset, repeat after 2 hours if needed; Max 2 per day and 3 per week.    Ubrogepant (UBRELVY) 100 MG tablet; 1 tab at migraine onset, repeat in 2 hours if needed. Max 2 per day.

## 2025-06-20 DIAGNOSIS — G43.709 CHRONIC MIGRAINE WITHOUT AURA WITHOUT STATUS MIGRAINOSUS, NOT INTRACTABLE: ICD-10-CM

## 2025-06-23 RX ORDER — RIZATRIPTAN BENZOATE 5 MG/1
TABLET, ORALLY DISINTEGRATING ORAL
Qty: 10 TABLET | Refills: 5 | Status: SHIPPED | OUTPATIENT
Start: 2025-06-23

## 2025-06-27 ENCOUNTER — OFFICE VISIT (OUTPATIENT)
Dept: URGENT CARE | Facility: MEDICAL CENTER | Age: 30
End: 2025-06-27
Payer: COMMERCIAL

## 2025-06-27 ENCOUNTER — APPOINTMENT (OUTPATIENT)
Dept: RADIOLOGY | Facility: MEDICAL CENTER | Age: 30
End: 2025-06-27
Attending: NURSE PRACTITIONER
Payer: COMMERCIAL

## 2025-06-27 VITALS
DIASTOLIC BLOOD PRESSURE: 75 MMHG | RESPIRATION RATE: 16 BRPM | TEMPERATURE: 98.4 F | SYSTOLIC BLOOD PRESSURE: 124 MMHG | HEART RATE: 84 BPM

## 2025-06-27 DIAGNOSIS — S69.92XA INJURY OF LEFT WRIST, INITIAL ENCOUNTER: Primary | ICD-10-CM

## 2025-06-27 DIAGNOSIS — S59.902A INJURY OF LEFT ELBOW, INITIAL ENCOUNTER: ICD-10-CM

## 2025-06-27 DIAGNOSIS — S69.92XA INJURY OF LEFT WRIST, INITIAL ENCOUNTER: ICD-10-CM

## 2025-06-27 PROCEDURE — 73110 X-RAY EXAM OF WRIST: CPT

## 2025-06-27 PROCEDURE — 73080 X-RAY EXAM OF ELBOW: CPT

## 2025-06-27 PROCEDURE — 99214 OFFICE O/P EST MOD 30 MIN: CPT | Performed by: NURSE PRACTITIONER

## 2025-06-28 NOTE — PROGRESS NOTES
"  [facility]        NAME: Renetta Meza is a 29 y.o. female  : 1995    MRN: [id number]  DATE: 2025  TIME: 8:59 PM    Assessment and Plan   Injury of left wrist, initial encounter [S69.92XA]  1. Injury of left wrist, initial encounter  XR wrist 3+ vw left    Brace      2. Injury of left elbow, initial encounter  XR elbow 3+ vw left            Patient Instructions     No fracture seen on x-rays  Wrist brace placed on the wrist by RN  Motrin or tylenol OTC prn for pain   Follow up with PCP in 3-5 days.  Proceed to  ER if symptoms worsen.    If tests have been performed at [facility], our office will contact you with results if changes need to be made to the care plan discussed with you at the visit.  You can review your full results on [facility].    Chief Complaint     Chief Complaint   Patient presents with    Wrist Injury     Patient relates I was at home x30 minutes ago my 85 pound dog slammed into my left wrist, left hand and left forearm. C/O pain.  \"Hurts to move it' Ice pack provided.         History of Present Illness       HPI  Presents to clinic with complaint of pain to the left arm. States her dog jumped onto her left arm while she was laying in bed. This was less than 1 hour ago. She has been in pain since. Pain seems to be getting worse. Has not used any medication for the pain. Pain is located from the left elbow down into the wrist.  Initially was having some numbness and tingling on the fingers but that has improved.    Review of Systems   Review of Systems   Musculoskeletal:  Positive for arthralgias (left wrist and left elbow) and joint swelling (left wrist).   Neurological:  Negative for weakness and numbness.         Current Medications     Current Medications[1]    Current Allergies     Allergies as of 2025 - Reviewed 2025   Allergen Reaction Noted    Amoxicillin Diarrhea, Other (See Comments), and Nausea Only 07/10/2023    Diazepam Abdominal Pain, GI " Intolerance, Other (See Comments), and Nausea Only 06/30/2014    Fentanyl Tachycardia and Other (See Comments) 11/11/2019    Fremanezumab Hives 02/27/2023    Medical tape Other (See Comments) 03/19/2020    Midazolam Other (See Comments) 01/07/2022    Morphine GI Intolerance 08/16/2024    Cyclobenzaprine Tachycardia and Palpitations 06/30/2014    Laurie - food allergy Rash and Hives 06/30/2020    Melatonin Palpitations 01/12/2022    Other Itching and Rash 08/09/2024            The following portions of the patient's history were reviewed and updated as appropriate: allergies, current medications, past family history, past medical history, past social history, past surgical history and problem list.     Past Medical History[2]    Past Surgical History[3]    Family History[4]      Medications have been verified.        Objective   /75 (Patient Position: Sitting)   Pulse 84   Temp 98.4 °F (36.9 °C) (Temporal)   Resp 16   LMP  (LMP Unknown) Comment: uterine ablation 9/2024  No LMP recorded (lmp unknown).       Physical Exam     Physical Exam    Musculoskeletal:         General: Swelling (left wrist, dorsal aspect, mild) and tenderness (with palpation of the first metacarpal, left wrist, left forearm and left elbow. Dorsiflexion of the left wrist also causes pain) present. No deformity.      Comments: Full ROM of the left elbow with passive ROM.   Slightly limited passive ROM of the left wrist with dorsiflexion, d/t pain   strength slightly decreased d/t pain on the wrist, with hard grasping     Skin:     Capillary Refill: Capillary refill takes less than 2 seconds.      Findings: No bruising.                        [1]   Current Outpatient Medications:     [facility] Senna 8.6 MG tablet, Take 1 tablet by mouth if needed, Disp: , Rfl:     econazole nitrate 1 % cream, , Disp: , Rfl:     esomeprazole (NexIUM) 40 MG capsule, , Disp: , Rfl:     famotidine (PEPCID) 40 MG tablet, , Disp: , Rfl:     ferrous sulfate 325  (65 Fe) mg tablet, Take 1 tablet by mouth daily with breakfast, Disp: , Rfl:     fexofenadine (ALLEGRA) 60 MG tablet, , Disp: , Rfl:     gabapentin (NEURONTIN) 100 mg capsule, 200 mg qam and 300 mg qhs, Disp: 150 capsule, Rfl: 3    hydroxychloroquine (PLAQUENIL) 200 mg tablet, Take 200 mg by mouth daily, Disp: , Rfl:     Iron-Vitamin C 100-250 MG TABS, , Disp: , Rfl:     ketorolac (TORADOL) 10 mg tablet, Take 1 tablet (10 mg total) by mouth every 6 (six) hours as needed (migraine) Max 2-3 per week., Disp: 10 tablet, Rfl: 0    levothyroxine 50 mcg tablet, Take 50 mcg by mouth daily, Disp: , Rfl:     metoclopramide (REGLAN) 5 mg tablet, Take 5 mg by mouth if needed, Disp: , Rfl:     metoprolol succinate (TOPROL-XL) 25 mg 24 hr tablet, Take 0.5 tablets by mouth daily, Disp: , Rfl:     norethindrone (MICRONOR) 0.35 MG tablet, daily, Disp: , Rfl:     ondansetron (ZOFRAN) 4 mg tablet, 1 tab q8 hours prn nausea/ vomiting, Disp: 20 tablet, Rfl: 2    polyethylene glycol (MIRALAX) 17 g packet, Take 17 g by mouth as needed, Disp: , Rfl:     Prucalopride Succinate 2 MG TABS, Take 1 tablet by mouth in the morning., Disp: , Rfl:     rizatriptan (MAXALT-MLT) 5 mg disintegrating tablet, 1 TAB AT MIGRAINE ONSET, REPEAT AFTER 2 HOURS IF NEEDED MAX 2 PER DAY AND 3 PER WEEK., Disp: 10 tablet, Rfl: 5    sucralfate (CARAFATE) 1 g tablet, , Disp: , Rfl:     tiZANidine (ZANAFLEX) 2 mg tablet, Take 1 tablet (2 mg total) by mouth 3 (three) times a day as needed for muscle spasms (may cause drowsiness), Disp: 20 tablet, Rfl: 0    Ubrogepant (UBRELVY) 100 MG tablet, 1 tab at migraine onset, repeat in 2 hours if needed. Max 2 per day., Disp: 10 tablet, Rfl: 5    benzonatate (TESSALON) 200 MG capsule, Take 1 capsule (200 mg total) by mouth 3 (three) times a day as needed for cough (Patient not taking: Reported on 5/4/2024), Disp: 20 capsule, Rfl: 0    Cholecalciferol (Vitamin D3) 1.25 MG (36037 UT) CAPS, TAKE 1,250 MCG BY MOUTH EVERY 7 DAYS FOR  8 DOSES. 8 WEEKS. (Patient not taking: Reported on 1/15/2024), Disp: , Rfl:     Cholecalciferol 1.25 MG (61507 UT) capsule, TAKE 1,250 MCG BY MOUTH EVERY 7 DAYS FOR 8 DOSES. 8 WEEKS. (Patient not taking: Reported on 1/15/2024), Disp: , Rfl:     Cholecalciferol 50 MCG (2000 UT) CAPS, Take 2,000 Units by mouth daily (Patient not taking: Reported on 1/15/2024), Disp: , Rfl:     clobetasol (TEMOVATE) 0.05 % cream, , Disp: , Rfl:     Cymbalta 30 MG delayed release capsule, Take 30 mg by mouth daily (Patient not taking: Reported on 12/16/2024), Disp: , Rfl:     dexamethasone (DECADRON) 1 mg tablet, 1 tab qam with food 1 day prior to botox, day of botox and 1 day after botox (Patient not taking: Reported on 1/15/2024), Disp: 10 tablet, Rfl: 2    divalproex sodium (DEPAKOTE) 250 mg EC tablet, 2 tabs q12 h x 2 days, then 1 tab q12 h x 2 days, then stop. Repeat if needed. (Patient not taking: Reported on 1/15/2024), Disp: 12 tablet, Rfl: 1    EPINEPHrine (EPIPEN) 0.3 mg/0.3 mL SOAJ, Inject 0.3 mL (0.3 mg total) into a muscle once for 1 dose, Disp: 0.6 mL, Rfl: 0    hydrocortisone 2.5 % cream, Apply 2 Applications topically in the morning and 2 Applications in the evening., Disp: , Rfl:     ketoconazole (NIZORAL) 2 % shampoo, , Disp: , Rfl:     Liothyronine Sodium 10 MCG/ML SOLN, , Disp: , Rfl:     medroxyPROGESTERone (PROVERA) 10 mg tablet, Take 20 mg by mouth daily (Patient not taking: Reported on 12/16/2024), Disp: , Rfl:     metoprolol succinate (TOPROL-XL) 25 mg 24 hr tablet, , Disp: , Rfl:     midodrine (PROAMATINE) 2.5 mg tablet, Take 2.5 mg by mouth in the morning and 2.5 mg in the evening., Disp: , Rfl:     nortriptyline (PAMELOR) 10 mg capsule, Take 10 mg by mouth daily at bedtime (Patient not taking: Reported on 6/27/2025), Disp: , Rfl:     OLANZapine (ZyPREXA) 5 mg tablet, Take 1 tablet (5 mg total) by mouth daily at bedtime for 5 days (Patient not taking: Reported on 6/10/2024), Disp: 5 tablet, Rfl: 0     predniSONE 10 mg tablet, 4 x 3 days, 3 x 1, 2 x 1, 1 x 1 (Patient not taking: Reported on 6/27/2025), Disp: 18 tablet, Rfl: 0    predniSONE 20 mg tablet, 3 tablets once daily day 1 through 3 then 2 tablets daily day 4 through 6 then 1 tablet daily day 7 through 9. (Patient not taking: Reported on 6/10/2024), Disp: 18 tablet, Rfl: 0    pregabalin (LYRICA) 75 mg capsule, Take 75 mg by mouth 2 (two) times a day (Patient not taking: Reported on 12/16/2024), Disp: , Rfl:     Tirosint 50 MCG CAPS, , Disp: , Rfl:   [2]   Past Medical History:  Diagnosis Date    Hashimoto's disease 11/09/2022    IBS (irritable bowel syndrome)     Kidney stones    [3]   Past Surgical History:  Procedure Laterality Date    CHOLECYSTECTOMY      HIP SURGERY  01/2022    [facility]-Dr. Villagomez   [4] No family history on file.

## 2025-07-10 ENCOUNTER — TELEPHONE (OUTPATIENT)
Dept: NEUROLOGY | Facility: CLINIC | Age: 30
End: 2025-07-10

## 2025-07-10 DIAGNOSIS — G43.709 CHRONIC MIGRAINE WITHOUT AURA WITHOUT STATUS MIGRAINOSUS, NOT INTRACTABLE: Primary | ICD-10-CM

## 2025-07-10 NOTE — TELEPHONE ENCOUNTER
Pt called, wants to try nurtec again. This was the only med that worked at migraine onset.  Ubrelvy ineffective.

## 2025-07-14 NOTE — TELEPHONE ENCOUNTER
PA for Nurtec 75mg TBDP SUBMITTED to [facility]    via    []CMM-KEY:   [x]Surescripts-Case ID # [id number]   []Availity-Auth ID # NDC #   []Faxed to plan   []Other website   []Phone call Case ID #     [x]PA sent as URGENT    All office notes, labs and other pertaining documents and studies sent. Clinical questions answered. Awaiting determination from insurance company.     Turnaround time for your insurance to make a decision on your Prior Authorization can take 7-21 business days.